# Patient Record
Sex: FEMALE | Race: WHITE | Employment: FULL TIME | URBAN - METROPOLITAN AREA
[De-identification: names, ages, dates, MRNs, and addresses within clinical notes are randomized per-mention and may not be internally consistent; named-entity substitution may affect disease eponyms.]

---

## 2017-03-23 PROBLEM — D17.21 LIPOMA OF RIGHT FOREARM: Status: ACTIVE | Noted: 2017-03-23

## 2018-12-28 ENCOUNTER — APPOINTMENT (OUTPATIENT)
Dept: CT IMAGING | Age: 48
End: 2018-12-28
Payer: COMMERCIAL

## 2018-12-28 ENCOUNTER — HOSPITAL ENCOUNTER (EMERGENCY)
Age: 48
Discharge: HOME | End: 2018-12-28
Payer: COMMERCIAL

## 2018-12-28 VITALS
OXYGEN SATURATION: 96 % | BODY MASS INDEX: 34.96 KG/M2 | TEMPERATURE: 98.2 F | DIASTOLIC BLOOD PRESSURE: 84 MMHG | HEIGHT: 62 IN | WEIGHT: 190 LBS | SYSTOLIC BLOOD PRESSURE: 127 MMHG | RESPIRATION RATE: 16 BRPM | HEART RATE: 98 BPM

## 2018-12-28 DIAGNOSIS — R10.9 RIGHT FLANK PAIN: Primary | ICD-10-CM

## 2018-12-28 DIAGNOSIS — R35.0 URINARY FREQUENCY: ICD-10-CM

## 2018-12-28 LAB
ALBUMIN SERPL-MCNC: 3.8 GM/DL (ref 3.4–5)
ALP BLD-CCNC: 61 IU/L (ref 40–129)
ALT SERPL-CCNC: 11 U/L (ref 10–40)
ANION GAP SERPL CALCULATED.3IONS-SCNC: 13 MMOL/L (ref 4–16)
AST SERPL-CCNC: 13 IU/L (ref 15–37)
BACTERIA: ABNORMAL /HPF
BASOPHILS ABSOLUTE: 0 K/CU MM
BASOPHILS RELATIVE PERCENT: 0.3 % (ref 0–1)
BILIRUB SERPL-MCNC: 0.8 MG/DL (ref 0–1)
BILIRUBIN URINE: NEGATIVE MG/DL
BLOOD, URINE: ABNORMAL
BUN BLDV-MCNC: 10 MG/DL (ref 6–23)
CALCIUM SERPL-MCNC: 9 MG/DL (ref 8.3–10.6)
CHLORIDE BLD-SCNC: 103 MMOL/L (ref 99–110)
CLARITY: CLEAR
CO2: 22 MMOL/L (ref 21–32)
COLOR: COLORLESS
CREAT SERPL-MCNC: 0.7 MG/DL (ref 0.6–1.1)
DIFFERENTIAL TYPE: ABNORMAL
EOSINOPHILS ABSOLUTE: 0.1 K/CU MM
EOSINOPHILS RELATIVE PERCENT: 1.2 % (ref 0–3)
GFR AFRICAN AMERICAN: >60 ML/MIN/1.73M2
GFR NON-AFRICAN AMERICAN: >60 ML/MIN/1.73M2
GLUCOSE BLD-MCNC: 119 MG/DL (ref 70–99)
GLUCOSE, URINE: NEGATIVE MG/DL
HCT VFR BLD CALC: 44.9 % (ref 37–47)
HEMOGLOBIN: 14.2 GM/DL (ref 12.5–16)
IMMATURE NEUTROPHIL %: 0.3 % (ref 0–0.43)
KETONES, URINE: NEGATIVE MG/DL
LEUKOCYTE ESTERASE, URINE: NEGATIVE
LIPASE: 50 IU/L (ref 13–60)
LYMPHOCYTES ABSOLUTE: 1.6 K/CU MM
LYMPHOCYTES RELATIVE PERCENT: 24.5 % (ref 24–44)
MCH RBC QN AUTO: 29.5 PG (ref 27–31)
MCHC RBC AUTO-ENTMCNC: 31.6 % (ref 32–36)
MCV RBC AUTO: 93.2 FL (ref 78–100)
MONOCYTES ABSOLUTE: 0.5 K/CU MM
MONOCYTES RELATIVE PERCENT: 7.6 % (ref 0–4)
NITRITE URINE, QUANTITATIVE: NEGATIVE
NUCLEATED RBC %: 0 %
PDW BLD-RTO: 12.8 % (ref 11.7–14.9)
PH, URINE: 6 (ref 5–8)
PLATELET # BLD: 191 K/CU MM (ref 140–440)
PMV BLD AUTO: 9.6 FL (ref 7.5–11.1)
POTASSIUM SERPL-SCNC: 4.2 MMOL/L (ref 3.5–5.1)
PROTEIN UA: NEGATIVE MG/DL
RBC # BLD: 4.82 M/CU MM (ref 4.2–5.4)
RBC URINE: ABNORMAL /HPF (ref 0–6)
SEGMENTED NEUTROPHILS ABSOLUTE COUNT: 4.3 K/CU MM
SEGMENTED NEUTROPHILS RELATIVE PERCENT: 66.1 % (ref 36–66)
SODIUM BLD-SCNC: 138 MMOL/L (ref 135–145)
SPECIFIC GRAVITY UA: 1 (ref 1–1.03)
SQUAMOUS EPITHELIAL: 1 /HPF
TOTAL IMMATURE NEUTOROPHIL: 0.02 K/CU MM
TOTAL NUCLEATED RBC: 0 K/CU MM
TOTAL PROTEIN: 7 GM/DL (ref 6.4–8.2)
TRICHOMONAS: ABNORMAL /HPF
UROBILINOGEN, URINE: NORMAL MG/DL (ref 0.2–1)
WBC # BLD: 6.6 K/CU MM (ref 4–10.5)
WBC UA: <1 /HPF (ref 0–5)

## 2018-12-28 PROCEDURE — 96375 TX/PRO/DX INJ NEW DRUG ADDON: CPT

## 2018-12-28 PROCEDURE — 85025 COMPLETE CBC W/AUTO DIFF WBC: CPT

## 2018-12-28 PROCEDURE — 96374 THER/PROPH/DIAG INJ IV PUSH: CPT

## 2018-12-28 PROCEDURE — 6360000002 HC RX W HCPCS: Performed by: PHYSICIAN ASSISTANT

## 2018-12-28 PROCEDURE — 2580000003 HC RX 258: Performed by: PHYSICIAN ASSISTANT

## 2018-12-28 PROCEDURE — 81001 URINALYSIS AUTO W/SCOPE: CPT

## 2018-12-28 PROCEDURE — 87086 URINE CULTURE/COLONY COUNT: CPT

## 2018-12-28 PROCEDURE — 99284 EMERGENCY DEPT VISIT MOD MDM: CPT

## 2018-12-28 PROCEDURE — 74176 CT ABD & PELVIS W/O CONTRAST: CPT

## 2018-12-28 PROCEDURE — 83690 ASSAY OF LIPASE: CPT

## 2018-12-28 PROCEDURE — 96361 HYDRATE IV INFUSION ADD-ON: CPT

## 2018-12-28 PROCEDURE — 36415 COLL VENOUS BLD VENIPUNCTURE: CPT

## 2018-12-28 PROCEDURE — 80053 COMPREHEN METABOLIC PANEL: CPT

## 2018-12-28 RX ORDER — MORPHINE SULFATE 4 MG/ML
4 INJECTION, SOLUTION INTRAMUSCULAR; INTRAVENOUS ONCE
Status: COMPLETED | OUTPATIENT
Start: 2018-12-28 | End: 2018-12-28

## 2018-12-28 RX ORDER — ONDANSETRON 2 MG/ML
4 INJECTION INTRAMUSCULAR; INTRAVENOUS ONCE
Status: COMPLETED | OUTPATIENT
Start: 2018-12-28 | End: 2018-12-28

## 2018-12-28 RX ORDER — 0.9 % SODIUM CHLORIDE 0.9 %
1000 INTRAVENOUS SOLUTION INTRAVENOUS ONCE
Status: COMPLETED | OUTPATIENT
Start: 2018-12-28 | End: 2018-12-28

## 2018-12-28 RX ORDER — PHENAZOPYRIDINE HYDROCHLORIDE 200 MG/1
200 TABLET, FILM COATED ORAL 3 TIMES DAILY PRN
Qty: 6 TABLET | Refills: 0 | Status: SHIPPED | OUTPATIENT
Start: 2018-12-28 | End: 2018-12-30

## 2018-12-28 RX ORDER — KETOROLAC TROMETHAMINE 30 MG/ML
30 INJECTION, SOLUTION INTRAMUSCULAR; INTRAVENOUS ONCE
Status: COMPLETED | OUTPATIENT
Start: 2018-12-28 | End: 2018-12-28

## 2018-12-28 RX ORDER — NAPROXEN 500 MG/1
500 TABLET ORAL 2 TIMES DAILY
Qty: 15 TABLET | Refills: 0 | Status: SHIPPED | OUTPATIENT
Start: 2018-12-28 | End: 2020-06-02

## 2018-12-28 RX ORDER — ONDANSETRON 4 MG/1
4 TABLET, ORALLY DISINTEGRATING ORAL EVERY 8 HOURS PRN
Qty: 15 TABLET | Refills: 0 | Status: SHIPPED | OUTPATIENT
Start: 2018-12-28 | End: 2020-06-02

## 2018-12-28 RX ADMIN — ONDANSETRON 4 MG: 2 INJECTION INTRAMUSCULAR; INTRAVENOUS at 14:51

## 2018-12-28 RX ADMIN — KETOROLAC TROMETHAMINE 30 MG: 30 INJECTION, SOLUTION INTRAMUSCULAR at 14:51

## 2018-12-28 RX ADMIN — SODIUM CHLORIDE 1000 ML: 9 INJECTION, SOLUTION INTRAVENOUS at 14:51

## 2018-12-28 RX ADMIN — MORPHINE SULFATE 4 MG: 4 INJECTION INTRAVENOUS at 14:51

## 2018-12-28 ASSESSMENT — PAIN SCALES - GENERAL
PAINLEVEL_OUTOF10: 5
PAINLEVEL_OUTOF10: 5

## 2018-12-28 ASSESSMENT — PAIN DESCRIPTION - PAIN TYPE: TYPE: ACUTE PAIN

## 2018-12-28 ASSESSMENT — PAIN DESCRIPTION - ORIENTATION: ORIENTATION: RIGHT

## 2018-12-28 ASSESSMENT — PAIN DESCRIPTION - LOCATION: LOCATION: FLANK

## 2018-12-30 LAB
CULTURE: NORMAL
Lab: NORMAL
REPORT STATUS: NORMAL
SPECIMEN: NORMAL
TOTAL COLONY COUNT: NORMAL

## 2020-06-02 ENCOUNTER — APPOINTMENT (OUTPATIENT)
Dept: GENERAL RADIOLOGY | Age: 50
End: 2020-06-02
Payer: COMMERCIAL

## 2020-06-02 ENCOUNTER — HOSPITAL ENCOUNTER (EMERGENCY)
Age: 50
Discharge: HOME | End: 2020-06-02
Attending: EMERGENCY MEDICINE
Payer: COMMERCIAL

## 2020-06-02 ENCOUNTER — APPOINTMENT (OUTPATIENT)
Dept: CT IMAGING | Age: 50
End: 2020-06-02
Payer: COMMERCIAL

## 2020-06-02 VITALS
RESPIRATION RATE: 16 BRPM | DIASTOLIC BLOOD PRESSURE: 72 MMHG | SYSTOLIC BLOOD PRESSURE: 153 MMHG | OXYGEN SATURATION: 97 % | TEMPERATURE: 97.6 F | WEIGHT: 240 LBS | HEART RATE: 97 BPM | BODY MASS INDEX: 44.16 KG/M2 | HEIGHT: 62 IN

## 2020-06-02 PROCEDURE — 70450 CT HEAD/BRAIN W/O DYE: CPT

## 2020-06-02 PROCEDURE — 71100 X-RAY EXAM RIBS UNI 2 VIEWS: CPT

## 2020-06-02 PROCEDURE — 6370000000 HC RX 637 (ALT 250 FOR IP): Performed by: EMERGENCY MEDICINE

## 2020-06-02 PROCEDURE — 73120 X-RAY EXAM OF HAND: CPT

## 2020-06-02 PROCEDURE — 99284 EMERGENCY DEPT VISIT MOD MDM: CPT

## 2020-06-02 PROCEDURE — 73100 X-RAY EXAM OF WRIST: CPT

## 2020-06-02 RX ORDER — HYDROCODONE BITARTRATE AND ACETAMINOPHEN 5; 325 MG/1; MG/1
1 TABLET ORAL ONCE
Status: COMPLETED | OUTPATIENT
Start: 2020-06-02 | End: 2020-06-02

## 2020-06-02 RX ORDER — LIDOCAINE 4 G/G
1 PATCH TOPICAL DAILY
Qty: 6 PATCH | Refills: 0 | Status: SHIPPED | OUTPATIENT
Start: 2020-06-02 | End: 2020-06-08

## 2020-06-02 RX ORDER — NAPROXEN 500 MG/1
500 TABLET ORAL 2 TIMES DAILY PRN
Qty: 14 TABLET | Refills: 0 | Status: SHIPPED | OUTPATIENT
Start: 2020-06-02 | End: 2021-01-27

## 2020-06-02 RX ADMIN — HYDROCODONE BITARTRATE AND ACETAMINOPHEN 1 TABLET: 5; 325 TABLET ORAL at 15:22

## 2020-06-02 ASSESSMENT — PAIN DESCRIPTION - ORIENTATION: ORIENTATION: RIGHT

## 2020-06-02 ASSESSMENT — PAIN DESCRIPTION - LOCATION: LOCATION: HAND

## 2020-06-02 ASSESSMENT — PAIN DESCRIPTION - PAIN TYPE: TYPE: ACUTE PAIN

## 2020-06-02 ASSESSMENT — PAIN SCALES - GENERAL
PAINLEVEL_OUTOF10: 7
PAINLEVEL_OUTOF10: 4
PAINLEVEL_OUTOF10: 7

## 2020-06-02 NOTE — ED PROVIDER NOTES
past medical history.     CURRENT MEDICATIONS  [unfilled]    ALLERGIES  Allergies   Allergen Reactions    Levofloxacin Shortness Of Breath       SURGICAL HISTORY  Past Surgical History:   Procedure Laterality Date    APPENDECTOMY      CHOLECYSTECTOMY      HYSTERECTOMY      LITHOTRIPSY         FAMILY HISTORY  Family History   Problem Relation Age of Onset    Asthma Mother     Heart Disease Mother        SOCIAL HISTORY  Social History     Socioeconomic History    Marital status:      Spouse name: None    Number of children: None    Years of education: None    Highest education level: None   Occupational History    None   Social Needs    Financial resource strain: None    Food insecurity     Worry: None     Inability: None    Transportation needs     Medical: None     Non-medical: None   Tobacco Use    Smoking status: Never Smoker    Smokeless tobacco: Never Used   Substance and Sexual Activity    Alcohol use: No    Drug use: No    Sexual activity: Yes   Lifestyle    Physical activity     Days per week: None     Minutes per session: None    Stress: None   Relationships    Social connections     Talks on phone: None     Gets together: None     Attends Tenriism service: None     Active member of club or organization: None     Attends meetings of clubs or organizations: None     Relationship status: None    Intimate partner violence     Fear of current or ex partner: None     Emotionally abused: None     Physically abused: None     Forced sexual activity: None   Other Topics Concern    None   Social History Narrative    None         **Past medical, family and social histories, and nursing notes reviewed and verified by me**      PHYSICAL EXAM  VITAL SIGNS:   ED Triage Vitals [06/02/20 1506]   Enc Vitals Group      BP (!) 153/72      Pulse 97      Resp 16      Temp 97.6 °F (36.4 °C)      Temp Source Oral      SpO2 97 %      Weight 240 lb (108.9 kg)      Height 5' 2\" (1.575 m)      Head hand pain    3. Right wrist pain    4. Fall from ladder, initial encounter    5. Chest wall pain        Disposition referral (if applicable): Shirley Hope DO  [facility] [address] [place] [address] Road  [address] [place] [address],[address] [id number] [phone]    Call   For hand specialist follow-up    Marcus Carver [address]  [phone]          Disposition medications (if applicable):  New Prescriptions    LIDOCAINE 4 % EXTERNAL PATCH    Place 1 patch onto the skin daily for 6 days    NAPROXEN (NAPROSYN) 500 MG TABLET    Take 1 tablet by mouth 2 times daily as needed for Pain       ED Provider Disposition Time  DISPOSITION            Electronically signed by: Teofilo Kaplan M.D., 6/2/2020 4:53 PM      This dictation was created with voice recognition software. While attempts have been made to review the dictation as it is transcribed, on occasion the spoken word can be misinterpreted by the technology leading to omissions or inappropriate words, phrases or sentences.      Charito Gilbert MD  06/02/20 0696

## 2020-11-17 ENCOUNTER — OFFICE VISIT (OUTPATIENT)
Dept: BARIATRICS/WEIGHT MGMT | Age: 50
End: 2020-11-17
Payer: COMMERCIAL

## 2020-11-17 VITALS
BODY MASS INDEX: 47.7 KG/M2 | HEIGHT: 62 IN | WEIGHT: 259.2 LBS | HEART RATE: 89 BPM | TEMPERATURE: 97.3 F | RESPIRATION RATE: 16 BRPM | OXYGEN SATURATION: 99 % | DIASTOLIC BLOOD PRESSURE: 90 MMHG | SYSTOLIC BLOOD PRESSURE: 142 MMHG

## 2020-11-17 PROCEDURE — 99204 OFFICE O/P NEW MOD 45 MIN: CPT | Performed by: SURGERY

## 2020-11-17 PROCEDURE — G8417 CALC BMI ABV UP PARAM F/U: HCPCS | Performed by: SURGERY

## 2020-11-17 PROCEDURE — G8484 FLU IMMUNIZE NO ADMIN: HCPCS | Performed by: SURGERY

## 2020-11-17 PROCEDURE — G8427 DOCREV CUR MEDS BY ELIG CLIN: HCPCS | Performed by: SURGERY

## 2020-11-17 PROCEDURE — 1036F TOBACCO NON-USER: CPT | Performed by: SURGERY

## 2020-11-17 NOTE — PROGRESS NOTES
following cardiovascular risk factor(s): none    The patient has the following obesity-related disorder(s):  Cholelithiasis (s/p open jake), depression and impaired quality of life. Past Medical History:  No past medical history on file. Obese  Hx of nephrolithiasis     Past Surgical History:  Past Surgical History:   Procedure Laterality Date    APPENDECTOMY      CHOLECYSTECTOMY - open      HYSTERECTOMY      LITHOTRIPSY         Family History:  Family History   Problem Relation Age of Onset    Asthma Mother     Heart Disease Mother        Social History:  Social History     Socioeconomic History    Marital status:      Spouse name: Not on file    Number of children: Not on file    Years of education: Not on file    Highest education level: Not on file   Occupational History    Not on file   Social Needs    Financial resource strain: Not on file    Food insecurity     Worry: Not on file     Inability: Not on file    Transportation needs     Medical: Not on file     Non-medical: Not on file   Tobacco Use    Smoking status: Never Smoker    Smokeless tobacco: Never Used   Substance and Sexual Activity    Alcohol use: No    Drug use: No    Sexual activity: Yes   Lifestyle    Physical activity     Days per week: Not on file     Minutes per session: Not on file    Stress: Not on file   Relationships    Social connections     Talks on phone: Not on file     Gets together: Not on file     Attends Mormonism service: Not on file     Active member of club or organization: Not on file     Attends meetings of clubs or organizations: Not on file     Relationship status: Not on file    Intimate partner violence     Fear of current or ex partner: Not on file     Emotionally abused: Not on file     Physically abused: Not on file     Forced sexual activity: Not on file   Other Topics Concern    Not on file   Social History Narrative    Not on file       Allergies:   Allergies   Allergen Reactions  Levofloxacin Shortness Of Breath       Medications:  Current Outpatient Medications   Medication Sig Dispense Refill    naproxen (NAPROSYN) 500 MG tablet Take 1 tablet by mouth 2 times daily as needed for Pain (Patient not taking: Reported on 11/17/2020) 14 tablet 0     No current facility-administered medications for this visit. Review of Systems:  Review of Systems   Constitutional: Positive for fatigue. All other systems reviewed and are negative. Physical Exam:  Physical Exam  Vitals signs reviewed. Constitutional:       General: She is not in acute distress. Appearance: She is obese. She is not ill-appearing, toxic-appearing or diaphoretic. HENT:      Head: Normocephalic and atraumatic. Right Ear: External ear normal.      Left Ear: External ear normal.      Nose: Nose normal.   Eyes:      General:         Right eye: No discharge. Left eye: No discharge. Extraocular Movements: Extraocular movements intact. Neck:      Musculoskeletal: Normal range of motion. Cardiovascular:      Rate and Rhythm: Normal rate. Pulmonary:      Effort: No respiratory distress. Abdominal:      Palpations: Abdomen is soft. Tenderness: There is no abdominal tenderness. Hernia: No hernia is present. Comments: Previous open jake incision well-healed     Musculoskeletal:         General: No swelling. Skin:     General: Skin is warm. Neurological:      General: No focal deficit present. Psychiatric:         Mood and Affect: Mood normal.          Assessment and Plan:  Cherie Ledbetter is a 53 y.o. presenting to clinic for initial surgical evaluation for bariatric surgery. Patient Active Problem List   Diagnosis    Lipoma of right forearm       Plan   1. Because of the known health risks associated with excess body weight, the patient is a good candidate for bariatric surgery.  Reviewed with the patient both sleeve gastrectomy and RYGB, including an overview of each--with pros and cons--while showing them a picture diagram of the new anatomical surgical changes. The patient is interested in sleeve gastrectomy. Discussed with the patient the basics of the surgical procedure including risks, benefits, alternatives, and expected hospital course. Expectations were discussed with the patient and the patient agreed to proceed. The patient previously attended an informational seminar which discussed surgical and non-surgical options as well as procedure specific risks, benefits, and alternatives. 2. Will order initial bariatric surgery labs (routine blood work, chemistries, and nutritional labs). 3. The patient will be scheduled to be seen by our weight management DEIRDRE and dietician after having the above labs drawn and resulted. 4. The patient will be referred for an evaluation by physical therapy for assistance with an exercise plan. 5. We will continue the work-up process toward surgery. 6. The patient will be given a bariatric handbook by the dietician at the initial nutrition class. The patient will be instructed to review the handbook and to ask questions about any part which is not clear at any time throughout the workup process. It was stressed to the patient the need to thoroughly review the handbook and the patient agreed. 7. This patient is a female of reproductive age and was advised she should not become pregnant during the bariatric workup process and for at least 12-18 months after surgery. The pt has had a previous hysterectomy. 8. Discussed with patient that losing weight prior to surgery is statistically significant indicator of post-operative success. Goal set at this visit for patient for weight loss prior to surgery is 15-20 lbs. Thank you for this referral / consult. Please call with any questions or concerns you have. Patient was seen with total face-to-face time of 45 minutes.  More than 50% of this visit was counseling on

## 2020-12-02 ENCOUNTER — HOSPITAL ENCOUNTER (OUTPATIENT)
Age: 50
Discharge: HOME | End: 2020-12-02
Payer: COMMERCIAL

## 2020-12-02 LAB
ALBUMIN SERPL-MCNC: 4.1 GM/DL (ref 3.4–5)
ALP BLD-CCNC: 72 IU/L (ref 40–129)
ALT SERPL-CCNC: 15 U/L (ref 10–40)
ANION GAP SERPL CALCULATED.3IONS-SCNC: 11 MMOL/L (ref 4–16)
AST SERPL-CCNC: 15 IU/L (ref 15–37)
BASOPHILS ABSOLUTE: 0 K/CU MM
BASOPHILS RELATIVE PERCENT: 0.4 % (ref 0–1)
BILIRUB SERPL-MCNC: 0.9 MG/DL (ref 0–1)
BUN BLDV-MCNC: 10 MG/DL (ref 6–23)
CALCIUM SERPL-MCNC: 9.4 MG/DL (ref 8.3–10.6)
CHLORIDE BLD-SCNC: 104 MMOL/L (ref 99–110)
CHOLESTEROL: 213 MG/DL
CO2: 26 MMOL/L (ref 21–32)
CREAT SERPL-MCNC: 0.9 MG/DL (ref 0.6–1.1)
DIFFERENTIAL TYPE: ABNORMAL
EOSINOPHILS ABSOLUTE: 0.1 K/CU MM
EOSINOPHILS RELATIVE PERCENT: 2 % (ref 0–3)
ESTIMATED AVERAGE GLUCOSE: 108 MG/DL
FOLATE: 7.9 NG/ML (ref 3.1–17.5)
GFR AFRICAN AMERICAN: >60 ML/MIN/1.73M2
GFR NON-AFRICAN AMERICAN: >60 ML/MIN/1.73M2
GLUCOSE BLD-MCNC: 92 MG/DL (ref 70–99)
HBA1C MFR BLD: 5.4 % (ref 4.2–6.3)
HCT VFR BLD CALC: 44.8 % (ref 37–47)
HDLC SERPL-MCNC: 83 MG/DL
HEMOGLOBIN: 14.4 GM/DL (ref 12.5–16)
IMMATURE NEUTROPHIL %: 0.2 % (ref 0–0.43)
IRON: 71 UG/DL (ref 37–145)
LDL CHOLESTEROL DIRECT: 128 MG/DL
LYMPHOCYTES ABSOLUTE: 1.2 K/CU MM
LYMPHOCYTES RELATIVE PERCENT: 26.7 % (ref 24–44)
MCH RBC QN AUTO: 29.8 PG (ref 27–31)
MCHC RBC AUTO-ENTMCNC: 32.1 % (ref 32–36)
MCV RBC AUTO: 92.6 FL (ref 78–100)
MONOCYTES ABSOLUTE: 0.3 K/CU MM
MONOCYTES RELATIVE PERCENT: 7.4 % (ref 0–4)
NUCLEATED RBC %: 0 %
PCT TRANSFERRIN: 24 % (ref 10–44)
PDW BLD-RTO: 13.1 % (ref 11.7–14.9)
PLATELET # BLD: 209 K/CU MM (ref 140–440)
PMV BLD AUTO: 10.3 FL (ref 7.5–11.1)
POTASSIUM SERPL-SCNC: 4.7 MMOL/L (ref 3.5–5.1)
RBC # BLD: 4.84 M/CU MM (ref 4.2–5.4)
SEGMENTED NEUTROPHILS ABSOLUTE COUNT: 2.8 K/CU MM
SEGMENTED NEUTROPHILS RELATIVE PERCENT: 63.3 % (ref 36–66)
SODIUM BLD-SCNC: 141 MMOL/L (ref 135–145)
TOTAL IMMATURE NEUTOROPHIL: 0.01 K/CU MM
TOTAL IRON BINDING CAPACITY: 290 UG/DL (ref 250–450)
TOTAL NUCLEATED RBC: 0 K/CU MM
TOTAL PROTEIN: 6.9 GM/DL (ref 6.4–8.2)
TRIGL SERPL-MCNC: 76 MG/DL
UNSATURATED IRON BINDING CAPACITY: 219 UG/DL (ref 110–370)
VITAMIN B-12: 289.1 PG/ML (ref 211–911)
VITAMIN D 25-HYDROXY: 37.41 NG/ML
WBC # BLD: 4.5 K/CU MM (ref 4–10.5)

## 2020-12-02 PROCEDURE — 36415 COLL VENOUS BLD VENIPUNCTURE: CPT

## 2020-12-02 PROCEDURE — 83036 HEMOGLOBIN GLYCOSYLATED A1C: CPT

## 2020-12-02 PROCEDURE — 80053 COMPREHEN METABOLIC PANEL: CPT

## 2020-12-02 PROCEDURE — 83550 IRON BINDING TEST: CPT

## 2020-12-02 PROCEDURE — 82607 VITAMIN B-12: CPT

## 2020-12-02 PROCEDURE — 82746 ASSAY OF FOLIC ACID SERUM: CPT

## 2020-12-02 PROCEDURE — 83721 ASSAY OF BLOOD LIPOPROTEIN: CPT

## 2020-12-02 PROCEDURE — 84630 ASSAY OF ZINC: CPT

## 2020-12-02 PROCEDURE — 80061 LIPID PANEL: CPT

## 2020-12-02 PROCEDURE — 82306 VITAMIN D 25 HYDROXY: CPT

## 2020-12-02 PROCEDURE — 85025 COMPLETE CBC W/AUTO DIFF WBC: CPT

## 2020-12-02 PROCEDURE — 83540 ASSAY OF IRON: CPT

## 2020-12-03 ENCOUNTER — TELEPHONE (OUTPATIENT)
Dept: BARIATRICS/WEIGHT MGMT | Age: 50
End: 2020-12-03

## 2020-12-03 NOTE — TELEPHONE ENCOUNTER
Outpatient Nutrition Counseling    REASON FOR VISIT: Initial Nutrition Education    Chief Complaint:  No chief complaint on file. SUBJECTIVE:  Pt was called to instruct on initial nutrition education. All handouts were emailed to pt including pt handbook and voiced receipt. Pt was instructed on mindful eating, label reading, calorie counting, healthy food choice and goal setting. Pt verbalized understanding of all material provided. The patient is a 53 y.o. female being seen for morbid obesity, considering weight loss surgery; Kathya's,  ,  , Current There is no height or weight on file to calculate BMI. The patient's PCP is Lenny Ruiz MD     Comorbid Conditions:  Significant diseases affecting this patient are No past medical history on file. .    Review of Systems - @Polar@  Otherwise per HPI. Allergies:   Allergies   Allergen Reactions    Levofloxacin Shortness Of Breath       Past Surgical History:  Past Surgical History:   Procedure Laterality Date    APPENDECTOMY      CHOLECYSTECTOMY      HYSTERECTOMY      LITHOTRIPSY         Family History:  Family History   Problem Relation Age of Onset    Asthma Mother     Heart Disease Mother        Social History:  Social History     Socioeconomic History    Marital status:      Spouse name: Not on file    Number of children: Not on file    Years of education: Not on file    Highest education level: Not on file   Occupational History    Not on file   Social Needs    Financial resource strain: Not on file    Food insecurity     Worry: Not on file     Inability: Not on file    Transportation needs     Medical: Not on file     Non-medical: Not on file   Tobacco Use    Smoking status: Never Smoker    Smokeless tobacco: Never Used   Substance and Sexual Activity    Alcohol use: No    Drug use: No    Sexual activity: Yes   Lifestyle    Physical activity     Days per week: Not on file     Minutes per session: Not on file    Stress: Not on file   Relationships    Social connections     Talks on phone: Not on file     Gets together: Not on file     Attends Gnosticist service: Not on file     Active member of club or organization: Not on file     Attends meetings of clubs or organizations: Not on file     Relationship status: Not on file    Intimate partner violence     Fear of current or ex partner: Not on file     Emotionally abused: Not on file     Physically abused: Not on file     Forced sexual activity: Not on file   Other Topics Concern    Not on file   Social History Narrative    Not on file         OBJECTIVE:  Physical Exam   @VS@       NUTRITION DIAGNOSIS: Overweight / Obesity   Problem: Increased adiposity compared to reference standard or established norms   Etiology: Excess intake compared to output over time   S/S: Ht: 62\" Wt: 259.2 lbs BMI: 47.41      NUTRITION INTERVENTIONS:    Individualized treatment goals to address nutritiondiagnosis:   Instructed on 1200 kcal diet for weight loss   Provided pt handbook, food lists, and goal card   Encouraged Physical activity as approved by physician    MONITORING/ EVALUATION/ PLAN:   Pt verbalized understanding of allmaterials covered   Pt asked pertinent questions throughout the session - expect compliance with nutrition guidelines presented   Provided pt with contact information should questions arise prior to next visit   Will f/u with pt in 2-3 months for further education   Griselda Tran MS, RDN, LD  12/3/2020

## 2020-12-04 LAB — ZINC: 86.8 UG/DL (ref 60–120)

## 2020-12-14 ENCOUNTER — HOSPITAL ENCOUNTER (OUTPATIENT)
Dept: PHYSICAL THERAPY | Age: 50
Setting detail: THERAPIES SERIES
Discharge: HOME | End: 2020-12-14
Payer: COMMERCIAL

## 2020-12-14 PROCEDURE — 97161 PT EVAL LOW COMPLEX 20 MIN: CPT

## 2020-12-14 PROCEDURE — 97110 THERAPEUTIC EXERCISES: CPT

## 2020-12-14 NOTE — PROGRESS NOTES
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 30mins.   Additional Comments: 6 min walk 1604ft    Objective          AROM RLE (degrees)  RLE AROM: WFL  AROM LLE (degrees)  LLE AROM : WFL  AROM RUE (degrees)  RUE AROM : WFL  AROM LUE (degrees)  LUE AROM : WFL    Strength RLE  Strength RLE: WFL  Strength LLE  Strength LLE: WFL  Strength RUE  Strength RUE: WFL  Strength LUE  Strength LUE: WFL     Additional Measures  Special Tests: 30sec sit/stand 17 reps                   Karla Fuller, PT

## 2020-12-14 NOTE — PLAN OF CARE
Outpatient Physical Therapy                  [x] Phone: [phone]   Fax: [phone]    Pediatric Therapy                                    [] Phone: [phone]   Fax: [phone]  Pediatric [place]                                      [] Phone: [phone]   Fax: [phone]      To:  Dr. Titi Phillips    From: Karla Fuller, PT     Patient: Troy Monreal       : 1970          Date: 2020    Physical Therapy Certification/Re-Certification Form  Dear Dr. Titi Phillips  The following patient has been evaluated for physical therapy services and for therapy to continue, Please review the attached evaluation and/or summary of the patient's plan of care, and verify that you agree therapy should continue by signing the attached document and sending it back to our office. Patient is a  52 yo female who presents for bariatric preop consult. Patient's goal is to lose weight and have surgery ;patient reports that obesity limits activities including walking, playing with grandkids, lifting, beding; PT to address patient's goals, impairments and activity limitations with skilled interventions checked in plan of care;patient's level of function prior to onset of obesity was independent and unencumbered; did not observe any barriers to learning during PT eval;no DME is currently being used;      Current functional level (based on 6 mins walk)   :  1604ft       Sit/stand 30sec= 17 reps    This patient was instructed/educated on the benefits of cardiovascular and resistive exercises. Including burning Calories, controlling blood pressure, lowering resting heart rate, improved blood glucose control/tolerance, improved sense of well being, improved sleep. Specific to cardiovascular activity: effective Calorie burning with sustained elevated heart rate. Education provided on effective heart rate zone 50-80% of heart rate maximum, and sustaining exercise in that range.   Also the patient was instructed to use respiratory effort as a general guide for exercise intensity. Discussed with the patient, resistive/strength training activities for improved strength, control, muscle hypertrophy and increased resting tone leading to increased resting metabolic rate and energy use. Goals and guidelines for exercise discussed with the patient with goals of 1 hr of exercise 5x/wk. Incorporate strength/resistance exercises 3x/wk. Start with 3-4 days a week for 20mins and progress 5 minutes a week. Plan of Care/Treatment to date:  [x] Therapeutic Exercise    [] Aquatics:  [] Therapeutic Activity    [] Ultrasound  [] Elec Stimulation  [] Gait Training     [] Cervical Traction [] Lumbar Traction  [] Neuromuscular Re-education [] Cold/hotpack [] Iontophoresis   [x] Instruction in HEP       [] Manual Therapy     [] vasopneumatic            [] Self care home management        []Dry needling trigger point point/pain management          Frequency/Duration: group session x 1  # Days per week: [] 1 day # Weeks: [] 1 week [] 5 weeks     [] 2 days   [] 2 weeks [] 6 weeks     [] 3 days   [] 3 weeks [] 7 weeks     [] 4 days   [] 4 weeks [] 8 weeks     Electronically signed by:  Leonides Kenyon PT, 12/14/2020, 12:38 PM              If you have any questions or concerns, please don't hesitate to call.   Thank you for your referral.      Physician Signature:_________________Date:____________Time: ________  By signing above, therapists plan is approved by physician

## 2020-12-16 ENCOUNTER — OFFICE VISIT (OUTPATIENT)
Dept: BARIATRICS/WEIGHT MGMT | Age: 50
End: 2020-12-16
Payer: COMMERCIAL

## 2020-12-16 ENCOUNTER — HOSPITAL ENCOUNTER (OUTPATIENT)
Dept: PHYSICAL THERAPY | Age: 50
Setting detail: THERAPIES SERIES
Discharge: HOME | End: 2020-12-16
Payer: COMMERCIAL

## 2020-12-16 VITALS
OXYGEN SATURATION: 97 % | WEIGHT: 260.1 LBS | BODY MASS INDEX: 47.57 KG/M2 | SYSTOLIC BLOOD PRESSURE: 128 MMHG | TEMPERATURE: 97.5 F | DIASTOLIC BLOOD PRESSURE: 78 MMHG | HEART RATE: 95 BPM

## 2020-12-16 PROCEDURE — 97150 GROUP THERAPEUTIC PROCEDURES: CPT

## 2020-12-16 PROCEDURE — 99214 OFFICE O/P EST MOD 30 MIN: CPT | Performed by: NURSE PRACTITIONER

## 2020-12-16 PROCEDURE — G8427 DOCREV CUR MEDS BY ELIG CLIN: HCPCS | Performed by: NURSE PRACTITIONER

## 2020-12-16 ASSESSMENT — ENCOUNTER SYMPTOMS
WHEEZING: 0
ABDOMINAL DISTENTION: 0
DIARRHEA: 0
CHEST TIGHTNESS: 0
BACK PAIN: 1
RHINORRHEA: 0
ABDOMINAL PAIN: 0
NAUSEA: 0
SHORTNESS OF BREATH: 0
TROUBLE SWALLOWING: 0
EYE PAIN: 0

## 2020-12-16 NOTE — DISCHARGE SUMMARY
Outpatient Physical Therapy                                                                    [place]           [x] Phone: [phone]   Fax: [phone]  [place]           [] Phone: [phone]   Fax: [phone]          Physical Therapy Daily Treatment Note  Date:  2020    Patient Name:  Constantino Brody    :  1970  MRN: [id number]  Restrictions/Precautions:    Pain level: 0/10     Constantino Brody was seen today for the physical activity education presentation. Patient was screened for Covid symptoms and their temperature was taken. Patient does not have symptoms warranting delaying services. Will proceed with today's session. The purpose of the group exercises presentation was to present information to the individual for fitness and wellness. Part one of the presentation we spoke about the reasons why exercises and wellness is beneficial. We presented data and examples of different symptoms in the body improve as well improvement with sleep, anxiety, depression weight loss and improve in lung function. Part 2- Setting goals was described. SMART goals and formation of short term and long term goals to help be successful. Patient was referred back to their packet for example worksheet. Part 3- Examples of how to get started. Planning your exercises routine to be successful. Remember to be start slow and gradually increase in the intensity at home. Drinking plenty of fluid and always perform a warm up or cool down. Part 4- Exercises Basics - demonstrated the importance of a 5-10 min warm up with light stretches. Cool down to allow the body to return to base line measure. They demonstrated how to take their heart rate and why it is important to know the target range zone for fitness. Explained the Do's and Don'ts and the PRICE principal if injured. Explained they are to seek medical advice if severe pain, numbness, swelling or radicular symptoms occur. Patient did not have any adverse reactions after the group therapy and reported they felt fine no issues. Patient left with a exercises information packet and explained to call if there are any additional questions. Patient will be discharged from therapy services after today.      Billed 1 group charge     Time: 2719-1855    Gwendolyn Giordano PT, DPT, [facility]      12/16/2020 8:36 AM

## 2020-12-16 NOTE — PROGRESS NOTES
BARIATRIC SURGERYOFFICE NOTE    SUBJECTIVE:    Patient presenting today referred from Dana Meade MD, for No chief complaint on file. .    Vitals:    12/16/20 1048   BP: 128/78   Pulse: 95   Temp: 97.5 °F (36.4 °C)   SpO2: 97%        BMI: Body mass index is 47.57 kg/m². Obesity Classification: III Morbid Obesity. Weight History: Wt Readings from Last 3 Encounters:   12/16/20 260 lb 1.6 oz (118 kg)   11/17/20 259 lb 3.2 oz (117.6 kg)   06/02/20 240 lb (108.9 kg)       HPI: Mat Kebede is a 53 y.o. female presenting in second bariatric visit, follow up diet and exercise - pre-operative weight loss, in consideration for bariatric surgery. Diet recall:Eggs and bread with turkey sausage. Lunch light, with chicken fajitas. 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 Met with RD. PT completed. Labs reviewed WNL. States has some IBS. Water intake is good. Sprite, down to 2 per day. Total weight loss/gain +0.9 Lbs over 2 month. Thoroughly reviewed thepatient's medical history, family history, social history and review of systems with the patient today in the office. Please see medical record for pertinent positives. History reviewed. No pertinent past medical history. Patient Active Problem List   Diagnosis    Lipoma of right forearm     Past Surgical History:   Procedure Laterality Date    APPENDECTOMY      CHOLECYSTECTOMY      HYSTERECTOMY      LITHOTRIPSY          Current Outpatient Medications   Medication Sig Dispense Refill    naproxen (NAPROSYN) 500 MG tablet Take 1 tablet by mouth 2 times daily as needed for Pain (Patient not taking: Reported on 11/17/2020) 14 tablet 0     No current facility-administered medications for this visit. Allergies   Allergen Reactions    Levofloxacin Shortness Of Breath       Review of Systems   Constitutional: Negative. Negative for appetite change, fatigue and fever. HENT: Negative for congestion, dental problem, hearing loss, rhinorrhea and trouble swallowing. Eyes: Negative for pain. Respiratory: Negative for chest tightness, shortness of breath and wheezing. Cardiovascular: Negative for chest pain, palpitations and leg swelling. Gastrointestinal: Negative for abdominal distention, abdominal pain, diarrhea and nausea. Endocrine: Negative for cold intolerance and polydipsia. Genitourinary: Negative for difficulty urinating and frequency. Musculoskeletal: Positive for arthralgias and back pain. Negative for gait problem. Skin: Negative for rash. Allergic/Immunologic: Negative for environmental allergies. Neurological: Negative for dizziness, seizures and syncope. Hematological: Does not bruise/bleed easily. Psychiatric/Behavioral: Negative for behavioral problems and suicidal ideas. OBJECTIVE:    /78   Pulse 95   Temp 97.5 °F (36.4 °C)   Wt 260 lb 1.6 oz (118 kg)   SpO2 97%   BMI 47.57 kg/m²       Physical Exam  Vitals signs and nursing note reviewed. Constitutional:       Appearance: She is well-developed. Comments: Obese   HENT:      Head: Normocephalic and atraumatic. Right Ear: Hearing and ear canal normal.      Left Ear: Hearing and ear canal normal.      Nose: Nose normal.      Mouth/Throat:      Pharynx: Uvula midline. Eyes:      Conjunctiva/sclera: Conjunctivae normal.      Pupils: Pupils are equal, round, and reactive to light. Neck:      Musculoskeletal: Normal range of motion. Cardiovascular:      Rate and Rhythm: Normal rate and regular rhythm. Heart sounds: Normal heart sounds. Pulmonary:      Effort: Pulmonary effort is normal.      Breath sounds: Normal breath sounds. No decreased breath sounds, wheezing, rhonchi or rales. Abdominal:      General: Bowel sounds are normal.      Palpations: Abdomen is soft. Tenderness: There is no abdominal tenderness. Musculoskeletal: Normal range of motion. General: No tenderness. Comments: In all 4 extremities. Skin:     General: Skin is warm and dry. Findings: No rash. Neurological:      Mental Status: She is alert and oriented to person, place, and time. GCS: GCS eye subscore is 4. GCS verbal subscore is 5. GCS motor subscore is 6. Motor: No abnormal muscle tone. Psychiatric:         Behavior: Behavior normal.         Judgment: Judgment normal.         ASSESSMENT & PLAN:    1. Morbid obesity with BMI of 45.0-49.9, adult ([facility] [address]  - first visit with NP, risk stratification.   - Discussed below. - Must cut out pop.   - Ambulatory referral to Pulmonology  - Ambulatory referral to Cardiology  - RTC 1 month with NP, in person. 2. Pre-op evaluation  - Ambulatory referral to Pulmonology  - Ambulatory referral to Cardiology       Patientwas encouraged to journal all food intake. Keep calorie level at approximately 6784-8019. Protein intake is to be a minimum of 40-50 grams per day. Water drinking was encouraged with a goal of 64oz-128oz daily. Beverages sharlene calorie free except for milk and avoid soda. Continue to increase level of physical activity. I spent 25 minutes with the patient face to face today and over 50% of the office visit today was spent in face to face counseling regarding diet and exercise, in preparation for her planned Robotic Sleeve Gastrectomy. Discussed in length complying with the dietary recommendations, complying with the preoperative workup including dietary counseling completed, exercise physiologist counseling completed, andpre-operative optimization of pulmonologist referral placed and cardiologist referral placed. The patient expressed understanding and willingness to comply nicely; all questions and concerns addressed. No orders of the defined types were placed in this encounter.     Orders Placed This Encounter   Procedures  Ambulatory referral to Pulmonology     Referral Priority:   Routine     Referral Type:   Consult for Advice and Opinion     Referral Reason:   Specialty Services Required     Referred to Provider:   Karley Corea MD     Number of Visits Requested:   1    Ambulatory referral to Cardiology     Referral Priority:   Routine     Referral Type:   Consult for Advice and Opinion     Referred to Provider:   Cassandra Rosas MD     Number of Visits Requested:   1       Follow Up:  Return in about 1 month (around 1/16/2021) for Weight Check.     Conner Cummings, CNP

## 2021-01-27 ENCOUNTER — OFFICE VISIT (OUTPATIENT)
Dept: BARIATRICS/WEIGHT MGMT | Age: 51
End: 2021-01-27
Payer: COMMERCIAL

## 2021-01-27 VITALS
SYSTOLIC BLOOD PRESSURE: 128 MMHG | HEART RATE: 62 BPM | BODY MASS INDEX: 46.39 KG/M2 | HEIGHT: 62 IN | RESPIRATION RATE: 18 BRPM | WEIGHT: 252.1 LBS | DIASTOLIC BLOOD PRESSURE: 74 MMHG | TEMPERATURE: 97.1 F

## 2021-01-27 DIAGNOSIS — Z01.818 PRE-OP EVALUATION: ICD-10-CM

## 2021-01-27 DIAGNOSIS — E66.01 MORBID OBESITY WITH BMI OF 45.0-49.9, ADULT (HCC): Primary | ICD-10-CM

## 2021-01-27 PROCEDURE — G8417 CALC BMI ABV UP PARAM F/U: HCPCS | Performed by: NURSE PRACTITIONER

## 2021-01-27 PROCEDURE — 1036F TOBACCO NON-USER: CPT | Performed by: NURSE PRACTITIONER

## 2021-01-27 PROCEDURE — G8484 FLU IMMUNIZE NO ADMIN: HCPCS | Performed by: NURSE PRACTITIONER

## 2021-01-27 PROCEDURE — G8427 DOCREV CUR MEDS BY ELIG CLIN: HCPCS | Performed by: NURSE PRACTITIONER

## 2021-01-27 PROCEDURE — 99213 OFFICE O/P EST LOW 20 MIN: CPT | Performed by: NURSE PRACTITIONER

## 2021-01-27 ASSESSMENT — ENCOUNTER SYMPTOMS
ABDOMINAL DISTENTION: 0
DIARRHEA: 0
WHEEZING: 0
EYE PAIN: 0
TROUBLE SWALLOWING: 0
SHORTNESS OF BREATH: 0
ABDOMINAL PAIN: 0
BACK PAIN: 1
CHEST TIGHTNESS: 0
RHINORRHEA: 0
NAUSEA: 0

## 2021-01-27 NOTE — PROGRESS NOTES
BARIATRIC SURGERYOFFICE NOTE    SUBJECTIVE:    Patient presenting today referred from Angelique Guaman MD, for   Chief Complaint   Patient presents with    Weight Management     3rd WM visit, diet, exercise, and pre-surgical weight loss. .    Vitals:    01/27/21 1019   BP: 128/74   Pulse: 62   Resp: 18   Temp: 97.1 °F (36.2 °C)        BMI: Body mass index is 46.11 kg/m². Obesity Classification: III Morbid Obesity. Weight History: Wt Readings from Last 3 Encounters:   01/27/21 252 lb 1.6 oz (114.4 kg)   12/16/20 260 lb 1.6 oz (118 kg)   11/17/20 259 lb 3.2 oz (117.6 kg)       HPI: Neena Baumgarten is a 53 y.o. female presenting in third bariatric visit, follow up diet and exercise - pre-operative weight loss, in consideration for bariatric surgery. Patient dines out to a sit down restaurant 1 times per month. Patient eats fast food meals 0 times per month. Drinks mostly water    24 hour recall/food frequency chart:  Breakfast: banana and oatmeal  Snack: none  Lunch: protein shake  Snack: none  Dinner: egg wrap with chicken/peppers/onions  Snack: none    Total daily calories: <1200      Exercise: wii fit 30 min per day  Did very well this month. Protein at each meal.   Cardiac and pulmonary in progress. Off pop completely. Total weight loss/gain -7.1 Lbs over 3 month. Thoroughly reviewed thepatient's medical history, family history, social history and review of systems with the patient today in the office. Please see medical record for pertinent positives. History reviewed. No pertinent past medical history.    Patient Active Problem List   Diagnosis    Lipoma of right forearm     Past Surgical History:   Procedure Laterality Date    APPENDECTOMY      CHOLECYSTECTOMY      HYSTERECTOMY      LITHOTRIPSY          Current Outpatient Medications   Medication Sig Dispense Refill    Multiple Vitamins-Minerals (MULTIVITAMIN WOMEN PO) Take by mouth No current facility-administered medications for this visit. Allergies   Allergen Reactions    Levofloxacin Shortness Of Breath       Review of Systems   Constitutional: Negative. Negative for appetite change, fatigue and fever. HENT: Negative for congestion, dental problem, hearing loss, rhinorrhea and trouble swallowing. Eyes: Negative for pain. Respiratory: Negative for chest tightness, shortness of breath and wheezing. Cardiovascular: Negative for chest pain, palpitations and leg swelling. Gastrointestinal: Negative for abdominal distention, abdominal pain, diarrhea and nausea. Endocrine: Negative for cold intolerance and polydipsia. Genitourinary: Negative for difficulty urinating and frequency. Musculoskeletal: Positive for arthralgias and back pain. Negative for gait problem. Skin: Negative for rash. Allergic/Immunologic: Negative for environmental allergies. Neurological: Negative for dizziness, seizures and syncope. Hematological: Does not bruise/bleed easily. Psychiatric/Behavioral: Negative for behavioral problems and suicidal ideas. OBJECTIVE:    /74   Pulse 62   Temp 97.1 °F (36.2 °C) (Oral)   Resp 18   Ht 5' 2\" (1.575 m)   Wt 252 lb 1.6 oz (114.4 kg)   BMI 46.11 kg/m²       Physical Exam  Vitals signs and nursing note reviewed. Constitutional:       Appearance: She is well-developed. Comments: Obese   HENT:      Head: Normocephalic and atraumatic. Right Ear: Hearing and ear canal normal.      Left Ear: Hearing and ear canal normal.      Nose: Nose normal.      Mouth/Throat:      Pharynx: Uvula midline. Eyes:      Conjunctiva/sclera: Conjunctivae normal.      Pupils: Pupils are equal, round, and reactive to light. Neck:      Musculoskeletal: Normal range of motion. Cardiovascular:      Rate and Rhythm: Normal rate and regular rhythm. Heart sounds: Normal heart sounds.    Pulmonary:      Effort: Pulmonary effort is normal. Breath sounds: Normal breath sounds. No decreased breath sounds, wheezing, rhonchi or rales. Abdominal:      General: Bowel sounds are normal.      Palpations: Abdomen is soft. Tenderness: There is no abdominal tenderness. Musculoskeletal: Normal range of motion. General: No tenderness. Comments: In all 4 extremities. Skin:     General: Skin is warm and dry. Findings: No rash. Neurological:      Mental Status: She is alert and oriented to person, place, and time. GCS: GCS eye subscore is 4. GCS verbal subscore is 5. GCS motor subscore is 6. Motor: No abnormal muscle tone. Psychiatric:         Behavior: Behavior normal.         Judgment: Judgment normal.         ASSESSMENT & PLAN:    1. Morbid obesity with BMI of 45.0-49.9, adult ([facility] [address]  - patient is down over -7 lbs from last month. - Doing very well. - diet recall within 1200 calories. - Discussed starting b12 daily.   - Psych placed. - Amb External Referral To Psychology  - RTC  1 month with Surgeon, EGD/Consent. 2. Pre-op evaluation  - Continue working on diet and weight loss  - Amb External Referral To Psychology       Discussed in length complying with the dietary recommendations, complying with the preoperative workup including dietary counseling completed, exercise physiologist counseling completed, andpre-operative optimization of pulmonologist in process and cardiologist in process. The patient expressed understanding and willingness to comply nicely; all questions and concerns addressed. No orders of the defined types were placed in this encounter.     Orders Placed This Encounter   Procedures    Amb External Referral To Psychology     Referral Priority:   Routine     Referral Reason:   Specialty Services Required     Referred to Provider:   Rossana Ayala     Requested Specialty:   Psychology     Number of Visits Requested:   1       Follow Up: Return in about 1 month (around 2/27/2021) for Weight Check.     Gloria Tan, CNP

## 2021-02-02 ENCOUNTER — INITIAL CONSULT (OUTPATIENT)
Dept: CARDIOLOGY CLINIC | Age: 51
End: 2021-02-02
Payer: COMMERCIAL

## 2021-02-02 VITALS
SYSTOLIC BLOOD PRESSURE: 108 MMHG | HEIGHT: 61 IN | WEIGHT: 254.4 LBS | DIASTOLIC BLOOD PRESSURE: 76 MMHG | HEART RATE: 97 BPM | BODY MASS INDEX: 48.03 KG/M2

## 2021-02-02 DIAGNOSIS — R06.09 DYSPNEA ON EXERTION: ICD-10-CM

## 2021-02-02 DIAGNOSIS — E66.01 CLASS 3 SEVERE OBESITY DUE TO EXCESS CALORIES WITH BODY MASS INDEX (BMI) OF 45.0 TO 49.9 IN ADULT, UNSPECIFIED WHETHER SERIOUS COMORBIDITY PRESENT (HCC): ICD-10-CM

## 2021-02-02 DIAGNOSIS — Z01.818 PRE-OP EVALUATION: Primary | ICD-10-CM

## 2021-02-02 DIAGNOSIS — Z82.49 FAMILY HISTORY OF EARLY CAD: ICD-10-CM

## 2021-02-02 PROCEDURE — G8417 CALC BMI ABV UP PARAM F/U: HCPCS | Performed by: INTERNAL MEDICINE

## 2021-02-02 PROCEDURE — G8428 CUR MEDS NOT DOCUMENT: HCPCS | Performed by: INTERNAL MEDICINE

## 2021-02-02 PROCEDURE — 99203 OFFICE O/P NEW LOW 30 MIN: CPT | Performed by: INTERNAL MEDICINE

## 2021-02-02 PROCEDURE — G8484 FLU IMMUNIZE NO ADMIN: HCPCS | Performed by: INTERNAL MEDICINE

## 2021-02-02 PROCEDURE — 1036F TOBACCO NON-USER: CPT | Performed by: INTERNAL MEDICINE

## 2021-02-02 PROCEDURE — 93000 ELECTROCARDIOGRAM COMPLETE: CPT | Performed by: INTERNAL MEDICINE

## 2021-02-02 NOTE — PROGRESS NOTES
Hailey Farmer MD                                  CARDIOLOGY  NOTE        Referring Physician: Milly Renee*    Thank you for consultation. Chief Complaint:    Chief Complaint   Patient presents with    New Patient    Cardiac Clearance        HPI:     Lucy Christensen is a 53y.o. year old female with past medical history significant for morbid obesity BMI of 48.07, presents for preop assessment. Patient is anticipating gastric weight loss surgery. As part of work-up patient presents for preop cardiac risk assessment    Patient denies any chest pain  + shortness of breath with exertion. No PND/Orthopnea  No palpitations      Pt mother  at the age of 46 from MI     No prior hx of CAD, CHF, Arrhythmias     EKG  normal sinus rhythm without ST-T changes        Current Outpatient Medications   Medication Sig Dispense Refill    Multiple Vitamins-Minerals (MULTIVITAMIN WOMEN PO) Take by mouth       No current facility-administered medications for this visit. Allergies:     Levofloxacin    Patient History:    History reviewed. No pertinent past medical history. Past Surgical History:   Procedure Laterality Date    APPENDECTOMY      CHOLECYSTECTOMY      HYSTERECTOMY      LITHOTRIPSY       Family History   Problem Relation Age of Onset    Asthma Mother     Heart Disease Mother      Social History     Tobacco Use    Smoking status: Never Smoker    Smokeless tobacco: Never Used   Substance Use Topics    Alcohol use: No        Review of Systems:     · Constitutional:  No Fever or Weight Loss   · Eyes: No Decreased Vision  · ENT: No Headaches, Hearing Loss or Vertigo  · Cardiovascular: No Chest Pain,  Mild Shortness of breath, No Palpitations. No Edema   · Respiratory: No cough or wheezing .  No Respiratory distress   · Gastrointestinal: No abdominal pain, appetite loss, blood in stools, constipation, diarrhea or heartburn  · Genitourinary: No dysuria, trouble voiding, or hematuria Component Value Date    CHOL 213 (H) 12/02/2020    TRIG 76 12/02/2020    HDL 83 12/02/2020    LDLDIRECT 128 (H) 12/02/2020     Lab Results   Component Value Date    WBC 4.5 12/02/2020    HGB 14.4 12/02/2020    HCT 44.8 12/02/2020    MCV 92.6 12/02/2020     12/02/2020     TSH: No results found for: TSH  Lab Results   Component Value Date    AST 15 12/02/2020    ALT 15 12/02/2020    BILITOT 0.9 12/02/2020    ALKPHOS 72 12/02/2020         All labs, medications and tests reviewed by myself including data and history from outside source , patient and available family . 1. Pre-op evaluation    2. Dyspnea on exertion    3. Class 3 severe obesity due to excess calories with body mass index (BMI) of 45.0 to 49.9 in adult, unspecified whether serious comorbidity present ([address]    4. Family history of early CAD         Impression and Plan:      1. Morbid Obesity: BMI 48.07 Low salt diet and exercise as tolerated advised  2. Dyspnea on exertion  3. Premature CAD in family   4. Pre op Risk Assessment     Obtain Exercise stress test for risk stratification  Echocardiogram to rule out structural heart disease       No follow-ups on file. Counseled extensively and medication compliance urged. We discussed that for the  prevention of ASCVD our  goal is aggressive risk modification. Patient is encouraged to exercise even a brisk walk for 30 minutes  at least 3 to 4 times a week   Various goals were discussed and questions answered. Continue current medications. Appropriate prescriptions are addressed and refills ordered. Questions answered and patient verbalizes understanding. Call for any problems, questions, or concerns.

## 2021-02-02 NOTE — LETTER
[facility]    Dr. Sukhdev Mendiola  1970  [id number]    Have you had any Chest Pain that is not new? -   If Yes DO EKG - How does it feel -    How long does the pain last -      How long have you been having the pain -    Did you take a    And did it relieve the pain -      DO EKG IF: Patient has a Heart Rate above 100 or below 40     CAD (Coronary Artery Disease) patient should have one on file every 6 months        Have you had any Shortness of Breath - No  If Yes - When     Have you had any dizziness - No  If Yes DO ORTHOSTATIC BP - when do you feel dizzy    How long does it last .        Sitting wait 5 minutes do supine (laying down) wait 5 minutes then do standing - log each in \"vitals\" area in Epic   Be sure to ask what symptoms they are having if they get dizzy while completing ortho stats such as room spinning, nausea, etc.    Have you had any palpitations that are not new? - No  If Yes DO EKG - Do you feel your heart   How long does it last -     Is the patient on any of the following medications -   If Yes DO EKG - Needs done every 6 months    Do you have any edema - swelling in     If Yes - CHECK TO SEE IF THE EDEMA IS PITTING  How long have they been having edema -   If Yes - Have they worn compression stockings     Vein \"LEG PROBLEM Questionnaire\"  1. Do you have prominent leg veins? No   2. Do you have any skin discoloration? No  3. Do you have any healed or active sores? No  4. Do you have swelling of the legs? No  5. Do you have a family history of varicose veins? No  6. Does your profession involve pro-longed        standing or heavy lifting? No  7. Have you been fighting overweight problems? Yes  8. Do you have restless legs? No  9. Do you have any night time cramps? No  10.  Do you have any of the following in your legs:             Do you have a surgery or procedure scheduled in the near future - Yes  If Yes- DO EKG  If Yes - Who is the surgery

## 2021-02-02 NOTE — PATIENT INSTRUCTIONS
Please be informed that if you contact our office outside of normal business hours the physician on call cannot help with any scheduling or rescheduling issues, procedure instruction questions or any type of medication issue. We advise you for any urgent/emergency that you go to the nearest emergency room! PLEASE CALL OUR OFFICE DURING NORMAL BUSINESS HOURS    Monday - Friday   8 am to 5 pm    [address]: [phone]    [place]:  [phone]      Please hold on to these instructions the  will call you within 1-9 business days when we receive authorization from your insurance. Echocardiogram    WHAT TO EXPECT:   ? This test will take approximately 45 minutes. ? It is an ultrasound of the heart. ? It can look at the valves and chambers inside the heart   ? There is no special instructions for this test.     If you are unable to keep this appointment, please notify us 24 hours prior to test at [phone]. Please hold on to these instructions the  will call you within 1-9 business days when we receive authorization from your insurance. Treadmill Stress test    WHAT TO EXPECT:     The exercise stress test is a test used to provide information about how the heart responds to exertion. It involves walking on a treadmill at increasing levels of difficulty, while electrocardiogram, heart rate, and blood pressure are monitored. ? This test will take approximately 1 hours: Please arrive at the office 5-10 min before the scheduled testing time. ? Once you are taken back to the stress lab you will be asked to read and sign a consent before proceeding with the test. At this time feel free to ask any question that you may have as the procedure is explained to you. ? You will be attached to the EKG monitoring equipment, your blood pressure will be taken, and you will begin walking on the treadmill. The treadmill starts off slowly and every 3 minutes the treadmill speeds up and the elevation increases. The average person usually walks for a period of 6-8min. PREPARATION FOR TEST:    ? Eat a light meal such as juice and toast at least 2 hours prior to the procedure. ? AVOID CAFFEINE 24 HOURS PRIOR TO THE TEST: Including coffee, Tea, Lora and other soft drinks even those labeled  caffeine free or decaffeinated. ? Please wear loose comfortable clothing and comfortable walking shoes. Please wear a short sleeved shirt. ? Please shower or bath and do not apply powder or lotion to the skin prior to testing, as the electrodes will adhere better giving us a clearer visual EKG recording.    ? DO NOT TAKE BETA-BLOCKERS 24 HOURS PRIOR TO TESTING SUCH AS:

## 2021-02-18 ENCOUNTER — HOSPITAL ENCOUNTER (OUTPATIENT)
Age: 51
Discharge: HOME | End: 2021-02-18
Payer: COMMERCIAL

## 2021-02-18 ENCOUNTER — OFFICE VISIT (OUTPATIENT)
Dept: BARIATRICS/WEIGHT MGMT | Age: 51
End: 2021-02-18
Payer: COMMERCIAL

## 2021-02-18 ENCOUNTER — PROCEDURE VISIT (OUTPATIENT)
Dept: CARDIOLOGY CLINIC | Age: 51
End: 2021-02-18
Payer: COMMERCIAL

## 2021-02-18 ENCOUNTER — HOSPITAL ENCOUNTER (OUTPATIENT)
Dept: GENERAL RADIOLOGY | Age: 51
Discharge: HOME | End: 2021-02-18
Payer: COMMERCIAL

## 2021-02-18 VITALS
DIASTOLIC BLOOD PRESSURE: 70 MMHG | HEART RATE: 95 BPM | RESPIRATION RATE: 16 BRPM | WEIGHT: 250.8 LBS | BODY MASS INDEX: 46.15 KG/M2 | OXYGEN SATURATION: 98 % | SYSTOLIC BLOOD PRESSURE: 120 MMHG | TEMPERATURE: 97.8 F | HEIGHT: 62 IN

## 2021-02-18 DIAGNOSIS — R06.02 SOB (SHORTNESS OF BREATH) ON EXERTION: ICD-10-CM

## 2021-02-18 DIAGNOSIS — R06.09 DYSPNEA ON EXERTION: ICD-10-CM

## 2021-02-18 DIAGNOSIS — E66.01 CLASS 3 SEVERE OBESITY DUE TO EXCESS CALORIES WITH BODY MASS INDEX (BMI) OF 45.0 TO 49.9 IN ADULT, UNSPECIFIED WHETHER SERIOUS COMORBIDITY PRESENT (HCC): ICD-10-CM

## 2021-02-18 DIAGNOSIS — Z01.818 PRE-OP EVALUATION: ICD-10-CM

## 2021-02-18 DIAGNOSIS — Z82.49 FAMILY HISTORY OF EARLY CAD: ICD-10-CM

## 2021-02-18 DIAGNOSIS — E66.01 MORBID OBESITY WITH BMI OF 45.0-49.9, ADULT (HCC): Primary | ICD-10-CM

## 2021-02-18 PROCEDURE — 99213 OFFICE O/P EST LOW 20 MIN: CPT | Performed by: SURGERY

## 2021-02-18 PROCEDURE — G8427 DOCREV CUR MEDS BY ELIG CLIN: HCPCS | Performed by: SURGERY

## 2021-02-18 PROCEDURE — G8484 FLU IMMUNIZE NO ADMIN: HCPCS | Performed by: SURGERY

## 2021-02-18 PROCEDURE — 71046 X-RAY EXAM CHEST 2 VIEWS: CPT

## 2021-02-18 PROCEDURE — 93015 CV STRESS TEST SUPVJ I&R: CPT | Performed by: INTERNAL MEDICINE

## 2021-02-18 PROCEDURE — G8417 CALC BMI ABV UP PARAM F/U: HCPCS | Performed by: SURGERY

## 2021-02-18 PROCEDURE — 1036F TOBACCO NON-USER: CPT | Performed by: SURGERY

## 2021-02-18 ASSESSMENT — ENCOUNTER SYMPTOMS
RESPIRATORY NEGATIVE: 1
GASTROINTESTINAL NEGATIVE: 1
EYES NEGATIVE: 1
BACK PAIN: 1
ALLERGIC/IMMUNOLOGIC NEGATIVE: 1

## 2021-02-18 NOTE — PROGRESS NOTES
Cardiovascular: Negative. Gastrointestinal: Negative. Endocrine: Negative. Genitourinary: Negative. Musculoskeletal: Positive for back pain. Skin: Negative. Allergic/Immunologic: Negative. Neurological: Negative. Hematological: Negative. Psychiatric/Behavioral: Negative. OBJECTIVE:    /70   Pulse 95   Temp 97.8 °F (36.6 °C) (Infrared)   Resp 16   Ht 5' 2\" (1.575 m)   Wt 250 lb 12.8 oz (113.8 kg)   SpO2 98%   BMI 45.87 kg/m²      Physical Exam  Vitals signs reviewed. Constitutional:       General: She is not in acute distress. Appearance: She is obese. She is not ill-appearing, toxic-appearing or diaphoretic. HENT:      Head: Normocephalic and atraumatic. Right Ear: External ear normal.      Left Ear: External ear normal.      Nose: Nose normal.   Eyes:      General:         Right eye: No discharge. Left eye: No discharge. Extraocular Movements: Extraocular movements intact. Neck:      Musculoskeletal: Normal range of motion. Cardiovascular:      Rate and Rhythm: Normal rate. Pulmonary:      Effort: No respiratory distress. Abdominal:      Palpations: Abdomen is soft. Musculoskeletal:         General: No swelling. Skin:     General: Skin is warm. Neurological:      General: No focal deficit present. Mental Status: She is alert. ASSESSMENT & PLAN:    1. Morbid obesity with BMI of 45.0-49.9, adult ([facility])  -Daily calories ~ 1200  -Exercise: min 150 min / week. Goal 300 min / week with 2-3 strength sessions. -F/u Pulm and Psych clearances.  -Needs EGD. Consent obtained. Reviewed in detail with the patient and/or family the expected pre-operative, operative, and post-operative courses including risks, benefits, and alternatives to the procedure. The patient's questions were answered in detail and agreed to proceed with the procedure. -Will need pre procedure COVID test. The patient was counseled at length about the risks of laura Covid-19 during their perioperative period and any recovery window from their procedure. The patient was made aware that laura Covid-19  may worsen their prognosis for recovering from their procedure  and lend to a higher morbidity and/or mortality risk. All material risks, benefits, and reasonable alternatives including postponing the procedure were discussed. The patient does wish to proceed with the procedure at this time.  -Cholesterol level 213 and . Should improve with continued weight loss and dietary choices. Will recheck after surgery. As of current visit, regarding obesity-related co-morbid conditions:  DELIA [] compliant [] no longer using [] resolved per sleep study; hypertension [] medications; hyperlipidemia [] medications; GERD [] medications; DM [] insulin [] non-insulin [] no meds       Patient was encouraged to journal all food intake. Keep calorie level at approximately 1200, per discussion / plan with registered dietician. Protein intake is to be a minimum of 40-50 grams per day. Water drinking was encouraged with a goal of 64oz-128oz daily. Beverages are to be calorie free except for milk. Avoid soda. Continue to increase level of physical activity. I spent 25 minutes with the patient face to face today and over 50% of the office visit today was spent in face to face counseling regarding diet and exercise, in preparation for her planned Robotic Sleeve Gastrectomy. Discussed in length complying with the dietary recommendations, complying with the preoperative workup including dietary counseling , exercise physiologist counseling , and pre-operative optimization of pulmonologist  and cardiologist .    The patient expressed understanding and willingness to comply nicely; all questions and concerns addressed.

## 2021-02-25 ENCOUNTER — PROCEDURE VISIT (OUTPATIENT)
Dept: CARDIOLOGY CLINIC | Age: 51
End: 2021-02-25
Payer: COMMERCIAL

## 2021-02-25 DIAGNOSIS — R06.09 DYSPNEA ON EXERTION: Primary | ICD-10-CM

## 2021-02-25 LAB
LV EF: 58 %
LVEF MODALITY: NORMAL

## 2021-02-25 PROCEDURE — 93306 TTE W/DOPPLER COMPLETE: CPT | Performed by: INTERNAL MEDICINE

## 2021-03-02 ENCOUNTER — TELEPHONE (OUTPATIENT)
Dept: BARIATRICS/WEIGHT MGMT | Age: 51
End: 2021-03-02

## 2021-03-02 ENCOUNTER — TELEPHONE (OUTPATIENT)
Dept: CARDIOLOGY CLINIC | Age: 51
End: 2021-03-02

## 2021-03-02 NOTE — TELEPHONE ENCOUNTER
SPOKE TO Hyun Bell (BRANDON W/BX) SCHEDULED @ [address] [place] [address].  NOTIFIED OF DATES, TIMES AND LOCATION    PHONE ASSESSMENT  SURGERY - 04/02/21 @ 8:00, 6:00 arrival  P/O - 04/06/21 at 9:45    NPO AFTER MIDNIGHT  Covid-19 Culture on 03/26/21 @ 9:15  HOLD BLOOD THINNERS - Not on thinners   SENT

## 2021-03-02 NOTE — TELEPHONE ENCOUNTER
Left results on VM. Summary   Left ventricular systolic function is normal with an ejection fraction of   55-60%. Mild concentric left ventricular hypertrophy. Doppler evaluation reveals mild aortic, mitral, and tricuspid   regurgitation. No evidence of pericardial effusion.

## 2021-03-03 ENCOUNTER — OFFICE VISIT (OUTPATIENT)
Dept: CARDIOLOGY CLINIC | Age: 51
End: 2021-03-03
Payer: COMMERCIAL

## 2021-03-03 VITALS
WEIGHT: 253.8 LBS | HEART RATE: 80 BPM | SYSTOLIC BLOOD PRESSURE: 124 MMHG | DIASTOLIC BLOOD PRESSURE: 80 MMHG | BODY MASS INDEX: 46.7 KG/M2 | HEIGHT: 62 IN

## 2021-03-03 DIAGNOSIS — Z01.818 PRE-OPERATIVE CLEARANCE: Primary | ICD-10-CM

## 2021-03-03 PROCEDURE — G8484 FLU IMMUNIZE NO ADMIN: HCPCS | Performed by: INTERNAL MEDICINE

## 2021-03-03 PROCEDURE — 99213 OFFICE O/P EST LOW 20 MIN: CPT | Performed by: INTERNAL MEDICINE

## 2021-03-03 PROCEDURE — G8417 CALC BMI ABV UP PARAM F/U: HCPCS | Performed by: INTERNAL MEDICINE

## 2021-03-03 PROCEDURE — G8427 DOCREV CUR MEDS BY ELIG CLIN: HCPCS | Performed by: INTERNAL MEDICINE

## 2021-03-03 PROCEDURE — 1036F TOBACCO NON-USER: CPT | Performed by: INTERNAL MEDICINE

## 2021-03-03 NOTE — PROGRESS NOTES
Emiliano Alston MD                                  CARDIOLOGY  NOTE          Chief Complaint:    Chief Complaint   Patient presents with    Follow-up     Patient here to follow up from stress and echo testing. Patient denies any chest pain, shortness of breath, dizziness, palpitations, and edema. She is wanting to be cleared for gastric sleeve surgery. Pt presents for follow up   No chest pain  No dyspnea  No palpitations     Exercise Stress Test 2021    Overall Impression:  Normal exercise stress test  Good Functional Capacity        Echocardiogram 2021    Left ventricular systolic function is normal with an ejection fraction of 55-60%. Mild concentric left ventricular hypertrophy. Doppler evaluation reveals mild aortic, mitral, and tricuspid regurgitation. No evidence of pericardial effusion. Prior HPI:     Igor Gonzalez is a 53y.o. year old female with past medical history significant for morbid obesity BMI of 48.07, presents for preop assessment. Patient is anticipating gastric weight loss surgery. As part of work-up patient presents for preop cardiac risk assessment    Patient denies any chest pain  + shortness of breath with exertion. No PND/Orthopnea  No palpitations      Pt mother  at the age of 46 from MI     No prior hx of CAD, CHF, Arrhythmias     EKG  normal sinus rhythm without ST-T changes        Current Outpatient Medications   Medication Sig Dispense Refill    Multiple Vitamins-Minerals (MULTIVITAMIN WOMEN PO) Take by mouth       No current facility-administered medications for this visit. Allergies:     Levofloxacin    Patient History:    Past Medical History:   Diagnosis Date    H/O echocardiogram 2021    Mild LVH, AR, TR & MR.      Past Surgical History:   Procedure Laterality Date    APPENDECTOMY      CHOLECYSTECTOMY      HYSTERECTOMY      LITHOTRIPSY       Family History   Problem Relation Age of Onset    Asthma Mother  Heart Disease Mother      Social History     Tobacco Use    Smoking status: Never Smoker    Smokeless tobacco: Never Used   Substance Use Topics    Alcohol use: No        Review of Systems:     · Constitutional:  No Fever or Weight Loss   · Eyes: No Decreased Vision  · ENT: No Headaches, Hearing Loss or Vertigo  · Cardiovascular: No Chest Pain,  Mild Shortness of breath, No Palpitations. No Edema   · Respiratory: No cough or wheezing . No Respiratory distress   · Gastrointestinal: No abdominal pain, appetite loss, blood in stools, constipation, diarrhea or heartburn  · Genitourinary: No dysuria, trouble voiding, or hematuria  · Musculoskeletal:  denies any new  joint aches , or pain   · Integumentary: No rash or pruritis  · Neurological: No TIA or stroke symptoms  · Psychiatric: No anxiety or depression  · Endocrine: No malaise, fatigue or temperature intolerance  · Hematologic/Lymphatic: No bleeding problems, blood clots or swollen lymph nodes  · Allergic/Immunologic: No nasal congestion or hives        Objective:      Physical Exam:    /80   Pulse 80   Ht 5' 2\" (1.575 m)   Wt 253 lb 12.8 oz (115.1 kg)   BMI 46.42 kg/m²   Wt Readings from Last 3 Encounters:   03/03/21 253 lb 12.8 oz (115.1 kg)   02/24/21 250 lb (113.4 kg)   02/18/21 250 lb 12.8 oz (113.8 kg)     Body mass index is 46.42 kg/m². Vitals:    03/03/21 1059   BP: 124/80   Pulse: 80        General Appearance and Constitutional: Conversant, Well developed, Well nourished, No acute distress, Non-toxic appearance. HEENT:  Normocephalic, Atraumatic, Bilateral external ears normal, Oropharynx moist, No oral exudates,   Nose normal.   Neck- Normal range of motion, No tenderness, Supple  Eyes:  EOMI, Conjunctiva normal, No discharge. Respiratory:  Normal breath sounds, No respiratory distress, No wheezing, No Rales, No Ronchi. No chest tenderness. Cardiovascular: S1-S2, no added heart sounds, No Mumurs appreciated. No gallops, rubs. No Pedal Edema   GI:  Bowel sounds normal, Soft, No tenderness,  :  No costovertebral angle tenderness   Musculoskeletal:  No gross deformities. Back- No tenderness  Integument:  Well hydrated, no rash   Lymphatic:  No lymphadenopathy noted   Neurologic:  Alert & oriented x 3, Normal motor function, normal sensory function, no focal deficits noted   Psychiatric:  Speech and behavior appropriate       Medical decision making and Data review:    DATA:    No results found for: TROPONINT  BNP:  No results found for: PROBNP  PT/INR:  No results found for: PTINR  Lab Results   Component Value Date    LABA1C 5.4 12/02/2020     Lab Results   Component Value Date    CHOL 213 (H) 12/02/2020    TRIG 76 12/02/2020    HDL 83 12/02/2020    LDLDIRECT 128 (H) 12/02/2020     Lab Results   Component Value Date    WBC 4.5 12/02/2020    HGB 14.4 12/02/2020    HCT 44.8 12/02/2020    MCV 92.6 12/02/2020     12/02/2020     TSH: No results found for: TSH  Lab Results   Component Value Date    AST 15 12/02/2020    ALT 15 12/02/2020    BILITOT 0.9 12/02/2020    ALKPHOS 72 12/02/2020         All labs, medications and tests reviewed by myself including data and history from outside source , patient and available family . 1. Pre-operative clearance         Impression and Plan:      1. Morbid Obesity: BMI 48.07 Low salt diet and exercise as tolerated advised  2. Dyspnea on exertion  3. Premature CAD in family   4. Pre op Risk Assessment     Exercise stress test for risk stratification: Negative for Ischemia, good functional capacity  Echocardiogram to rule out structural heart disease : Within normal Limits    Pt is low risk for non cardiac surgery   Please call us with any further questions       Return if symptoms worsen or fail to improve.

## 2021-03-18 ENCOUNTER — OFFICE VISIT (OUTPATIENT)
Dept: BARIATRICS/WEIGHT MGMT | Age: 51
End: 2021-03-18
Payer: COMMERCIAL

## 2021-03-18 VITALS
HEART RATE: 90 BPM | WEIGHT: 247.1 LBS | TEMPERATURE: 97.7 F | BODY MASS INDEX: 45.47 KG/M2 | OXYGEN SATURATION: 97 % | HEIGHT: 62 IN | SYSTOLIC BLOOD PRESSURE: 112 MMHG | DIASTOLIC BLOOD PRESSURE: 76 MMHG

## 2021-03-18 DIAGNOSIS — E66.01 MORBID OBESITY WITH BMI OF 45.0-49.9, ADULT (HCC): Primary | ICD-10-CM

## 2021-03-18 PROCEDURE — 1036F TOBACCO NON-USER: CPT | Performed by: SURGERY

## 2021-03-18 PROCEDURE — G8417 CALC BMI ABV UP PARAM F/U: HCPCS | Performed by: SURGERY

## 2021-03-18 PROCEDURE — G8427 DOCREV CUR MEDS BY ELIG CLIN: HCPCS | Performed by: SURGERY

## 2021-03-18 PROCEDURE — 99213 OFFICE O/P EST LOW 20 MIN: CPT | Performed by: SURGERY

## 2021-03-18 PROCEDURE — G8484 FLU IMMUNIZE NO ADMIN: HCPCS | Performed by: SURGERY

## 2021-03-18 NOTE — PROGRESS NOTES
BARIATRIC SURGERY OFFICE PROGRESS NOTE    SUBJECTIVE:    Patient presenting today referred from No primary care provider on file. , for   Chief Complaint   Patient presents with    Follow-up     F/U 5th wm visit   . Vitals:    03/18/21 1056   BP: 112/76   Pulse: 90   Temp: 97.7 °F (36.5 °C)   SpO2: 97%        BMI: Body mass index is 45.2 kg/m². Weight History: Wt Readings from Last 3 Encounters:   03/18/21 247 lb 1.6 oz (112.1 kg)   03/03/21 253 lb 12.8 oz (115.1 kg)   02/24/21 250 lb (113.4 kg)        If within 30 days of bariatric surgery date, have you been to the ED: Sánchez Guzmán is a 53 y.o. female presenting in fifth bariatric PRE-OP visit    Total weight loss/gain: -3.7 lbs since last visit, n/a lbs since surgery, -12.1 lbs since starting program     Walking on treadmill or outside daily. [id number] sarai / day. No changes in health. No new issues. Was cleared by Cards and Pulm. She was also seen by Psych. Thoroughly reviewed the patient's medical history, family history, social history and review of systems with the patient today in the office. Please see medical record for pertinent positives. Past Medical History:   Diagnosis Date    H/O echocardiogram 02/25/2021    Mild LVH, AR, TR & MR. Patient Active Problem List   Diagnosis    Lipoma of right forearm       Past Surgical History:   Procedure Laterality Date    APPENDECTOMY      CHOLECYSTECTOMY      HYSTERECTOMY      LITHOTRIPSY         Current Outpatient Medications   Medication Sig Dispense Refill    Multiple Vitamins-Minerals (MULTIVITAMIN WOMEN PO) Take by mouth       No current facility-administered medications for this visit. Allergies   Allergen Reactions    Levofloxacin Shortness Of Breath         Review of Systems   All other systems reviewed and are negative.       OBJECTIVE:    /76   Pulse 90   Temp 97.7 °F (36.5 °C)   Ht 5' 2\" (1.575 m)   Wt 247 lb 1.6 oz (112.1 kg)   SpO2 97%   BMI 45.20 kg/m²      Physical Exam  Vitals signs reviewed. Constitutional:       General: She is not in acute distress. Appearance: She is obese. She is not ill-appearing, toxic-appearing or diaphoretic. HENT:      Head: Normocephalic and atraumatic. Right Ear: External ear normal.      Left Ear: External ear normal.      Nose: Nose normal.   Eyes:      General:         Right eye: No discharge. Left eye: No discharge. Extraocular Movements: Extraocular movements intact. Neck:      Musculoskeletal: Normal range of motion. Cardiovascular:      Rate and Rhythm: Normal rate. Pulmonary:      Effort: No respiratory distress. Abdominal:      Palpations: Abdomen is soft. Musculoskeletal:         General: No swelling. Skin:     General: Skin is warm. Neurological:      General: No focal deficit present. Mental Status: She is alert. Psychiatric:         Mood and Affect: Mood normal.         ASSESSMENT & PLAN:    1. Morbid obesity with BMI of 45.0-49.9, adult ([facility]  -reviewed Cards, Pulm clearances.  -Awaiting psych clearance letter.  -Needs EGD. Consent obtained. Reviewed in detail with the patient and/or family the expected pre-operative, operative, and post-operative courses including risks, benefits, and alternatives to the procedure. The patient's questions were answered in detail and agreed to proceed with the procedure.   -Will need pre op COVID test. The patient was counseled at length about the risks of laura Covid-19 during their perioperative period and any recovery window from their procedure. The patient was made aware that laura Covid-19  may worsen their prognosis for recovering from their procedure  and lend to a higher morbidity and/or mortality risk. All material risks, benefits, and reasonable alternatives including postponing the procedure were discussed. The patient does wish to proceed with the procedure at this time. -Continue daily exercise.  -Calories 6735-2621.  -Overall, doing well in program.                As of current visit, regarding obesity-related co-morbid conditions:  DELIA [] compliant [] no longer using [] resolved per sleep study; hypertension [] medications; hyperlipidemia [] medications; GERD [] medications; DM [] insulin [] non-insulin [] no meds       Patient was encouraged to journal all food intake. Keep calorie level at approximately 3375-6328, per discussion / plan with registered dietician. Protein intake is to be a minimum of 40-50 grams per day. Water drinking was encouraged with a goal of 64oz-128oz daily. Beverages are to be calorie free except for milk. Avoid soda. Continue to increase level of physical activity. I spent 25 minutes with the patient face to face today and over 50% of the office visit today was spent in face to face counseling regarding diet and exercise, in preparation for her planned Robotic Sleeve Gastrectomy. Discussed in length complying with the dietary recommendations, complying with the preoperative workup including dietary counseling , exercise physiologist counseling , and pre-operative optimization of pulmonologist  and cardiologist     The patient expressed understanding and willingness to comply nicely; all questions and concerns addressed. No orders of the defined types were placed in this encounter. No orders of the defined types were placed in this encounter. Follow Up:  No follow-ups on file.     [address] [place] [address]

## 2021-03-25 DIAGNOSIS — Z01.818 PRE-OP TESTING: Primary | ICD-10-CM

## 2021-03-26 ENCOUNTER — HOSPITAL ENCOUNTER (OUTPATIENT)
Age: 51
Setting detail: SPECIMEN
Discharge: HOME | End: 2021-03-26
Payer: COMMERCIAL

## 2021-03-26 ENCOUNTER — NURSE ONLY (OUTPATIENT)
Dept: SURGERY | Age: 51
End: 2021-03-26
Payer: COMMERCIAL

## 2021-03-26 VITALS
OXYGEN SATURATION: 98 % | TEMPERATURE: 97.1 F | SYSTOLIC BLOOD PRESSURE: 110 MMHG | HEART RATE: 76 BPM | DIASTOLIC BLOOD PRESSURE: 70 MMHG

## 2021-03-26 DIAGNOSIS — Z01.818 PRE-OP TESTING: Primary | ICD-10-CM

## 2021-03-26 PROCEDURE — 99211 OFF/OP EST MAY X REQ PHY/QHP: CPT | Performed by: SURGERY

## 2021-03-26 PROCEDURE — U0003 INFECTIOUS AGENT DETECTION BY NUCLEIC ACID (DNA OR RNA); SEVERE ACUTE RESPIRATORY SYNDROME CORONAVIRUS 2 (SARS-COV-2) (CORONAVIRUS DISEASE [COVID-19]), AMPLIFIED PROBE TECHNIQUE, MAKING USE OF HIGH THROUGHPUT TECHNOLOGIES AS DESCRIBED BY CMS-2020-01-R: HCPCS

## 2021-03-26 PROCEDURE — U0005 INFEC AGEN DETEC AMPLI PROBE: HCPCS

## 2021-03-26 NOTE — PROGRESS NOTES
Patient collected pre-op COVID-19 screening instruction and collection supplies given to patient accordingly. Patient denies fever/cough/sob or recent travels. Patient voiced understanding of collection/quarantine instructions. COVID screening lab ordered, collection completed without difficulty, identifiers placed on specimen. AVS given at discharge. Results will be given via mychart or telephone call [facility] Dept will manage any positive test results, the procedure will be rescheduled at a later date).

## 2021-03-26 NOTE — PATIENT INSTRUCTIONS
Pre-Procedure COVID-19 Self Testing  Quarantine Instructions  Day of Surgery Instructions         What to do before my surgery:    All patients scheduled for elective surgery must test for COVID19 72-96 hours prior to the surgery date.  Pre-Procedure COVID-19 Self-Test will be scheduled for you by your provider.  You can receive your Pre-Procedure COVID-19 Self-Test at:  [facility]. [address] [facility], [facility], [facility], [address],[address]   If you do not have the COVID-19 test we will cancel or reschedule your procedure   Once you test you must quarantine at home until after your procedure with only your immediate family members or whoever lives with you.  If you must work during your quarantine period, we ask that you continue to practice social distancing, wear a mask that covers your mouth and nose and perform all hand hygiene as recommended by the CDC.  If you must go to the grocery, etc. and cannot get someone to do this for you please wear a mask that covers your mouth and nose and perform all hand hygiene as recommended by the CDC.  Your surgeon's office will notify you with any concerns about your test result. What can I expect on the day of surgery?  Arrive at the time the office or hospital staff tell you on the day of your procedure.  Wear a mask when entering the hospital.     A member of the hospital staff will take your temperature and ask you a few questions as you enter the building.  In abundance of caution for the safety of all our patients and staff, please follow all hospital visitor guidelines in place at the time of your procedure. The staff caring for you will stay in close communication with your loved one and keep them updated on progress.  Please provide a phone number for us to use when communicating with your family or ride home.    When you are ready to discharge, we will notify your family/person with you to bring the car to the front entrance. We will take you to them after you receive all of your discharge instructions.

## 2021-03-28 LAB
SARS-COV-2: DETECTED
SOURCE: ABNORMAL

## 2021-03-29 ENCOUNTER — TELEPHONE (OUTPATIENT)
Dept: BARIATRICS/WEIGHT MGMT | Age: 51
End: 2021-03-29

## 2021-03-29 NOTE — TELEPHONE ENCOUNTER
Called patient said Mitchell came back posittive they have canceled EGD for 4/2. Advised we will get rescheduled, but have to wait 30 days. Patient understood.

## 2021-04-06 ENCOUNTER — OFFICE VISIT (OUTPATIENT)
Dept: BARIATRICS/WEIGHT MGMT | Age: 51
End: 2021-04-06
Payer: COMMERCIAL

## 2021-04-06 VITALS
BODY MASS INDEX: 45.47 KG/M2 | HEIGHT: 62 IN | WEIGHT: 247.1 LBS | OXYGEN SATURATION: 97 % | DIASTOLIC BLOOD PRESSURE: 70 MMHG | SYSTOLIC BLOOD PRESSURE: 110 MMHG | HEART RATE: 67 BPM

## 2021-04-06 DIAGNOSIS — E66.01 MORBID OBESITY DUE TO EXCESS CALORIES (HCC): Primary | ICD-10-CM

## 2021-04-06 PROCEDURE — G8427 DOCREV CUR MEDS BY ELIG CLIN: HCPCS | Performed by: SURGERY

## 2021-04-06 PROCEDURE — 99213 OFFICE O/P EST LOW 20 MIN: CPT | Performed by: SURGERY

## 2021-04-06 PROCEDURE — 1036F TOBACCO NON-USER: CPT | Performed by: SURGERY

## 2021-04-06 PROCEDURE — G8417 CALC BMI ABV UP PARAM F/U: HCPCS | Performed by: SURGERY

## 2021-04-06 ASSESSMENT — ENCOUNTER SYMPTOMS
GASTROINTESTINAL NEGATIVE: 1
EYES NEGATIVE: 1
ALLERGIC/IMMUNOLOGIC NEGATIVE: 1

## 2021-04-06 NOTE — PROGRESS NOTES
Musculoskeletal: Negative. Skin: Negative. Allergic/Immunologic: Negative. Neurological: Negative. Hematological: Negative. Psychiatric/Behavioral: Negative. All other systems reviewed and are negative. OBJECTIVE:    /70 (Site: Left Upper Arm, Position: Sitting, Cuff Size: Large Adult)   Pulse 67   Ht 5' 2\" (1.575 m)   Wt 247 lb 1.6 oz (112.1 kg)   SpO2 97%   BMI 45.20 kg/m²      Physical Exam  Vitals signs reviewed. Constitutional:       General: She is not in acute distress. Appearance: She is obese. She is not ill-appearing, toxic-appearing or diaphoretic. HENT:      Head: Normocephalic and atraumatic. Right Ear: External ear normal.      Left Ear: External ear normal.      Nose: Nose normal.   Eyes:      General:         Right eye: No discharge. Left eye: No discharge. Extraocular Movements: Extraocular movements intact. Neck:      Musculoskeletal: Normal range of motion. Cardiovascular:      Rate and Rhythm: Normal rate. Pulses: Normal pulses. Pulmonary:      Effort: No respiratory distress. Abdominal:      Palpations: Abdomen is soft. Tenderness: There is no abdominal tenderness. Musculoskeletal:         General: No swelling. Skin:     General: Skin is warm. Neurological:      General: No focal deficit present. Mental Status: She is alert. Psychiatric:         Mood and Affect: Mood normal.         ASSESSMENT & PLAN:    1. Morbid obesity due to excess calories ([address]  -Reviewed psych clearance. Done.   -Needs EGD. Last was canceled 2/2 positive COVID. Consent obtained. Reviewed in detail with the patient and/or family the expected pre-operative, operative, and post-operative courses including risks, benefits, and alternatives to the procedure. The patient's questions were answered in detail and agreed to proceed with the procedure.   -Will need repeat COVID prior to procedure.  -Continue tracking calories and exercising. -F/u in one month. -Call with any questions, concerns, or issues whatsoever. As of current visit, regarding obesity-related co-morbid conditions:  DELIA [] compliant [] no longer using [] resolved per sleep study; hypertension [] medications; hyperlipidemia [] medications; GERD [] medications; DM [] insulin [] non-insulin [] no meds       Patient was encouraged to journal all food intake. Keep calorie level at approximately 1200, per discussion / plan with registered dietician. Protein intake is to be a minimum of 40-50 grams per day. Water drinking was encouraged with a goal of 64oz-128oz daily. Beverages are to be calorie free except for milk. Avoid soda and other carbonated beverages. Continue to increase level of physical activity. I spent 25 minutes with the patient face to face today and over 50% of the office visit today was spent in face to face counseling regarding diet and exercise, in preparation for her planned robotic sleeve gastrectomy. Discussed in length complying with the dietary recommendations, complying with the preoperative workup including dietary counseling , exercise physiologist counseling , and pre-operative optimization of pulmonologist  and cardiologist .    The patient expressed understanding and willingness to comply nicely; all questions and concerns addressed. No orders of the defined types were placed in this encounter. No orders of the defined types were placed in this encounter. Follow Up:  No follow-ups on file.     [address] [place] [address]

## 2021-04-20 ENCOUNTER — TELEPHONE (OUTPATIENT)
Dept: BARIATRICS/WEIGHT MGMT | Age: 51
End: 2021-04-20

## 2021-04-20 NOTE — TELEPHONE ENCOUNTER
/ LEFT MESSAGE FOR Melanie Donnelly REGARDING SURGERY (EGD WITH BX) SCHEDULED @ [facility].  NOTIFIED OF DATES, TIMES AND LOCATION    PHONE ASSESSMENT   COVID - 5/21/21 @ 288  SURGERY - 5/28/21 @800  P/O -     NPO AFTER MIDNIGHT  HOLD BLOOD THINNERS - 5 DAYS PRIOR IF TAKING ANY

## 2021-05-05 ENCOUNTER — TELEPHONE (OUTPATIENT)
Dept: BARIATRICS/WEIGHT MGMT | Age: 51
End: 2021-05-05

## 2021-05-20 ENCOUNTER — OFFICE VISIT (OUTPATIENT)
Dept: BARIATRICS/WEIGHT MGMT | Age: 51
End: 2021-05-20
Payer: COMMERCIAL

## 2021-05-20 VITALS
TEMPERATURE: 98.2 F | OXYGEN SATURATION: 98 % | HEIGHT: 62 IN | BODY MASS INDEX: 45.47 KG/M2 | DIASTOLIC BLOOD PRESSURE: 72 MMHG | SYSTOLIC BLOOD PRESSURE: 110 MMHG | WEIGHT: 247.1 LBS | HEART RATE: 82 BPM

## 2021-05-20 DIAGNOSIS — Z01.818 PRE-OP TESTING: Primary | ICD-10-CM

## 2021-05-20 DIAGNOSIS — E66.01 MORBID OBESITY WITH BMI OF 45.0-49.9, ADULT (HCC): ICD-10-CM

## 2021-05-20 DIAGNOSIS — Z01.818 PREOPERATIVE CLEARANCE: Primary | ICD-10-CM

## 2021-05-20 PROCEDURE — G8417 CALC BMI ABV UP PARAM F/U: HCPCS | Performed by: SURGERY

## 2021-05-20 PROCEDURE — G8427 DOCREV CUR MEDS BY ELIG CLIN: HCPCS | Performed by: SURGERY

## 2021-05-20 PROCEDURE — 1036F TOBACCO NON-USER: CPT | Performed by: SURGERY

## 2021-05-20 PROCEDURE — 3017F COLORECTAL CA SCREEN DOC REV: CPT | Performed by: SURGERY

## 2021-05-20 PROCEDURE — 99213 OFFICE O/P EST LOW 20 MIN: CPT | Performed by: SURGERY

## 2021-05-20 RX ORDER — MENTHOL 5.8 MG/1
LOZENGE ORAL
COMMUNITY
Start: 2021-05-03 | End: 2021-05-26

## 2021-05-20 ASSESSMENT — ENCOUNTER SYMPTOMS: BACK PAIN: 1

## 2021-05-20 NOTE — PROGRESS NOTES
BARIATRIC SURGERY OFFICE PROGRESS NOTE    SUBJECTIVE:    Patient presenting today referred from No primary care provider on file. , for   Chief Complaint   Patient presents with    Follow-up     7th  visit, EGD 5/28/21   . Vitals:    05/20/21 1359   BP: 110/72   Pulse: 82   Temp: 98.2 °F (36.8 °C)   SpO2: 98%        BMI: Body mass index is 45.2 kg/m². Weight History: Wt Readings from Last 3 Encounters:   05/20/21 247 lb 1.6 oz (112.1 kg)   04/06/21 247 lb 1.6 oz (112.1 kg)   03/18/21 247 lb 1.6 oz (112.1 kg)        If within 30 days of bariatric surgery date, have you been to the ED: Lisa Smith is a 49 y.o. female presenting in seventh bariatric PRE-OP visit. Total weight loss/gain: +0.5 lbs since last visit, n/a lbs since surgery, -11.6 lbs since starting program    Thoroughly reviewed the patient's medical history, family history, social history and review of systems with the patient today in the office. Please see medical record for pertinent positives. Changes in health since last visit: none, denies. Pt tracking calories: yes, 1200 / day. Pt exercising: yes. Was just in [place] so did take a break. Past Medical History:   Diagnosis Date    H/O echocardiogram 02/25/2021    Mild LVH, AR, TR & MR. Patient Active Problem List   Diagnosis    Lipoma of right forearm       Past Surgical History:   Procedure Laterality Date    APPENDECTOMY      CHOLECYSTECTOMY      HYSTERECTOMY      LITHOTRIPSY         Current Outpatient Medications   Medication Sig Dispense Refill    Multiple Vitamins-Minerals (MULTIVITAMIN WOMEN PO) Take by mouth      V-R STOOL SOFTENER 100 MG capsule TAKE 1 SOFTGEL BY MOUTH ONCE DAILY AT BEDTIME AS NEEDED (Patient not taking: Reported on 5/20/2021)       No current facility-administered medications for this visit.         Allergies   Allergen Reactions    Levofloxacin Shortness Of Breath         Review of Systems   Musculoskeletal: Positive for arthralgias and back pain. All other systems reviewed and are negative. OBJECTIVE:    /72   Pulse 82   Temp 98.2 °F (36.8 °C)   Ht 5' 2\" (1.575 m)   Wt 247 lb 1.6 oz (112.1 kg)   SpO2 98%   BMI 45.20 kg/m²      Physical Exam  Vitals reviewed. Constitutional:       General: She is not in acute distress. Appearance: Normal appearance. She is obese. She is not ill-appearing, toxic-appearing or diaphoretic. HENT:      Head: Normocephalic and atraumatic. Right Ear: External ear normal.      Left Ear: External ear normal.      Nose: Nose normal.   Eyes:      General:         Right eye: No discharge. Left eye: No discharge. Extraocular Movements: Extraocular movements intact. Cardiovascular:      Rate and Rhythm: Normal rate. Pulmonary:      Effort: Pulmonary effort is normal.   Abdominal:      General: There is no distension. Tenderness: There is no abdominal tenderness. Musculoskeletal:         General: No swelling. Cervical back: Normal range of motion. Skin:     General: Skin is warm. Capillary Refill: Capillary refill takes less than 2 seconds. Neurological:      General: No focal deficit present. Mental Status: She is alert. Psychiatric:         Mood and Affect: Mood normal.         ASSESSMENT & PLAN:    1. Preoperative clearance  - Nicotine Metabolites, Urine; Future  - DRUG SCREEN MULTI URINE; Future    2. Morbid obesity with BMI of 45.0-49.9, adult ([facility]  -doing well. Down 11.6 lbs since starting program.  -EGD next week. Consent previously obtained. COVID testing tomorrow.  -All pre op consults done. -F/u in one month.                As of current visit, regarding obesity-related co-morbid conditions:  DELIA [] compliant [] no longer using [] resolved per sleep study; hypertension [] medications; hyperlipidemia [] medications; GERD [] medications; DM [] insulin [] non-insulin [] no meds       Patient was encouraged to journal all food intake. Keep calorie level at approximately 1200, per discussion / plan with registered dietician. Protein intake is to be a minimum of 40-50 grams per day. Water drinking was encouraged with a goal of 64oz-128oz daily. Beverages are to be calorie free except for milk. Avoid soda and other carbonated beverages. Continue to increase level of physical activity. I spent 25 minutes with the patient face to face today and over 50% of the office visit today was spent in face to face counseling regarding diet and exercise, in preparation for her planned robotic sleeve gastrectomy. Discussed in length complying with the dietary recommendations, complying with the preoperative workup including dietary counseling , exercise physiologist counseling , and pre-operative optimization of pulmonologist  and cardiologist .    The patient expressed understanding and willingness to comply nicely; all questions and concerns addressed. No orders of the defined types were placed in this encounter. Orders Placed This Encounter   Procedures    Nicotine Metabolites, Urine     Standing Status:   Future     Standing Expiration Date:   5/20/2022    DRUG SCREEN MULTI URINE     Standing Status:   Future     Standing Expiration Date:   5/20/2022       Follow Up:  No follow-ups on file.     Eliana Chan MD

## 2021-05-21 ENCOUNTER — HOSPITAL ENCOUNTER (OUTPATIENT)
Age: 51
Setting detail: SPECIMEN
Discharge: HOME | End: 2021-05-21
Payer: COMMERCIAL

## 2021-05-21 ENCOUNTER — NURSE ONLY (OUTPATIENT)
Dept: SURGERY | Age: 51
End: 2021-05-21
Payer: COMMERCIAL

## 2021-05-21 VITALS
SYSTOLIC BLOOD PRESSURE: 130 MMHG | BODY MASS INDEX: 45.18 KG/M2 | DIASTOLIC BLOOD PRESSURE: 80 MMHG | WEIGHT: 247 LBS | HEART RATE: 86 BPM | OXYGEN SATURATION: 100 %

## 2021-05-21 DIAGNOSIS — Z01.818 PRE-OP TESTING: Primary | ICD-10-CM

## 2021-05-21 LAB
AMPHETAMINES: NEGATIVE
BARBITURATE SCREEN URINE: NEGATIVE
BENZODIAZEPINE SCREEN, URINE: NEGATIVE
CANNABINOID SCREEN URINE: NEGATIVE
COCAINE METABOLITE: NEGATIVE
OPIATES, URINE: NEGATIVE
OXYCODONE: NEGATIVE
PHENCYCLIDINE, URINE: NEGATIVE

## 2021-05-21 PROCEDURE — G0480 DRUG TEST DEF 1-7 CLASSES: HCPCS

## 2021-05-21 PROCEDURE — 80307 DRUG TEST PRSMV CHEM ANLYZR: CPT

## 2021-05-21 PROCEDURE — U0003 INFECTIOUS AGENT DETECTION BY NUCLEIC ACID (DNA OR RNA); SEVERE ACUTE RESPIRATORY SYNDROME CORONAVIRUS 2 (SARS-COV-2) (CORONAVIRUS DISEASE [COVID-19]), AMPLIFIED PROBE TECHNIQUE, MAKING USE OF HIGH THROUGHPUT TECHNOLOGIES AS DESCRIBED BY CMS-2020-01-R: HCPCS

## 2021-05-21 PROCEDURE — 99211 OFF/OP EST MAY X REQ PHY/QHP: CPT | Performed by: SURGERY

## 2021-05-21 PROCEDURE — U0005 INFEC AGEN DETEC AMPLI PROBE: HCPCS

## 2021-05-21 NOTE — PROGRESS NOTES

## 2021-05-22 LAB
SARS-COV-2: NOT DETECTED
SOURCE: NORMAL

## 2021-05-26 LAB
3-OH-COTININE URINE: <50 NG/ML
ANABASINE URINE: <3 NG/ML
COTININE, URINE: <5 NG/ML
NICOTINE AND METABOLITES: <2 NG/ML
NORNICOTINE URINE: <2 NG/ML

## 2021-05-27 ENCOUNTER — ANESTHESIA EVENT (OUTPATIENT)
Dept: ENDOSCOPY | Age: 51
End: 2021-05-27
Payer: COMMERCIAL

## 2021-05-27 NOTE — ANESTHESIA PRE PROCEDURE
Department of Anesthesiology  Preprocedure Note       Name:  Cecilia Goodpasture   Age:  49 y.o.  :  1970                                          MRN:  [id number]         Date:  2021      Surgeon: Madai Arellano):  Gregory Herrera MD    Procedure: Procedure(s):  EGD BIOPSY    Medications prior to admission:   Prior to Admission medications    Medication Sig Start Date End Date Taking? Authorizing Provider   Multiple Vitamins-Minerals (MULTIVITAMIN WOMEN PO) Take by mouth    Historical Provider, MD       Current medications:    No current facility-administered medications for this encounter. Current Outpatient Medications   Medication Sig Dispense Refill    Multiple Vitamins-Minerals (MULTIVITAMIN WOMEN PO) Take by mouth         Allergies: Allergies   Allergen Reactions    Levofloxacin Shortness Of Breath       Problem List:    Patient Active Problem List   Diagnosis Code    Lipoma of right forearm D17.21       Past Medical History:        Diagnosis Date    COVID-19     \"had + covid test 2021- only symptom was headache\"- negative covid test 2021    H/O echocardiogram 2021    Mild LVH, AR, TR & MR.    History of kidney stones     Wears dentures     full upper plate    Wears glasses     to read       Past Surgical History:        Procedure Laterality Date    [facility]  ?    [facility]    CYSTOSCOPY  ?    stone manipulation    DILATION AND CURETTAGE OF UTERUS      HYSTERECTOMY      LITHOTRIPSY      ( not sure if had this done-per pt)       Social History:    Social History     Tobacco Use    Smoking status: Never Smoker    Smokeless tobacco: Never Used   Substance Use Topics    Alcohol use: No     Comment: average \"1-2 times per year                                Counseling given: Not Answered      Vital Signs (Current):   Vitals:    21 1358   Weight: 247 lb (112 kg)   Height: 5' 2\" (1.575 m) BP Readings from Last 3 Encounters:   05/21/21 130/80   05/20/21 110/72   04/06/21 110/70       NPO Status:                                                                                 BMI:   Wt Readings from Last 3 Encounters:   05/21/21 247 lb (112 kg)   05/20/21 247 lb 1.6 oz (112.1 kg)   04/06/21 247 lb 1.6 oz (112.1 kg)     Body mass index is 45.18 kg/m². CBC:   Lab Results   Component Value Date    WBC 4.5 12/02/2020    RBC 4.84 12/02/2020    HGB 14.4 12/02/2020    HCT 44.8 12/02/2020    MCV 92.6 12/02/2020    RDW 13.1 12/02/2020     12/02/2020       CMP:   Lab Results   Component Value Date     12/02/2020    K 4.7 12/02/2020     12/02/2020    CO2 26 12/02/2020    BUN 10 12/02/2020    CREATININE 0.9 12/02/2020    GFRAA >60 12/02/2020    LABGLOM >60 12/02/2020    GLUCOSE 92 12/02/2020    PROT 6.9 12/02/2020    PROT 7.2 12/25/2010    CALCIUM 9.4 12/02/2020    BILITOT 0.9 12/02/2020    ALKPHOS 72 12/02/2020    AST 15 12/02/2020    ALT 15 12/02/2020       POC Tests: No results for input(s): POCGLU, POCNA, POCK, POCCL, POCBUN, POCHEMO, POCHCT in the last 72 hours.     Coags:   Lab Results   Component Value Date    PROTIME 10.8 12/25/2010    INR 0.99 12/25/2010    APTT 24.7 12/25/2010       HCG (If Applicable):   Lab Results   Component Value Date    PREGTESTUR NEGATIVE 10/06/2014        ABGs: No results found for: PHART, PO2ART, VAQ9GAN, KZK6SMS, BEART, G2WYBDSM     Type & Screen (If Applicable):  No results found for: LABABO, LABRH    Drug/Infectious Status (If Applicable):  No results found for: HIV, HEPCAB    COVID-19 Screening (If Applicable):   Lab Results   Component Value Date    COVID19 NOT DETECTED 05/21/2021           Anesthesia Evaluation  Patient summary reviewed  Airway:         Dental:          Pulmonary:Negative Pulmonary ROS                              Cardiovascular:Negative CV ROS             Beta Blocker:  Not on Beta Blocker      ROS comment: Echo 2/2021:  Summary   Left ventricular systolic function is normal with an ejection fraction of   55-60%. Mild concentric left ventricular hypertrophy. Doppler evaluation reveals mild aortic, mitral, and tricuspid   regurgitation. No evidence of pericardial effusion. Stress test 2/2021:  Summary   Overall Impression:   Normal exercise stress test   Good Functional Capacity     Neuro/Psych:   Negative Neuro/Psych ROS              GI/Hepatic/Renal:   (+) morbid obesity          Endo/Other: Negative Endo/Other ROS                    Abdominal:           Vascular: negative vascular ROS. Anesthesia Plan      MAC     ASA 2       Induction: intravenous. UZMA Connolly - CRNA   5/27/2021       Pre Anesthesia Assessment complete.  Chart reviewed on 5/27/2021

## 2021-05-28 ENCOUNTER — ANESTHESIA (OUTPATIENT)
Dept: ENDOSCOPY | Age: 51
End: 2021-05-28
Payer: COMMERCIAL

## 2021-05-28 ENCOUNTER — HOSPITAL ENCOUNTER (OUTPATIENT)
Age: 51
Setting detail: OUTPATIENT SURGERY
Discharge: HOME | End: 2021-05-28
Attending: SURGERY | Admitting: SURGERY
Payer: COMMERCIAL

## 2021-05-28 VITALS
DIASTOLIC BLOOD PRESSURE: 76 MMHG | SYSTOLIC BLOOD PRESSURE: 136 MMHG | RESPIRATION RATE: 12 BRPM | TEMPERATURE: 98.6 F | OXYGEN SATURATION: 100 %

## 2021-05-28 VITALS
WEIGHT: 247 LBS | TEMPERATURE: 97 F | HEIGHT: 62 IN | HEART RATE: 71 BPM | SYSTOLIC BLOOD PRESSURE: 114 MMHG | DIASTOLIC BLOOD PRESSURE: 65 MMHG | OXYGEN SATURATION: 95 % | BODY MASS INDEX: 45.45 KG/M2 | RESPIRATION RATE: 16 BRPM

## 2021-05-28 PROCEDURE — 88342 IMHCHEM/IMCYTCHM 1ST ANTB: CPT

## 2021-05-28 PROCEDURE — 2580000003 HC RX 258: Performed by: ANESTHESIOLOGY

## 2021-05-28 PROCEDURE — 7100000010 HC PHASE II RECOVERY - FIRST 15 MIN: Performed by: SURGERY

## 2021-05-28 PROCEDURE — 88305 TISSUE EXAM BY PATHOLOGIST: CPT

## 2021-05-28 PROCEDURE — 6360000002 HC RX W HCPCS: Performed by: NURSE ANESTHETIST, CERTIFIED REGISTERED

## 2021-05-28 PROCEDURE — 2500000003 HC RX 250 WO HCPCS: Performed by: NURSE ANESTHETIST, CERTIFIED REGISTERED

## 2021-05-28 PROCEDURE — 3700000001 HC ADD 15 MINUTES (ANESTHESIA): Performed by: SURGERY

## 2021-05-28 PROCEDURE — 3700000000 HC ANESTHESIA ATTENDED CARE: Performed by: SURGERY

## 2021-05-28 PROCEDURE — 3609012400 HC EGD TRANSORAL BIOPSY SINGLE/MULTIPLE: Performed by: SURGERY

## 2021-05-28 PROCEDURE — 43239 EGD BIOPSY SINGLE/MULTIPLE: CPT | Performed by: SURGERY

## 2021-05-28 PROCEDURE — 2709999900 HC NON-CHARGEABLE SUPPLY: Performed by: SURGERY

## 2021-05-28 PROCEDURE — 7100000011 HC PHASE II RECOVERY - ADDTL 15 MIN: Performed by: SURGERY

## 2021-05-28 RX ORDER — FENTANYL CITRATE 50 UG/ML
INJECTION, SOLUTION INTRAMUSCULAR; INTRAVENOUS PRN
Status: DISCONTINUED | OUTPATIENT
Start: 2021-05-28 | End: 2021-05-28 | Stop reason: SDUPTHER

## 2021-05-28 RX ORDER — LIDOCAINE HYDROCHLORIDE 20 MG/ML
INJECTION, SOLUTION INFILTRATION; PERINEURAL PRN
Status: DISCONTINUED | OUTPATIENT
Start: 2021-05-28 | End: 2021-05-28 | Stop reason: SDUPTHER

## 2021-05-28 RX ORDER — SODIUM CHLORIDE, SODIUM LACTATE, POTASSIUM CHLORIDE, CALCIUM CHLORIDE 600; 310; 30; 20 MG/100ML; MG/100ML; MG/100ML; MG/100ML
INJECTION, SOLUTION INTRAVENOUS CONTINUOUS
Status: DISCONTINUED | OUTPATIENT
Start: 2021-05-28 | End: 2021-05-28 | Stop reason: HOSPADM

## 2021-05-28 RX ORDER — PROPOFOL 10 MG/ML
INJECTION, EMULSION INTRAVENOUS PRN
Status: DISCONTINUED | OUTPATIENT
Start: 2021-05-28 | End: 2021-05-28 | Stop reason: SDUPTHER

## 2021-05-28 RX ADMIN — FENTANYL CITRATE 25 MCG: 50 INJECTION, SOLUTION INTRAMUSCULAR; INTRAVENOUS at 08:21

## 2021-05-28 RX ADMIN — PROPOFOL 30 MG: 10 INJECTION, EMULSION INTRAVENOUS at 08:17

## 2021-05-28 RX ADMIN — FENTANYL CITRATE 50 MCG: 50 INJECTION, SOLUTION INTRAMUSCULAR; INTRAVENOUS at 08:19

## 2021-05-28 RX ADMIN — SODIUM CHLORIDE, POTASSIUM CHLORIDE, SODIUM LACTATE AND CALCIUM CHLORIDE: 600; 310; 30; 20 INJECTION, SOLUTION INTRAVENOUS at 07:03

## 2021-05-28 RX ADMIN — FENTANYL CITRATE 25 MCG: 50 INJECTION, SOLUTION INTRAMUSCULAR; INTRAVENOUS at 08:22

## 2021-05-28 RX ADMIN — PROPOFOL 150 MG: 10 INJECTION, EMULSION INTRAVENOUS at 08:19

## 2021-05-28 RX ADMIN — PROPOFOL 80 MG: 10 INJECTION, EMULSION INTRAVENOUS at 08:16

## 2021-05-28 RX ADMIN — LIDOCAINE HYDROCHLORIDE 100 MG: 20 INJECTION, SOLUTION INFILTRATION; PERINEURAL at 08:16

## 2021-05-28 ASSESSMENT — PAIN - FUNCTIONAL ASSESSMENT: PAIN_FUNCTIONAL_ASSESSMENT: 0-10

## 2021-05-28 ASSESSMENT — PAIN SCALES - GENERAL
PAINLEVEL_OUTOF10: 0
PAINLEVEL_OUTOF10: 0

## 2021-05-28 NOTE — PROGRESS NOTES
1990 patient returned to unit. Patient awake and oriented. Denies pain / discomfort. Vitals WNL, water provided per patient request. Patient tolerating well. IV site C/D/I. Belongings returned to patient. No needs expressed at this time. Call light in reach.

## 2021-05-28 NOTE — ANESTHESIA PRE PROCEDURE
Weight: 247 lb (112 kg) 247 lb (112 kg)   Height: 5' 2\" (1.575 m) 5' 2\" (1.575 m)                                              BP Readings from Last 3 Encounters:   05/28/21 139/75   05/21/21 130/80   05/20/21 110/72       NPO Status:                                                                                 BMI:   Wt Readings from Last 3 Encounters:   05/28/21 247 lb (112 kg)   05/21/21 247 lb (112 kg)   05/20/21 247 lb 1.6 oz (112.1 kg)     Body mass index is 45.18 kg/m². CBC:   Lab Results   Component Value Date    WBC 4.5 12/02/2020    RBC 4.84 12/02/2020    HGB 14.4 12/02/2020    HCT 44.8 12/02/2020    MCV 92.6 12/02/2020    RDW 13.1 12/02/2020     12/02/2020       CMP:   Lab Results   Component Value Date     12/02/2020    K 4.7 12/02/2020     12/02/2020    CO2 26 12/02/2020    BUN 10 12/02/2020    CREATININE 0.9 12/02/2020    GFRAA >60 12/02/2020    LABGLOM >60 12/02/2020    GLUCOSE 92 12/02/2020    PROT 6.9 12/02/2020    PROT 7.2 12/25/2010    CALCIUM 9.4 12/02/2020    BILITOT 0.9 12/02/2020    ALKPHOS 72 12/02/2020    AST 15 12/02/2020    ALT 15 12/02/2020       POC Tests: No results for input(s): POCGLU, POCNA, POCK, POCCL, POCBUN, POCHEMO, POCHCT in the last 72 hours.     Coags:   Lab Results   Component Value Date    PROTIME 10.8 12/25/2010    INR 0.99 12/25/2010    APTT 24.7 12/25/2010       HCG (If Applicable):   Lab Results   Component Value Date    PREGTESTUR NEGATIVE 10/06/2014        ABGs: No results found for: PHART, PO2ART, UNP3XMN, DAE3MER, BEART, V1YBYUFU     Type & Screen (If Applicable):  No results found for: LABABO, LABRH    Drug/Infectious Status (If Applicable):  No results found for: HIV, HEPCAB    COVID-19 Screening (If Applicable):   Lab Results   Component Value Date    COVID19 NOT DETECTED 05/21/2021           Anesthesia Evaluation  Patient summary reviewed  Airway:         Dental:          Pulmonary:Negative Pulmonary ROS Cardiovascular:Negative CV ROS             Beta Blocker:  Not on Beta Blocker      ROS comment: Echo 2/2021:  Summary   Left ventricular systolic function is normal with an ejection fraction of   55-60%. Mild concentric left ventricular hypertrophy. Doppler evaluation reveals mild aortic, mitral, and tricuspid   regurgitation. No evidence of pericardial effusion. Stress test 2/2021:  Summary   Overall Impression:   Normal exercise stress test   Good Functional Capacity     Neuro/Psych:   Negative Neuro/Psych ROS              GI/Hepatic/Renal:   (+) morbid obesity          Endo/Other: Negative Endo/Other ROS                    Abdominal:           Vascular: negative vascular ROS. Anesthesia Plan      MAC     ASA 2       Induction: intravenous. UZMA Bell - CRNA   5/28/2021       Pre Anesthesia Assessment complete.  Chart reviewed on 5/28/2021

## 2021-05-28 NOTE — H&P
History and Physical Update    Original H&P done in office on 5/20/21 (less than 30 days ago). Pt reports the following changes in health since being seen last:    None    Vitals:    05/28/21 0650   BP: 139/75   Pulse: 75   Resp: 16   Temp: 97.6 °F (36.4 °C)   SpO2: 99%     Physical Exam  A&Ox3, NAD at rest.  AT. NC. Breathing unlabored. RRR  S, NT, ND, no PS  ALFARO  Warm, dry      48 y/o F here for pre op EGD for planned bariatric surgery    -Consent obtained in office.  -Reviewed expected pre-operative, operative, and post-operative courses. -Answered questions to patient's satisfaction.   -Reviewed risks, benefits, alternative to procedure.   -Proceed as scheduled. -The patient was counseled at length about the risks of laura Covid-19 during their perioperative period and any recovery window from their procedure. The patient was made aware that laura Covid-19  may worsen their prognosis for recovering from their procedure  and lend to a higher morbidity and/or mortality risk. All material risks, benefits, and reasonable alternatives including postponing the procedure were discussed. The patient does wish to proceed with the procedure at this time.         Meng Tilley MD

## 2021-05-28 NOTE — OP NOTE
PROCEDURE NOTE    DATE OF PROCEDURE: 5/28/2021     SURGEON: Shani Lara MD , M.D, FACS    ASSISTANT: None    PREOPERATIVE DIAGNOSIS:  1. Morbid obesity (planned bariatric surgery)    POSTOPERATIVE DIAGNOSIS:  1. Gastritis       2. Small hiatal hernia    OPERATION: Esophagogastroduodenoscopy with biopsies    ANESTHESIA: Local monitored anesthesia    ESTIMATED BLOOD LOSS: None    COMPLICATIONS: None apparent    SPECIMENS: were obtained    HISTORY: The patient is a 49y.o. year old female with history of the above preoperative diagnosis. I recommended esophagogastroduodenoscopy with possible biopsy and I explained the risk, benefits, expected outcome, and alternatives to the procedure. Risks included but are not limited to bleeding, infection, respiratory distress, hypotension, and perforation of the esophagus, stomach, or duodenum. Patient understands and is in agreement, wishing to proceed. PROCEDURE: The patient was brought into the endoscopy suite and the appropriate monitors were connected to the patient. A timeout was held with all members of the procedure team present and in agreement. The patient was positioned in the left lateral decubitus position with a bite block in place. The endoscope was inserted into the patient's mouth and advanced from the oropharynx into the hypopharynx without difficulty and under direct vision. The scope was then advanced through the patient's esophagus under direct vision into the stomach, pylorus, and duodenum. A retroflexed view of the gastroesophageal junction was performed. Biopsies were taken. A summary of the findings are as follows:      Duodenum:     Descending: normal    Bulb: normal    Stomach:    Antrum: abnormal: gastritis, biopsied    Body: normal    Fundus: abnormal: small hiatal hernia    Esophagus: normal, GEJ ~ 40 cm from incisors    Larynx: normal    The stomach was desufflated and the endoscope was removed.  The patient tolerated the

## 2021-05-28 NOTE — PROGRESS NOTES
Patient dressed, denies discomfort. Discharge instructions reviewed with patients spouse. Patient discharging in a private vehicle.

## 2021-05-28 NOTE — ANESTHESIA PRE PROCEDURE
°C (97.6 °F)   TempSrc:  Temporal   SpO2:  99%   Weight: 247 lb (112 kg) 247 lb (112 kg)   Height: 5' 2\" (1.575 m) 5' 2\" (1.575 m)                                              BP Readings from Last 3 Encounters:   05/28/21 139/75   05/21/21 130/80   05/20/21 110/72       NPO Status: Time of last liquid consumption: 2100                        Time of last solid consumption: 2100                        Date of last liquid consumption: 05/27/21                        Date of last solid food consumption: 05/27/21    BMI:   Wt Readings from Last 3 Encounters:   05/28/21 247 lb (112 kg)   05/21/21 247 lb (112 kg)   05/20/21 247 lb 1.6 oz (112.1 kg)     Body mass index is 45.18 kg/m². CBC:   Lab Results   Component Value Date    WBC 4.5 12/02/2020    RBC 4.84 12/02/2020    HGB 14.4 12/02/2020    HCT 44.8 12/02/2020    MCV 92.6 12/02/2020    RDW 13.1 12/02/2020     12/02/2020       CMP:   Lab Results   Component Value Date     12/02/2020    K 4.7 12/02/2020     12/02/2020    CO2 26 12/02/2020    BUN 10 12/02/2020    CREATININE 0.9 12/02/2020    GFRAA >60 12/02/2020    LABGLOM >60 12/02/2020    GLUCOSE 92 12/02/2020    PROT 6.9 12/02/2020    PROT 7.2 12/25/2010    CALCIUM 9.4 12/02/2020    BILITOT 0.9 12/02/2020    ALKPHOS 72 12/02/2020    AST 15 12/02/2020    ALT 15 12/02/2020       POC Tests: No results for input(s): POCGLU, POCNA, POCK, POCCL, POCBUN, POCHEMO, POCHCT in the last 72 hours.     Coags:   Lab Results   Component Value Date    PROTIME 10.8 12/25/2010    INR 0.99 12/25/2010    APTT 24.7 12/25/2010       HCG (If Applicable):   Lab Results   Component Value Date    PREGTESTUR NEGATIVE 10/06/2014        ABGs: No results found for: PHART, PO2ART, HHM1KGI, GLF6UFJ, BEART, Q9ZKMVBZ     Type & Screen (If Applicable):  No results found for: LABABO, LABRH    Drug/Infectious Status (If Applicable):  No results found for: HIV, HEPCAB    COVID-19 Screening (If Applicable):   Lab Results   Component Value Date    COVID19 NOT DETECTED 05/21/2021           Anesthesia Evaluation  Patient summary reviewed no history of anesthetic complications:   Airway: Mallampati: II        Dental: normal exam         Pulmonary:Negative Pulmonary ROS and normal exam                               Cardiovascular:Negative CV ROS  Exercise tolerance: good (>4 METS),            Beta Blocker:  Not on Beta Blocker      ROS comment: Echo 2/2021:  Summary   Left ventricular systolic function is normal with an ejection fraction of   55-60%. Mild concentric left ventricular hypertrophy. Doppler evaluation reveals mild aortic, mitral, and tricuspid   regurgitation. No evidence of pericardial effusion. Stress test 2/2021:  Summary   Overall Impression:   Normal exercise stress test   Good Functional Capacity     Neuro/Psych:   Negative Neuro/Psych ROS              GI/Hepatic/Renal:   (+) morbid obesity          Endo/Other: Negative Endo/Other ROS                    Abdominal:   (+) obese,         Vascular: negative vascular ROS. Anesthesia Plan      MAC     ASA 2       Induction: intravenous. Anesthetic plan and risks discussed with patient. Pre Anesthesia Evaluation complete. Anesthesia plan, risks, benefits, alternatives, and personnel discussed with patient and/or legal guardian. Patient and/or legal guardian verbalized an understanding and agreed to proceed. Anesthesia plan discussed with care team members and agreed upon.   Eddie Martinez, UZMA - CRNA  5/28/2021

## 2021-06-04 ENCOUNTER — TELEPHONE (OUTPATIENT)
Dept: BARIATRICS/WEIGHT MGMT | Age: 51
End: 2021-06-04

## 2021-06-04 NOTE — TELEPHONE ENCOUNTER
Per call to [facility] spoke w/ Amandeep Montero states Caitlyn Goyal denied due to no benefits for bariatric surgery on patient's plan. Advised I was told on 10/28/2020 CUS#[id number] that bariatrics were a covered benefit on patients plan. She was able to locate the call and verified I was misinformed. She has opened up an investigation to find out what they can do because of the misinformation given to me. Email sent to supervisor chrissie allow 30 business days for investigation. [id number]    Called patient to give her information left message.  Please transfer patient to me

## 2021-06-04 NOTE — TELEPHONE ENCOUNTER
Spoke with patient explained all issues and if denial is still an issue will send to lulú to see if they will approved with primary denial

## 2021-07-08 ENCOUNTER — TELEPHONE (OUTPATIENT)
Dept: BARIATRICS/WEIGHT MGMT | Age: 51
End: 2021-07-08

## 2021-07-08 DIAGNOSIS — Z01.818 PRE-OP TESTING: Primary | ICD-10-CM

## 2021-07-13 ENCOUNTER — OFFICE VISIT (OUTPATIENT)
Dept: BARIATRICS/WEIGHT MGMT | Age: 51
End: 2021-07-13
Payer: COMMERCIAL

## 2021-07-13 VITALS
BODY MASS INDEX: 46.21 KG/M2 | OXYGEN SATURATION: 97 % | DIASTOLIC BLOOD PRESSURE: 84 MMHG | HEIGHT: 62 IN | SYSTOLIC BLOOD PRESSURE: 122 MMHG | HEART RATE: 86 BPM | WEIGHT: 251.1 LBS

## 2021-07-13 DIAGNOSIS — E66.01 MORBID OBESITY WITH BMI OF 45.0-49.9, ADULT (HCC): Primary | ICD-10-CM

## 2021-07-13 PROCEDURE — G8417 CALC BMI ABV UP PARAM F/U: HCPCS | Performed by: SURGERY

## 2021-07-13 PROCEDURE — G8427 DOCREV CUR MEDS BY ELIG CLIN: HCPCS | Performed by: SURGERY

## 2021-07-13 PROCEDURE — 99214 OFFICE O/P EST MOD 30 MIN: CPT | Performed by: SURGERY

## 2021-07-13 PROCEDURE — 1036F TOBACCO NON-USER: CPT | Performed by: SURGERY

## 2021-07-13 PROCEDURE — 3017F COLORECTAL CA SCREEN DOC REV: CPT | Performed by: SURGERY

## 2021-07-13 RX ORDER — HEPARIN SODIUM 5000 [USP'U]/ML
5000 INJECTION, SOLUTION INTRAVENOUS; SUBCUTANEOUS ONCE
Status: DISCONTINUED | OUTPATIENT
Start: 2021-07-13 | End: 2021-08-06 | Stop reason: HOSPADM

## 2021-07-13 RX ORDER — ONDANSETRON 2 MG/ML
4 INJECTION INTRAMUSCULAR; INTRAVENOUS ONCE
Status: DISCONTINUED | OUTPATIENT
Start: 2021-07-13 | End: 2021-08-06 | Stop reason: HOSPADM

## 2021-07-13 RX ORDER — SODIUM CHLORIDE 0.9 % (FLUSH) 0.9 %
5-40 SYRINGE (ML) INJECTION EVERY 12 HOURS SCHEDULED
Status: DISCONTINUED | OUTPATIENT
Start: 2021-07-13 | End: 2021-08-06 | Stop reason: HOSPADM

## 2021-07-13 RX ORDER — SODIUM CHLORIDE 0.9 % (FLUSH) 0.9 %
10 SYRINGE (ML) INJECTION PRN
Status: DISCONTINUED | OUTPATIENT
Start: 2021-07-13 | End: 2021-08-06 | Stop reason: HOSPADM

## 2021-07-13 RX ORDER — SODIUM CHLORIDE 9 MG/ML
25 INJECTION, SOLUTION INTRAVENOUS PRN
Status: DISCONTINUED | OUTPATIENT
Start: 2021-07-13 | End: 2021-08-06 | Stop reason: HOSPADM

## 2021-07-13 RX ORDER — SODIUM CHLORIDE, SODIUM LACTATE, POTASSIUM CHLORIDE, CALCIUM CHLORIDE 600; 310; 30; 20 MG/100ML; MG/100ML; MG/100ML; MG/100ML
INJECTION, SOLUTION INTRAVENOUS CONTINUOUS
Status: DISCONTINUED | OUTPATIENT
Start: 2021-07-13 | End: 2021-08-06 | Stop reason: HOSPADM

## 2021-07-13 RX ORDER — PANTOPRAZOLE SODIUM 40 MG/1
40 TABLET, DELAYED RELEASE ORAL
Qty: 28 TABLET | Refills: 0 | Status: ON HOLD | OUTPATIENT
Start: 2021-07-13 | End: 2021-08-06 | Stop reason: HOSPADM

## 2021-07-13 RX ORDER — CLARITHROMYCIN 500 MG/1
500 TABLET, COATED ORAL 2 TIMES DAILY
Qty: 28 TABLET | Refills: 0 | Status: SHIPPED | OUTPATIENT
Start: 2021-07-13 | End: 2021-07-27

## 2021-07-13 RX ORDER — SCOLOPAMINE TRANSDERMAL SYSTEM 1 MG/1
1 PATCH, EXTENDED RELEASE TRANSDERMAL ONCE
Status: DISCONTINUED | OUTPATIENT
Start: 2021-07-13 | End: 2021-08-06 | Stop reason: HOSPADM

## 2021-07-13 RX ORDER — AMOXICILLIN 500 MG/1
1000 CAPSULE ORAL 2 TIMES DAILY
Qty: 56 CAPSULE | Refills: 0 | Status: SHIPPED | OUTPATIENT
Start: 2021-07-13 | End: 2021-07-27

## 2021-07-13 ASSESSMENT — ENCOUNTER SYMPTOMS
GASTROINTESTINAL NEGATIVE: 1
ALLERGIC/IMMUNOLOGIC NEGATIVE: 1
BACK PAIN: 1
RESPIRATORY NEGATIVE: 1
EYES NEGATIVE: 1

## 2021-07-13 NOTE — PROGRESS NOTES
BARIATRIC SURGERY OFFICE PROGRESS NOTE    SUBJECTIVE:    Patient presenting today referred from No primary care provider on file. , for   Chief Complaint   Patient presents with    Weight Management     8th  Visit, Consent Visit   . Vitals:    07/13/21 1358   BP: 122/84   Pulse: 86   SpO2: 97%        BMI: Body mass index is 45.93 kg/m². Weight History: Wt Readings from Last 3 Encounters:   07/13/21 251 lb 1.6 oz (113.9 kg)   05/28/21 247 lb (112 kg)   05/21/21 247 lb (112 kg)        If within 30 days of bariatric surgery date, have you been to the ED: Armen Weiss is a 49 y.o. female presenting in eighth bariatric PRE-OP  visit. Total weight loss/gain: +3.5 lbs since last visit, n/a lbs since surgery, -8.1 lbs since starting program    Thoroughly reviewed the patient's medical history, family history, social history and review of systems with the patient today in the office. Please see medical record for pertinent positives. Changes in health since last visit: Father passed away. Issues with family (mainly brother). Pt tracking calories: Yes. Pt exercising: Yes. Past Medical History:   Diagnosis Date    COVID-19     \"had + covid test 4/2021- only symptom was headache\"- negative covid test 5/21/2021    H/O echocardiogram 02/25/2021    Mild LVH, AR, TR & MR.    History of kidney stones     Wears dentures     full upper plate    Wears glasses     to read        Patient Active Problem List   Diagnosis    Lipoma of right forearm       Past Surgical History:   Procedure Laterality Date    [facility]Y  2015?    [facility]    CYSTOSCOPY  2007?    stone manipulation    DILATION AND CURETTAGE OF UTERUS  1986    HYSTERECTOMY  2007    LITHOTRIPSY      ( not sure if had this done-per pt)    UPPER GASTROINTESTINAL ENDOSCOPY N/A 5/28/2021    EGD BIOPSY performed by Bam Florez MD at [facility] ENDOSCOPY       Current Outpatient Medications   Medication Sig Dispense Refill    clarithromycin (BIAXIN) 500 MG tablet Take 1 tablet by mouth 2 times daily for 14 days 28 tablet 0    amoxicillin (AMOXIL) 500 MG capsule Take 2 capsules by mouth 2 times daily for 14 days 56 capsule 0    pantoprazole (PROTONIX) 40 MG tablet Take 1 tablet by mouth 2 times daily (before meals) for 14 days 28 tablet 0    Multiple Vitamins-Minerals (MULTIVITAMIN WOMEN PO) Take by mouth       Current Facility-Administered Medications   Medication Dose Route Frequency Provider Last Rate Last Admin    sodium chloride flush 0.9 % injection 5-40 mL  5-40 mL Intravenous 2 times per day Ilan Dumont II, MD        sodium chloride flush 0.9 % injection 10 mL  10 mL Intravenous PRN Ilan Dumont II, MD        0.9 % sodium chloride infusion  25 mL Intravenous PRN Ilan Dumont II, MD        lactated ringers infusion   Intravenous Continuous Ilan Dumont II, MD        enoxaparin (LOVENOX) injection 40 mg  40 mg Subcutaneous 2 times per day Ilan Dumont II, MD        ceFAZolin (ANCEF) 2,000 mg in dextrose 5 % 100 mL IVPB  2,000 mg Intravenous Q8H Ilan Dumont II, MD        heparin (porcine) injection 5,000 Units  5,000 Units Subcutaneous Once Ilan Dumont II, MD        [facility] injection 4 mg  4 mg Intravenous Once Ilan Dumont II, MD        scopolamine (TRANSDERM-SCOP) transdermal patch 1 patch  1 patch Transdermal Once Ilan Dumont II, MD            Allergies   Allergen Reactions    Levofloxacin Shortness Of Breath         Review of Systems   Constitutional: Negative. HENT: Negative. Eyes: Negative. Respiratory: Negative. Cardiovascular: Negative. Gastrointestinal: Negative. Endocrine: Negative. Genitourinary: Negative. Musculoskeletal: Positive for arthralgias and back pain. Skin: Negative. Allergic/Immunologic: Negative. Neurological: Negative. Hematological: Negative. Psychiatric/Behavioral: Negative.         OBJECTIVE:    BP 122/84 (Site: Right Upper Arm, Position: Sitting, Cuff Size: Large Adult)   Pulse 86   Ht 5' 2\" (1.575 m)   Wt 251 lb 1.6 oz (113.9 kg)   SpO2 97%   BMI 45.93 kg/m²      Physical Exam  Vitals reviewed. Constitutional:       General: She is not in acute distress. Appearance: She is obese. She is not ill-appearing, toxic-appearing or diaphoretic. HENT:      Head: Normocephalic and atraumatic. Right Ear: External ear normal.      Left Ear: External ear normal.      Nose: Nose normal.   Eyes:      General:         Right eye: No discharge. Left eye: No discharge. Extraocular Movements: Extraocular movements intact. Cardiovascular:      Rate and Rhythm: Normal rate. Pulmonary:      Effort: No respiratory distress. Abdominal:      Palpations: Abdomen is soft. Musculoskeletal:         General: No swelling. Cervical back: Normal range of motion. Skin:     General: Skin is warm. Neurological:      General: No focal deficit present. Mental Status: She is alert. Psychiatric:         Mood and Affect: Mood normal.         ASSESSMENT & PLAN:    1. Morbid obesity with BMI of 45.0-49.9, adult ([facility]  -Consent obtained. Reviewed in detail with the patient and/or family the expected pre-operative, operative, and post-operative courses including risks, benefits, and alternatives to the procedure. The patient's questions were answered in detail and agreed to proceed with the procedure.   -Pre op / PAT orders placed.  -Will need to meet with dietician for pre op diet. -H pylori positive from EGD. Scripts sent.   -The patient was counseled at length about the risks of laura Covid-19 during their perioperative period and any recovery window from their procedure. The patient was made aware that laura Covid-19  may worsen their prognosis for recovering from their procedure  and lend to a higher morbidity and/or mortality risk.   All material risks, benefits, and reasonable alternatives including postponing the procedure were discussed. The patient does wish to proceed with the procedure at this time. Call with any questions, concerns, or issues whatsoever. As of current visit, regarding obesity-related co-morbid conditions:  DELIA [] compliant [] no longer using [] resolved per sleep study; hypertension [] medications; hyperlipidemia [] medications; GERD [] medications; DM [] insulin [] non-insulin [] no meds       Patient was encouraged to journal all food intake. Keep calorie level at approximately 1200, per discussion / plan with registered dietician. Protein intake is to be a minimum of 40-50 grams per day. Water drinking was encouraged with a goal of 64oz-128oz daily. Beverages are to be calorie free except for milk. Avoid soda and other carbonated beverages. Continue to increase level of physical activity. I spent 30 minutes with the patient face to face today and over 50% of the office visit today was spent in face to face counseling regarding diet and exercise, in preparation for her planned robotic sleeve gastrectomy. Discussed in length complying with the dietary recommendations, complying with the preoperative workup including dietary counseling , exercise physiologist counseling , and pre-operative optimization of pulmonologist  and cardiologist .    The patient expressed understanding and willingness to comply nicely; all questions and concerns addressed.     Orders Placed This Encounter   Medications    clarithromycin (BIAXIN) 500 MG tablet     Sig: Take 1 tablet by mouth 2 times daily for 14 days     Dispense:  28 tablet     Refill:  0    amoxicillin (AMOXIL) 500 MG capsule     Sig: Take 2 capsules by mouth 2 times daily for 14 days     Dispense:  56 capsule     Refill:  0    pantoprazole (PROTONIX) 40 MG tablet     Sig: Take 1 tablet by mouth 2 times daily (before meals) for 14 days     Dispense:  28 tablet     Refill:  0    sodium chloride flush 0.9 % injection 5-40 mL    sodium chloride flush 0.9 % injection 10 mL    0.9 % sodium chloride infusion    lactated ringers infusion    enoxaparin (LOVENOX) injection 40 mg    ceFAZolin (ANCEF) 2,000 mg in dextrose 5 % 100 mL IVPB     Order Specific Question:   Antimicrobial Indications     Answer:   Surgical Prophylaxis    heparin (porcine) injection 5,000 Units    ondansetron (ZOFRAN) injection 4 mg    scopolamine (TRANSDERM-SCOP) transdermal patch 1 patch     Orders Placed This Encounter   Procedures    Hemoglobin A1C     Standing Status:   Standing     Number of Occurrences:   1    Pregnancy, Urine     Standing Status:   Standing     Number of Occurrences:   1    CBC Auto Differential     Standing Status:   Standing     Number of Occurrences:   1    Comprehensive Metabolic Panel     Standing Status:   Standing     Number of Occurrences:   1    Urine Drug Screen     Standing Status:   Standing     Number of Occurrences:   1    Diet NPO     Standing Status:   Standing     Number of Occurrences:   1    Vital signs per unit routine     Standing Status:   Standing     Number of Occurrences:   1    Up as tolerated     Standing Status:   Standing     Number of Occurrences:   77761    Full Code     Standing Status:   Standing     Number of Occurrences:   1    Initiate Oxygen Therapy Protocol     - If patient has any of the following conditions, initiate oxygen therapy: SpO2 less than 92%, Cyanosis, Chest Pain, Dyspnea, Home oxygen, or Altered level of consciousness    - If oxygen therapy initiated, enter the RT51 Nasal cannula oxygen order using Per Protocol order mode using the defaulted order parameters and titrate as specified in that order    - If oxygen therapy initiated, notify provider         Standing Status:   Standing     Number of Occurrences:   4       Follow Up:  No follow-ups on file.     Layla Fernández MD

## 2021-07-19 ENCOUNTER — OFFICE VISIT (OUTPATIENT)
Dept: BARIATRICS/WEIGHT MGMT | Age: 51
End: 2021-07-19

## 2021-07-19 VITALS — WEIGHT: 254 LBS | BODY MASS INDEX: 46.74 KG/M2 | HEIGHT: 62 IN

## 2021-07-19 DIAGNOSIS — E66.01 MORBID OBESITY WITH BMI OF 45.0-49.9, ADULT (HCC): Primary | ICD-10-CM

## 2021-07-19 PROCEDURE — 99999 PR OFFICE/OUTPT VISIT,PROCEDURE ONLY: CPT

## 2021-07-19 NOTE — PROGRESS NOTES
Outpatient Nutrition Counseling    REASON FOR VISIT: Pre-Op Diet    Chief Complaint:    Chief Complaint   Patient presents with    Weight Management       SUBJECTIVE:  Pt here to start 2 week liquid liver shrinking diet in preparation for sleeve gastrectomy. Pt instructed on pre-op diet and complete post-op diet progression including tips for N/V, fluid/activity logs, recipes and vitamins. Pt verbalized understanding to al info provided. The patient is a 49 y.o. female being seen for morbid obesity, considering weight loss surgery; Kathya's, Height: 5' 2\" (157.5 cm), Weight: 254 lb (115.2 kg), Current Body mass index is 46.46 kg/m². The patient's PCP is No primary care provider on file. Comorbid Conditions:  Significant diseases affecting this patient are   Past Medical History:   Diagnosis Date    COVID-19     \"had + covid test 4/2021- only symptom was headache\"- negative covid test 5/21/2021    H/O echocardiogram 02/25/2021    Mild LVH, AR, TR & MR.    History of kidney stones     Wears dentures     full upper plate    Wears glasses     to read   . Review of Systems - Review of Systems  Otherwise per HPI. Allergies: Allergies   Allergen Reactions    Levofloxacin Shortness Of Breath       Past Surgical History:  Past Surgical History:   Procedure Laterality Date    [address]Y  2015?    [address]    CYSTOSCOPY  2007?    stone manipulation    DILATION AND CURETTAGE OF UTERUS  1986    HYSTERECTOMY  2007    LITHOTRIPSY      ( not sure if had this done-per pt)    UPPER GASTROINTESTINAL ENDOSCOPY N/A 5/28/2021    EGD BIOPSY performed by Quinton Albrecht MD at [facility] ENDOSCOPY       Family History:  Family History   Problem Relation Age of Onset    Asthma Mother         copd    Heart Disease Mother         chf    Stroke Father        Social History:  Social History     Socioeconomic History    Marital status:      Spouse name: Not on file    Number of children: Not on file    Years of education: Not on file    Highest education level: Not on file   Occupational History    Not on file   Tobacco Use    Smoking status: Never Smoker    Smokeless tobacco: Never Used   Vaping Use    Vaping Use: Never used   Substance and Sexual Activity    Alcohol use: No     Comment: average \"1-2 times per year    Drug use: No    Sexual activity: Yes   Other Topics Concern    Not on file   Social History Narrative    Not on file     Social Determinants of Health     Financial Resource Strain:     Difficulty of Paying Living Expenses:    Food Insecurity:     Worried About Running Out of Food in the Last Year:     Ran Out of Food in the Last Year:    Transportation Needs:     Lack of Transportation (Medical):      Lack of Transportation (Non-Medical):    Physical Activity:     Days of Exercise per Week:     Minutes of Exercise per Session:    Stress:     Feeling of Stress :    Social Connections:     Frequency of Communication with Friends and Family:     Frequency of Social Gatherings with Friends and Family:     Attends Anabaptist Services:     Active Member of Clubs or Organizations:     Attends Club or Organization Meetings:     Marital Status:    Intimate Partner Violence:     Fear of Current or Ex-Partner:     Emotionally Abused:     Physically Abused:     Sexually Abused:          OBJECTIVE:  Physical Exam   Ht 5' 2\" (1.575 m)   Wt 254 lb (115.2 kg)   BMI 46.46 kg/m²        NUTRITION DIAGNOSIS: Overweight / Obesity   Problem: Increased adiposity compared to reference standard or established norms   Etiology: Excess intake compared to output over time   S/S: Ht: 62\" Wt: 254 lbs BMI: 46.46    NUTRITION INTERVENTIONS:    Individualized treatment goals to address nutritiondiagnosis:   Instructed on 600-800 kcal diet for weight loss post-op   Provided fluid/activity logs, recipe book and vitamins handout   Encouraged Physical activity as approved by physician    MONITORING/ EVALUATION/ PLAN:   Pt verbalized understanding of allmaterials covered   Pt asked pertinent questions throughout the session - expect compliance with nutrition guidelines presented   Provided pt with contact information should questions arise prior to next visit   Will f/u with pt post-op  MEKHI Villafuerte MS, RDN, LD  7/19/2021

## 2021-07-21 ENCOUNTER — HOSPITAL ENCOUNTER (OUTPATIENT)
Dept: PREADMISSION TESTING | Age: 51
Discharge: HOME | End: 2021-07-25
Payer: COMMERCIAL

## 2021-07-21 ENCOUNTER — HOSPITAL ENCOUNTER (OUTPATIENT)
Age: 51
Discharge: HOME | End: 2021-07-21
Payer: COMMERCIAL

## 2021-07-21 VITALS
WEIGHT: 253 LBS | BODY MASS INDEX: 47.77 KG/M2 | DIASTOLIC BLOOD PRESSURE: 65 MMHG | HEIGHT: 61 IN | SYSTOLIC BLOOD PRESSURE: 128 MMHG | RESPIRATION RATE: 16 BRPM | HEART RATE: 85 BPM | OXYGEN SATURATION: 97 % | TEMPERATURE: 97.2 F

## 2021-07-21 LAB
ALBUMIN SERPL-MCNC: 4.1 GM/DL (ref 3.4–5)
ALP BLD-CCNC: 70 IU/L (ref 40–128)
ALT SERPL-CCNC: 21 U/L (ref 10–40)
AMPHETAMINES: NEGATIVE
ANION GAP SERPL CALCULATED.3IONS-SCNC: 8 MMOL/L (ref 4–16)
AST SERPL-CCNC: 17 IU/L (ref 15–37)
BARBITURATE SCREEN URINE: NEGATIVE
BASOPHILS ABSOLUTE: 0 K/CU MM
BASOPHILS RELATIVE PERCENT: 0.8 % (ref 0–1)
BENZODIAZEPINE SCREEN, URINE: NEGATIVE
BILIRUB SERPL-MCNC: 1 MG/DL (ref 0–1)
BUN BLDV-MCNC: 15 MG/DL (ref 6–23)
CALCIUM SERPL-MCNC: 9.5 MG/DL (ref 8.3–10.6)
CANNABINOID SCREEN URINE: NEGATIVE
CHLORIDE BLD-SCNC: 104 MMOL/L (ref 99–110)
CO2: 29 MMOL/L (ref 21–32)
COCAINE METABOLITE: NEGATIVE
CREAT SERPL-MCNC: 0.9 MG/DL (ref 0.6–1.1)
DIFFERENTIAL TYPE: ABNORMAL
EOSINOPHILS ABSOLUTE: 0.1 K/CU MM
EOSINOPHILS RELATIVE PERCENT: 2.5 % (ref 0–3)
ESTIMATED AVERAGE GLUCOSE: 103 MG/DL
GFR AFRICAN AMERICAN: >60 ML/MIN/1.73M2
GFR NON-AFRICAN AMERICAN: >60 ML/MIN/1.73M2
GLUCOSE BLD-MCNC: 83 MG/DL (ref 70–99)
HBA1C MFR BLD: 5.2 % (ref 4.2–6.3)
HCT VFR BLD CALC: 44.5 % (ref 37–47)
HEMOGLOBIN: 13.9 GM/DL (ref 12.5–16)
IMMATURE NEUTROPHIL %: 0.3 % (ref 0–0.43)
LYMPHOCYTES ABSOLUTE: 1.2 K/CU MM
LYMPHOCYTES RELATIVE PERCENT: 33.5 % (ref 24–44)
MCH RBC QN AUTO: 28.9 PG (ref 27–31)
MCHC RBC AUTO-ENTMCNC: 31.2 % (ref 32–36)
MCV RBC AUTO: 92.5 FL (ref 78–100)
MONOCYTES ABSOLUTE: 0.3 K/CU MM
MONOCYTES RELATIVE PERCENT: 8.3 % (ref 0–4)
NUCLEATED RBC %: 0 %
OPIATES, URINE: NEGATIVE
OXYCODONE: NEGATIVE
PDW BLD-RTO: 12.8 % (ref 11.7–14.9)
PHENCYCLIDINE, URINE: NEGATIVE
PLATELET # BLD: 190 K/CU MM (ref 140–440)
PMV BLD AUTO: 10.3 FL (ref 7.5–11.1)
POTASSIUM SERPL-SCNC: 4.5 MMOL/L (ref 3.5–5.1)
RBC # BLD: 4.81 M/CU MM (ref 4.2–5.4)
SEGMENTED NEUTROPHILS ABSOLUTE COUNT: 2 K/CU MM
SEGMENTED NEUTROPHILS RELATIVE PERCENT: 54.6 % (ref 36–66)
SODIUM BLD-SCNC: 141 MMOL/L (ref 135–145)
TOTAL IMMATURE NEUTOROPHIL: 0.01 K/CU MM
TOTAL NUCLEATED RBC: 0 K/CU MM
TOTAL PROTEIN: 6.8 GM/DL (ref 6.4–8.2)
WBC # BLD: 3.6 K/CU MM (ref 4–10.5)

## 2021-07-21 PROCEDURE — 85025 COMPLETE CBC W/AUTO DIFF WBC: CPT

## 2021-07-21 PROCEDURE — 36415 COLL VENOUS BLD VENIPUNCTURE: CPT

## 2021-07-21 PROCEDURE — 80053 COMPREHEN METABOLIC PANEL: CPT

## 2021-07-21 PROCEDURE — 80307 DRUG TEST PRSMV CHEM ANLYZR: CPT

## 2021-07-21 PROCEDURE — 83036 HEMOGLOBIN GLYCOSYLATED A1C: CPT

## 2021-07-21 RX ORDER — HEPARIN SODIUM 5000 [USP'U]/ML
5000 INJECTION, SOLUTION INTRAVENOUS; SUBCUTANEOUS ONCE
Status: CANCELLED | OUTPATIENT
Start: 2021-08-04

## 2021-07-21 RX ORDER — ONDANSETRON 2 MG/ML
4 INJECTION INTRAMUSCULAR; INTRAVENOUS ONCE
Status: CANCELLED | OUTPATIENT
Start: 2021-08-04

## 2021-07-21 RX ORDER — SCOLOPAMINE TRANSDERMAL SYSTEM 1 MG/1
1 PATCH, EXTENDED RELEASE TRANSDERMAL ONCE
Status: CANCELLED | OUTPATIENT
Start: 2021-08-04

## 2021-07-21 RX ORDER — SODIUM CHLORIDE, SODIUM LACTATE, POTASSIUM CHLORIDE, CALCIUM CHLORIDE 600; 310; 30; 20 MG/100ML; MG/100ML; MG/100ML; MG/100ML
INJECTION, SOLUTION INTRAVENOUS CONTINUOUS
Status: CANCELLED | OUTPATIENT
Start: 2021-08-04

## 2021-07-29 NOTE — PROGRESS NOTES
Attempted PAT phone assessment. No Answer. left message encouraging pt. To call back to verify no changes to history since PAT visit. PAT number given.

## 2021-07-30 ENCOUNTER — HOSPITAL ENCOUNTER (OUTPATIENT)
Age: 51
Setting detail: SPECIMEN
Discharge: HOME | End: 2021-07-30
Payer: COMMERCIAL

## 2021-07-30 ENCOUNTER — NURSE ONLY (OUTPATIENT)
Dept: SURGERY | Age: 51
End: 2021-07-30
Payer: COMMERCIAL

## 2021-07-30 ENCOUNTER — OFFICE VISIT (OUTPATIENT)
Dept: BARIATRICS/WEIGHT MGMT | Age: 51
End: 2021-07-30
Payer: COMMERCIAL

## 2021-07-30 VITALS
HEART RATE: 80 BPM | DIASTOLIC BLOOD PRESSURE: 80 MMHG | HEIGHT: 62 IN | WEIGHT: 243.5 LBS | BODY MASS INDEX: 44.81 KG/M2 | SYSTOLIC BLOOD PRESSURE: 124 MMHG

## 2021-07-30 VITALS — HEIGHT: 62 IN | WEIGHT: 243.5 LBS | BODY MASS INDEX: 44.81 KG/M2

## 2021-07-30 DIAGNOSIS — Z01.818 PRE-OP TESTING: Primary | ICD-10-CM

## 2021-07-30 DIAGNOSIS — Z01.818 PRE-OP EVALUATION: Primary | ICD-10-CM

## 2021-07-30 PROCEDURE — G8417 CALC BMI ABV UP PARAM F/U: HCPCS | Performed by: NURSE PRACTITIONER

## 2021-07-30 PROCEDURE — 3017F COLORECTAL CA SCREEN DOC REV: CPT | Performed by: NURSE PRACTITIONER

## 2021-07-30 PROCEDURE — 1036F TOBACCO NON-USER: CPT | Performed by: NURSE PRACTITIONER

## 2021-07-30 PROCEDURE — U0003 INFECTIOUS AGENT DETECTION BY NUCLEIC ACID (DNA OR RNA); SEVERE ACUTE RESPIRATORY SYNDROME CORONAVIRUS 2 (SARS-COV-2) (CORONAVIRUS DISEASE [COVID-19]), AMPLIFIED PROBE TECHNIQUE, MAKING USE OF HIGH THROUGHPUT TECHNOLOGIES AS DESCRIBED BY CMS-2020-01-R: HCPCS

## 2021-07-30 PROCEDURE — U0005 INFEC AGEN DETEC AMPLI PROBE: HCPCS

## 2021-07-30 PROCEDURE — G8427 DOCREV CUR MEDS BY ELIG CLIN: HCPCS | Performed by: NURSE PRACTITIONER

## 2021-07-30 PROCEDURE — 99211 OFF/OP EST MAY X REQ PHY/QHP: CPT | Performed by: SURGERY

## 2021-07-30 PROCEDURE — 99213 OFFICE O/P EST LOW 20 MIN: CPT | Performed by: NURSE PRACTITIONER

## 2021-07-30 ASSESSMENT — ENCOUNTER SYMPTOMS
PHOTOPHOBIA: 0
WHEEZING: 0
NAUSEA: 0
SORE THROAT: 0
APNEA: 0
SHORTNESS OF BREATH: 0
COLOR CHANGE: 0
CHEST TIGHTNESS: 0
BACK PAIN: 0
DIARRHEA: 0

## 2021-07-30 NOTE — PROGRESS NOTES
Gina Siegel  1970  49 y.o. HPI Gina Siegel is here for pre op weigh in. lost 10.5 over 2 week slim fast diet and 15.7 overall. Current Body mass index is Body mass index is 44.54 kg/m². Honorio Hilt BMI started at Body mass index is 44.54 kg/m². All labs reviewed and questions answered. Aware to continue diet until surgery and educated on post op diet stages. Dr. Lyla Jewell updated on the above information. SUBJECT SHLOMO:    Chief Complaint   Patient presents with    Weight Management     Weigh in     Past Medical History:   Diagnosis Date    COVID-19     \"had + covid test 4/2021- only symptom was headache\"- negative covid test 5/21/2021    H/O echocardiogram 02/25/2021    Mild LVH, AR, TR & MR.    History of kidney stones     last stone: 2006    Wears dentures     full upper plate    Wears glasses     to read     Past Surgical History:   Procedure Laterality Date    [address]ENDECTOMY  2015?    [address]  2007?    stone manipulation    DILATION AND CURETTAGE OF UTERUS  1986    HYSTERECTOMY  2007    LITHOTRIPSY      ( not sure if had this done-per pt)    UPPER GASTROINTESTINAL ENDOSCOPY N/A 5/28/2021    EGD BIOPSY performed by Marisa [facility]esbitt MD at [facility] ENDOS[facility]     Current Outpatient Medications   Medication Sig Dispense Refill    Multiple Vitamins-Minerals (MULTIVITAMIN WOMEN PO) Take by mouth      pantoprazole (PROTONIX) 40 MG tablet Take 1 tablet by mouth 2 times daily (before meals) for 14 days 28 tablet 0     Current Facility-Administered Medications   Medication Dose Route Frequency Provider Last Rate Last Admin    sodium chloride flush 0.9 % injection 5-40 mL  5-40 mL Intravenous 2 times per day Roberto Lundberg II, MD        sodium chloride flush 0.9 % injection 10 mL  10 mL Intravenous PRN Roberto Lundberg II, MD        0.9 % sodium chloride infusion  25 mL Intravenous PRN Roberto Lundberg II, MD        lactated ringers infusion   Intravenous Continuous Cori Landin Hilary Bonner MD        enoxaparin (LOVENOX) injection 40 mg  40 mg Subcutaneous 2 times per day Odette Del Castillo II, MD        ceFAZolin (ANCEF) 2,000 mg in dextrose 5 % 100 mL IVPB  2,000 mg Intravenous Q8H Odette Del Castillo II, MD        heparin (porcine) injection 5,000 Units  5,000 Units Subcutaneous Once Odette Del Castillo II, MD        ondansetron [facility]) injection 4 mg  4 mg Intravenous Once Beryle Fluke, MD        scopolamine (TRANSDERM-SCOP) transdermal patch 1 patch  1 patch Transdermal Once Beryle Fluke, MD         Family History   Problem Relation Age of Onset    Asthma Mother         copd    Heart Disease Mother         chf    Stroke Father      Allergies   Allergen Reactions    Levofloxacin Shortness Of Breath        Review of Systems   Constitutional: Negative for fatigue and fever. HENT: Negative for congestion, dental problem and sore throat. Eyes: Negative for photophobia and visual disturbance. Respiratory: Negative for apnea, chest tightness, shortness of breath and wheezing. Cardiovascular: Negative for chest pain and leg swelling. Gastrointestinal: Negative for diarrhea and nausea. Endocrine: Negative for cold intolerance and heat intolerance. Genitourinary: Negative for difficulty urinating, dysuria, flank pain, frequency and hematuria. Musculoskeletal: Negative for arthralgias and back pain. Skin: Negative for color change, rash and wound. Allergic/Immunologic: Negative for environmental allergies, food allergies and immunocompromised state. Neurological: Negative for dizziness, weakness, light-headedness and numbness. Hematological: Negative for adenopathy. Does not bruise/bleed easily. Psychiatric/Behavioral: Negative for behavioral problems, confusion, sleep disturbance and suicidal ideas.        OBJECTIVE:     /80 (Site: Right Upper Arm, Position: Sitting, Cuff Size: Large Adult)   Pulse 80   Ht 5' 2\" (1.575 m)   Wt 243 lb 8 oz (110.5 kg) BMI 44.54 kg/m²   Wt Readings from Last 3 Encounters:   07/30/21 243 lb 8 oz (110.5 kg)   07/30/21 243 lb 8 oz (110.5 kg)   07/21/21 253 lb (114.8 kg)       Physical Exam  Vitals reviewed. Constitutional:       Appearance: She is obese. HENT:      Head: Normocephalic and atraumatic. Right Ear: External ear normal.      Left Ear: External ear normal.      Nose: Nose normal.      Mouth/Throat:      Mouth: Mucous membranes are moist.   Eyes:      Extraocular Movements: Extraocular movements intact. Pupils: Pupils are equal, round, and reactive to light. Cardiovascular:      Rate and Rhythm: Normal rate and regular rhythm. Pulses: Normal pulses. Heart sounds: Normal heart sounds. Pulmonary:      Effort: Pulmonary effort is normal.      Breath sounds: Normal breath sounds. Abdominal:      General: Bowel sounds are normal.   Musculoskeletal:         General: Normal range of motion. Cervical back: Normal range of motion and neck supple. Skin:     General: Skin is warm and dry. Neurological:      General: No focal deficit present. Mental Status: She is alert and oriented to person, place, and time. Mental status is at baseline. Psychiatric:         Mood and Affect: Mood normal.         Behavior: Behavior normal.       ASSESSMENT/ PLAN:    1. Pre-op evaluation  - Scheduled for planned sleeve gastrectomy. - Did very well on 2 week Pre op diet, lost -10.5 lbs and -15.7 total.   - BMI 44.54 now and started at 47.41.   - Labs reviewed and all WNL. - Discussed surgery and post op course along with diet stages following surgery.   - MVI prescription given. - Call with any questions or concerns.   - Dr. Angie Miller informed of above information. No orders of the defined types were placed in this encounter. No follow-ups on file.     UZMA Leos - CNP, CNP

## 2021-07-30 NOTE — PATIENT INSTRUCTIONS
Pre-Procedure COVID-19 Self Testing  Quarantine Instructions  Day of Surgery Instructions         What to do before my surgery:    All patients scheduled for elective surgery must test for COVID19 72-96 hours prior to the surgery date.  Pre-Procedure COVID-19 Self-Test will be scheduled for you by your provider.  You can receive your Pre-Procedure COVID-19 Self-Test at:  [facility]. [address], [address]   If you do not have the COVID-19 test we will cancel or reschedule your procedure   Once you test you must quarantine at home until after your procedure with only your immediate family members or whoever lives with you.  If you must work during your quarantine period, we ask that you continue to practice social distancing, wear a mask that covers your mouth and nose and perform all hand hygiene as recommended by the CDC.  If you must go to the grocery, etc. and cannot get someone to do this for you please wear a mask that covers your mouth and nose and perform all hand hygiene as recommended by the CDC.  Your surgeon's office will notify you with any concerns about your test result. What can I expect on the day of surgery?  Arrive at the time the office or hospital staff tell you on the day of your procedure.  Wear a mask when entering the hospital.     A member of the hospital staff will take your temperature and ask you a few questions as you enter the building.  In abundance of caution for the safety of all our patients and staff, please follow all hospital visitor guidelines in place at the time of your procedure. The staff caring for you will stay in close communication with your loved one and keep them updated on progress.  Please provide a phone number for us to use when communicating with your family or ride home.    When you are ready to discharge, we will notify your family/person with you to bring the car to the front entrance. We will take you to them after you receive all of your discharge instructions.

## 2021-07-31 LAB
SARS-COV-2: NOT DETECTED
SOURCE: NORMAL

## 2021-08-03 ENCOUNTER — ANESTHESIA EVENT (OUTPATIENT)
Dept: OPERATING ROOM | Age: 51
DRG: 621 | End: 2021-08-03
Payer: COMMERCIAL

## 2021-08-03 NOTE — ANESTHESIA PRE PROCEDURE
Department of Anesthesiology  Preprocedure Note       Name:  Erika Guzman   Age:  49 y.o.  :  1970                                          MRN:  [id number]         Date:  8/3/2021      Surgeon: Ismael Sands):  MD Pratik Mcdowell II, MD    Procedure: Procedure(s):  GASTRECTOMY SLEEVE LAPAROSCOPIC ROBOTIC  POSS HIATAL HERNIA REPAIR  EGD ESOPHAGOGASTRODUODENOSCOPY    Medications prior to admission:   Prior to Admission medications    Medication Sig Start Date End Date Taking?  Authorizing Provider   pantoprazole (PROTONIX) 40 MG tablet Take 1 tablet by mouth 2 times daily (before meals) for 14 days 21  Pratik Adams II, MD   Multiple Vitamins-Minerals (MULTIVITAMIN WOMEN PO) Take by mouth    Historical Provider, MD       Current medications:    Current Facility-Administered Medications   Medication Dose Route Frequency Provider Last Rate Last Admin    sodium chloride flush 0.9 % injection 5-40 mL  5-40 mL Intravenous 2 times per day Pratik Adams II, MD        sodium chloride flush 0.9 % injection 10 mL  10 mL Intravenous PRN Pratik Adams II, MD        0.9 % sodium chloride infusion  25 mL Intravenous PRN Pratik Adams II, MD        lactated ringers infusion   Intravenous Continuous Pratik Adams II, MD        enoxaparin (LOVENOX) injection 40 mg  40 mg Subcutaneous 2 times per day Pratik Adams II, MD        ceFAZolin (ANCEF) 2,000 mg in dextrose 5 % 100 mL IVPB  2,000 mg Intravenous Q8H Pratik Adams II, MD        heparin (porcine) injection 5,000 Units  5,000 Units Subcutaneous Once Pratik Adams II, MD        ondansetron [facility]) injection 4 mg  4 mg Intravenous Once Pratik Adams II, MD        scopolamine (TRANSDERM-SCOP) transdermal patch 1 patch  1 patch Transdermal Once Pratik Adams II, MD         Current Outpatient Medications   Medication Sig Dispense Refill    pantoprazole (PROTONIX) 40 MG tablet Take 1 tablet by mouth 2 times daily (before meals) for 14 days 28 tablet 0    Multiple Vitamins-Minerals (MULTIVITAMIN WOMEN PO) Take by mouth         Allergies: Allergies   Allergen Reactions    Levofloxacin Shortness Of Breath       Problem List:    Patient Active Problem List   Diagnosis Code    Lipoma of right forearm D17.21       Past Medical History:        Diagnosis Date    COVID-19     \"had + covid test 4/2021- only symptom was headache\"- negative covid test 5/21/2021    H/O echocardiogram 02/25/2021    Mild LVH, AR, TR & MR.    History of kidney stones     last stone: 2006    Wears dentures     full upper plate    Wears glasses     to read       Past Surgical History:        Procedure Laterality Date    APPENDECTOMY  2015? [place]    CYSTOSCOPY  2007?    stone manipulation   [address]    HYSTERECTOMY  2007    LITHOTRIPSY      ( not sure if had this done-per pt)    UPPER GASTROINTESTINAL ENDOSCOPY N/A 5/28/2021    EGD BIOPSY performed by Marisa Nesbitt MD at [facility] END[facility]COPY       Social History:    Social History     Tobacco Use    Smoking status: Never Smoker    Smokeless tobacco: Never Used   Substance Use Topics    Alcohol use: No     Comment: average \"1-2 times per year                                Counseling given: Not Answered      Vital Signs (Current): There were no vitals filed for this visit.                                            BP Readings from Last 3 Encounters:   07/30/21 124/80   07/21/21 128/65   07/13/21 122/84       NPO Status:                                                                                 BMI:   Wt Readings from Last 3 Encounters:   07/30/21 243 lb 8 oz (110.5 kg)   07/30/21 243 lb 8 oz (110.5 kg)   07/21/21 253 lb (114.8 kg)     There is no height or weight on file to calculate BMI.    CBC:   Lab Results   Component Value Date    WBC 3.6 07/21/2021    RBC 4.81 07/21/2021    HGB 13.9 07/21/2021    HCT 44.5 07/21/2021    MCV 92.5 07/21/2021 RDW 12.8 07/21/2021     07/21/2021       CMP:   Lab Results   Component Value Date     07/21/2021    K 4.5 07/21/2021     07/21/2021    CO2 29 07/21/2021    BUN 15 07/21/2021    CREATININE 0.9 07/21/2021    GFRAA >60 07/21/2021    LABGLOM >60 07/21/2021    GLUCOSE 83 07/21/2021    PROT 6.8 07/21/2021    PROT 7.2 12/25/2010    CALCIUM 9.5 07/21/2021    BILITOT 1.0 07/21/2021    ALKPHOS 70 07/21/2021    AST 17 07/21/2021    ALT 21 07/21/2021       POC Tests: No results for input(s): POCGLU, POCNA, POCK, POCCL, POCBUN, POCHEMO, POCHCT in the last 72 hours. Coags:   Lab Results   Component Value Date    PROTIME 10.8 12/25/2010    INR 0.99 12/25/2010    APTT 24.7 12/25/2010       HCG (If Applicable):   Lab Results   Component Value Date    PREGTESTUR NEGATIVE 10/06/2014        ABGs: No results found for: PHART, PO2ART, VTP5UVC, KXI3BNT, BEART, X8JIGTMB     Type & Screen (If Applicable):  No results found for: LABABO, LABRH    Drug/Infectious Status (If Applicable):  No results found for: HIV, HEPCAB    COVID-19 Screening (If Applicable):   Lab Results   Component Value Date    COVID19 NOT DETECTED 07/30/2021           Anesthesia Evaluation    Airway: Mallampati: II  TM distance: >3 FB   Neck ROM: full  Mouth opening: > = 3 FB Dental:    (+) upper dentures and lower dentures      Pulmonary:Negative Pulmonary ROS and normal exam                               Cardiovascular:Negative CV ROS  Exercise tolerance: good (>4 METS),           Rhythm: regular  Rate: normal  Echocardiogram reviewed                  Neuro/Psych:   Negative Neuro/Psych ROS              GI/Hepatic/Renal: Neg GI/Hepatic/Renal ROS            Endo/Other: Negative Endo/Other ROS                    Abdominal:             Vascular: negative vascular ROS. Other Findings:           Anesthesia Plan      general     ASA 3       Induction: intravenous.     MIPS: Postoperative opioids intended and Prophylactic antiemetics administered. Anesthetic plan and risks discussed with patient. Plan discussed with CRNA.                 UZMA Nichols - CRNA   8/3/2021

## 2021-08-04 ENCOUNTER — HOSPITAL ENCOUNTER (INPATIENT)
Age: 51
LOS: 2 days | Discharge: HOME | DRG: 621 | End: 2021-08-06
Attending: SURGERY | Admitting: SURGERY
Payer: COMMERCIAL

## 2021-08-04 ENCOUNTER — ANESTHESIA (OUTPATIENT)
Dept: OPERATING ROOM | Age: 51
DRG: 621 | End: 2021-08-04
Payer: COMMERCIAL

## 2021-08-04 VITALS
SYSTOLIC BLOOD PRESSURE: 117 MMHG | OXYGEN SATURATION: 94 % | RESPIRATION RATE: 1 BRPM | TEMPERATURE: 97.7 F | DIASTOLIC BLOOD PRESSURE: 100 MMHG

## 2021-08-04 DIAGNOSIS — E66.01 MORBID OBESITY (HCC): Primary | ICD-10-CM

## 2021-08-04 LAB — TROPONIN T: <0.01 NG/ML

## 2021-08-04 PROCEDURE — 0DJ08ZZ INSPECTION OF UPPER INTESTINAL TRACT, VIA NATURAL OR ARTIFICIAL OPENING ENDOSCOPIC: ICD-10-PCS | Performed by: SURGERY

## 2021-08-04 PROCEDURE — S2900 ROBOTIC SURGICAL SYSTEM: HCPCS | Performed by: SURGERY

## 2021-08-04 PROCEDURE — 7100000000 HC PACU RECOVERY - FIRST 15 MIN: Performed by: SURGERY

## 2021-08-04 PROCEDURE — 2720000010 HC SURG SUPPLY STERILE: Performed by: SURGERY

## 2021-08-04 PROCEDURE — 6370000000 HC RX 637 (ALT 250 FOR IP): Performed by: SURGERY

## 2021-08-04 PROCEDURE — 6360000002 HC RX W HCPCS: Performed by: NURSE ANESTHETIST, CERTIFIED REGISTERED

## 2021-08-04 PROCEDURE — 93005 ELECTROCARDIOGRAM TRACING: CPT | Performed by: STUDENT IN AN ORGANIZED HEALTH CARE EDUCATION/TRAINING PROGRAM

## 2021-08-04 PROCEDURE — 2709999900 HC NON-CHARGEABLE SUPPLY: Performed by: SURGERY

## 2021-08-04 PROCEDURE — 84484 ASSAY OF TROPONIN QUANT: CPT

## 2021-08-04 PROCEDURE — 6360000002 HC RX W HCPCS: Performed by: ANESTHESIOLOGY

## 2021-08-04 PROCEDURE — 36415 COLL VENOUS BLD VENIPUNCTURE: CPT

## 2021-08-04 PROCEDURE — 2580000003 HC RX 258: Performed by: SURGERY

## 2021-08-04 PROCEDURE — C1889 IMPLANT/INSERT DEVICE, NOC: HCPCS | Performed by: SURGERY

## 2021-08-04 PROCEDURE — 88307 TISSUE EXAM BY PATHOLOGIST: CPT

## 2021-08-04 PROCEDURE — 6360000002 HC RX W HCPCS: Performed by: SURGERY

## 2021-08-04 PROCEDURE — APPNB180 APP NON BILLABLE TIME > 60 MINS: Performed by: NURSE PRACTITIONER

## 2021-08-04 PROCEDURE — 2500000003 HC RX 250 WO HCPCS: Performed by: SURGERY

## 2021-08-04 PROCEDURE — 7100000001 HC PACU RECOVERY - ADDTL 15 MIN: Performed by: SURGERY

## 2021-08-04 PROCEDURE — 3600000009 HC SURGERY ROBOT BASE: Performed by: SURGERY

## 2021-08-04 PROCEDURE — 3600000019 HC SURGERY ROBOT ADDTL 15MIN: Performed by: SURGERY

## 2021-08-04 PROCEDURE — 0DB60Z3 EXCISION OF STOMACH, OPEN APPROACH, VERTICAL: ICD-10-PCS | Performed by: SURGERY

## 2021-08-04 PROCEDURE — 88342 IMHCHEM/IMCYTCHM 1ST ANTB: CPT

## 2021-08-04 PROCEDURE — 43775 LAP SLEEVE GASTRECTOMY: CPT | Performed by: NURSE PRACTITIONER

## 2021-08-04 PROCEDURE — 2500000003 HC RX 250 WO HCPCS: Performed by: NURSE ANESTHETIST, CERTIFIED REGISTERED

## 2021-08-04 PROCEDURE — 43775 LAP SLEEVE GASTRECTOMY: CPT | Performed by: SURGERY

## 2021-08-04 PROCEDURE — 3700000000 HC ANESTHESIA ATTENDED CARE: Performed by: SURGERY

## 2021-08-04 PROCEDURE — 3700000001 HC ADD 15 MINUTES (ANESTHESIA): Performed by: SURGERY

## 2021-08-04 PROCEDURE — 1200000000 HC SEMI PRIVATE

## 2021-08-04 RX ORDER — SODIUM CHLORIDE, SODIUM LACTATE, POTASSIUM CHLORIDE, CALCIUM CHLORIDE 600; 310; 30; 20 MG/100ML; MG/100ML; MG/100ML; MG/100ML
INJECTION, SOLUTION INTRAVENOUS CONTINUOUS
Status: DISCONTINUED | OUTPATIENT
Start: 2021-08-04 | End: 2021-08-04

## 2021-08-04 RX ORDER — MORPHINE SULFATE 2 MG/ML
2 INJECTION, SOLUTION INTRAMUSCULAR; INTRAVENOUS EVERY 5 MIN PRN
Status: DISCONTINUED | OUTPATIENT
Start: 2021-08-04 | End: 2021-08-04 | Stop reason: HOSPADM

## 2021-08-04 RX ORDER — LIDOCAINE HYDROCHLORIDE 20 MG/ML
INJECTION, SOLUTION EPIDURAL; INFILTRATION; INTRACAUDAL; PERINEURAL PRN
Status: DISCONTINUED | OUTPATIENT
Start: 2021-08-04 | End: 2021-08-04 | Stop reason: SDUPTHER

## 2021-08-04 RX ORDER — SODIUM CHLORIDE 0.9 % (FLUSH) 0.9 %
5-40 SYRINGE (ML) INJECTION EVERY 12 HOURS SCHEDULED
Status: DISCONTINUED | OUTPATIENT
Start: 2021-08-04 | End: 2021-08-06 | Stop reason: HOSPADM

## 2021-08-04 RX ORDER — ONDANSETRON 2 MG/ML
INJECTION INTRAMUSCULAR; INTRAVENOUS PRN
Status: DISCONTINUED | OUTPATIENT
Start: 2021-08-04 | End: 2021-08-04 | Stop reason: SDUPTHER

## 2021-08-04 RX ORDER — LABETALOL HYDROCHLORIDE 5 MG/ML
5 INJECTION, SOLUTION INTRAVENOUS EVERY 10 MIN PRN
Status: DISCONTINUED | OUTPATIENT
Start: 2021-08-04 | End: 2021-08-04 | Stop reason: HOSPADM

## 2021-08-04 RX ORDER — HYDROMORPHONE HCL 110MG/55ML
0.25 PATIENT CONTROLLED ANALGESIA SYRINGE INTRAVENOUS EVERY 5 MIN PRN
Status: DISCONTINUED | OUTPATIENT
Start: 2021-08-04 | End: 2021-08-04 | Stop reason: HOSPADM

## 2021-08-04 RX ORDER — ONDANSETRON 2 MG/ML
4 INJECTION INTRAMUSCULAR; INTRAVENOUS EVERY 6 HOURS PRN
Status: DISCONTINUED | OUTPATIENT
Start: 2021-08-04 | End: 2021-08-06 | Stop reason: HOSPADM

## 2021-08-04 RX ORDER — HYDROMORPHONE HCL 110MG/55ML
PATIENT CONTROLLED ANALGESIA SYRINGE INTRAVENOUS PRN
Status: DISCONTINUED | OUTPATIENT
Start: 2021-08-04 | End: 2021-08-04 | Stop reason: SDUPTHER

## 2021-08-04 RX ORDER — MIDAZOLAM HYDROCHLORIDE 1 MG/ML
INJECTION INTRAMUSCULAR; INTRAVENOUS PRN
Status: DISCONTINUED | OUTPATIENT
Start: 2021-08-04 | End: 2021-08-04 | Stop reason: SDUPTHER

## 2021-08-04 RX ORDER — HEPARIN SODIUM 5000 [USP'U]/ML
5000 INJECTION, SOLUTION INTRAVENOUS; SUBCUTANEOUS ONCE
Status: COMPLETED | OUTPATIENT
Start: 2021-08-04 | End: 2021-08-04

## 2021-08-04 RX ORDER — POTASSIUM CHLORIDE 7.45 MG/ML
10 INJECTION INTRAVENOUS PRN
Status: DISCONTINUED | OUTPATIENT
Start: 2021-08-04 | End: 2021-08-06 | Stop reason: HOSPADM

## 2021-08-04 RX ORDER — PROPOFOL 10 MG/ML
INJECTION, EMULSION INTRAVENOUS PRN
Status: DISCONTINUED | OUTPATIENT
Start: 2021-08-04 | End: 2021-08-04 | Stop reason: SDUPTHER

## 2021-08-04 RX ORDER — SODIUM CHLORIDE 0.9 % (FLUSH) 0.9 %
10 SYRINGE (ML) INJECTION PRN
Status: DISCONTINUED | OUTPATIENT
Start: 2021-08-04 | End: 2021-08-06 | Stop reason: HOSPADM

## 2021-08-04 RX ORDER — FENTANYL CITRATE 50 UG/ML
25 INJECTION, SOLUTION INTRAMUSCULAR; INTRAVENOUS EVERY 5 MIN PRN
Status: DISCONTINUED | OUTPATIENT
Start: 2021-08-04 | End: 2021-08-04 | Stop reason: HOSPADM

## 2021-08-04 RX ORDER — ONDANSETRON 2 MG/ML
4 INJECTION INTRAMUSCULAR; INTRAVENOUS ONCE
Status: COMPLETED | OUTPATIENT
Start: 2021-08-04 | End: 2021-08-04

## 2021-08-04 RX ORDER — PROMETHAZINE HYDROCHLORIDE 25 MG/ML
6.25 INJECTION, SOLUTION INTRAMUSCULAR; INTRAVENOUS
Status: COMPLETED | OUTPATIENT
Start: 2021-08-04 | End: 2021-08-04

## 2021-08-04 RX ORDER — SODIUM CHLORIDE 9 MG/ML
25 INJECTION, SOLUTION INTRAVENOUS PRN
Status: DISCONTINUED | OUTPATIENT
Start: 2021-08-04 | End: 2021-08-06 | Stop reason: HOSPADM

## 2021-08-04 RX ORDER — HYDROMORPHONE HCL 110MG/55ML
0.5 PATIENT CONTROLLED ANALGESIA SYRINGE INTRAVENOUS EVERY 5 MIN PRN
Status: DISCONTINUED | OUTPATIENT
Start: 2021-08-04 | End: 2021-08-04 | Stop reason: HOSPADM

## 2021-08-04 RX ORDER — HYDROCODONE BITARTRATE AND ACETAMINOPHEN 5; 325 MG/1; MG/1
1 TABLET ORAL EVERY 6 HOURS PRN
Status: DISCONTINUED | OUTPATIENT
Start: 2021-08-04 | End: 2021-08-06 | Stop reason: HOSPADM

## 2021-08-04 RX ORDER — ROCURONIUM BROMIDE 10 MG/ML
INJECTION, SOLUTION INTRAVENOUS PRN
Status: DISCONTINUED | OUTPATIENT
Start: 2021-08-04 | End: 2021-08-04 | Stop reason: SDUPTHER

## 2021-08-04 RX ORDER — MORPHINE SULFATE 4 MG/ML
4 INJECTION, SOLUTION INTRAMUSCULAR; INTRAVENOUS
Status: DISCONTINUED | OUTPATIENT
Start: 2021-08-04 | End: 2021-08-06 | Stop reason: HOSPADM

## 2021-08-04 RX ORDER — POTASSIUM CHLORIDE 20 MEQ/1
40 TABLET, EXTENDED RELEASE ORAL PRN
Status: DISCONTINUED | OUTPATIENT
Start: 2021-08-04 | End: 2021-08-06 | Stop reason: HOSPADM

## 2021-08-04 RX ORDER — SCOLOPAMINE TRANSDERMAL SYSTEM 1 MG/1
1 PATCH, EXTENDED RELEASE TRANSDERMAL ONCE
Status: DISCONTINUED | OUTPATIENT
Start: 2021-08-04 | End: 2021-08-04

## 2021-08-04 RX ORDER — BUPIVACAINE HYDROCHLORIDE 5 MG/ML
INJECTION, SOLUTION EPIDURAL; INTRACAUDAL
Status: COMPLETED | OUTPATIENT
Start: 2021-08-04 | End: 2021-08-04

## 2021-08-04 RX ORDER — CEFAZOLIN SODIUM 2 G/50ML
2000 SOLUTION INTRAVENOUS EVERY 8 HOURS
Status: COMPLETED | OUTPATIENT
Start: 2021-08-04 | End: 2021-08-05

## 2021-08-04 RX ORDER — MORPHINE SULFATE 2 MG/ML
2 INJECTION, SOLUTION INTRAMUSCULAR; INTRAVENOUS
Status: DISCONTINUED | OUTPATIENT
Start: 2021-08-04 | End: 2021-08-06 | Stop reason: HOSPADM

## 2021-08-04 RX ORDER — METOCLOPRAMIDE HYDROCHLORIDE 5 MG/ML
10 INJECTION INTRAMUSCULAR; INTRAVENOUS EVERY 6 HOURS PRN
Status: DISCONTINUED | OUTPATIENT
Start: 2021-08-04 | End: 2021-08-06 | Stop reason: HOSPADM

## 2021-08-04 RX ORDER — HYDRALAZINE HYDROCHLORIDE 20 MG/ML
5 INJECTION INTRAMUSCULAR; INTRAVENOUS EVERY 10 MIN PRN
Status: DISCONTINUED | OUTPATIENT
Start: 2021-08-04 | End: 2021-08-04 | Stop reason: HOSPADM

## 2021-08-04 RX ORDER — DEXAMETHASONE SODIUM PHOSPHATE 4 MG/ML
INJECTION, SOLUTION INTRA-ARTICULAR; INTRALESIONAL; INTRAMUSCULAR; INTRAVENOUS; SOFT TISSUE PRN
Status: DISCONTINUED | OUTPATIENT
Start: 2021-08-04 | End: 2021-08-04 | Stop reason: SDUPTHER

## 2021-08-04 RX ORDER — SODIUM CHLORIDE, SODIUM LACTATE, POTASSIUM CHLORIDE, CALCIUM CHLORIDE 600; 310; 30; 20 MG/100ML; MG/100ML; MG/100ML; MG/100ML
INJECTION, SOLUTION INTRAVENOUS CONTINUOUS
Status: DISCONTINUED | OUTPATIENT
Start: 2021-08-04 | End: 2021-08-06 | Stop reason: HOSPADM

## 2021-08-04 RX ORDER — KETAMINE HCL 50MG/ML(1)
SYRINGE (ML) INTRAVENOUS PRN
Status: DISCONTINUED | OUTPATIENT
Start: 2021-08-04 | End: 2021-08-04 | Stop reason: SDUPTHER

## 2021-08-04 RX ADMIN — SODIUM CHLORIDE, POTASSIUM CHLORIDE, SODIUM LACTATE AND CALCIUM CHLORIDE: 600; 310; 30; 20 INJECTION, SOLUTION INTRAVENOUS at 12:45

## 2021-08-04 RX ADMIN — ONDANSETRON 4 MG: 2 INJECTION INTRAMUSCULAR; INTRAVENOUS at 19:55

## 2021-08-04 RX ADMIN — HEPARIN SODIUM 5000 UNITS: 5000 INJECTION INTRAVENOUS; SUBCUTANEOUS at 08:54

## 2021-08-04 RX ADMIN — SODIUM CHLORIDE, POTASSIUM CHLORIDE, SODIUM LACTATE AND CALCIUM CHLORIDE: 600; 310; 30; 20 INJECTION, SOLUTION INTRAVENOUS at 16:03

## 2021-08-04 RX ADMIN — FENTANYL CITRATE 25 MCG: 50 INJECTION INTRAMUSCULAR; INTRAVENOUS at 15:58

## 2021-08-04 RX ADMIN — ONDANSETRON 4 MG: 2 INJECTION INTRAMUSCULAR; INTRAVENOUS at 08:54

## 2021-08-04 RX ADMIN — SUGAMMADEX 200 MG: 100 INJECTION, SOLUTION INTRAVENOUS at 12:17

## 2021-08-04 RX ADMIN — SODIUM CHLORIDE, POTASSIUM CHLORIDE, SODIUM LACTATE AND CALCIUM CHLORIDE: 600; 310; 30; 20 INJECTION, SOLUTION INTRAVENOUS at 11:20

## 2021-08-04 RX ADMIN — CEFAZOLIN SODIUM 3000 MG: 1 INJECTION, POWDER, FOR SOLUTION INTRAMUSCULAR; INTRAVENOUS at 10:18

## 2021-08-04 RX ADMIN — HYDROMORPHONE HYDROCHLORIDE 1 MG: 2 INJECTION INTRAMUSCULAR; INTRAVENOUS; SUBCUTANEOUS at 11:24

## 2021-08-04 RX ADMIN — DEXAMETHASONE SODIUM PHOSPHATE 4 MG: 4 INJECTION, SOLUTION INTRAMUSCULAR; INTRAVENOUS at 10:11

## 2021-08-04 RX ADMIN — PROPOFOL 200 MG: 10 INJECTION, EMULSION INTRAVENOUS at 10:11

## 2021-08-04 RX ADMIN — HYDROMORPHONE HYDROCHLORIDE 0.5 MG: 2 INJECTION INTRAMUSCULAR; INTRAVENOUS; SUBCUTANEOUS at 13:01

## 2021-08-04 RX ADMIN — Medication 50 MG: at 10:11

## 2021-08-04 RX ADMIN — ROCURONIUM BROMIDE 10 MG: 10 INJECTION INTRAVENOUS at 11:50

## 2021-08-04 RX ADMIN — CHLORHEXIDINE GLUCONATE: 4 LIQUID TOPICAL at 08:59

## 2021-08-04 RX ADMIN — MIDAZOLAM 2 MG: 1 INJECTION INTRAMUSCULAR; INTRAVENOUS at 10:11

## 2021-08-04 RX ADMIN — ONDANSETRON 4 MG: 2 INJECTION INTRAMUSCULAR; INTRAVENOUS at 12:05

## 2021-08-04 RX ADMIN — SODIUM CHLORIDE, POTASSIUM CHLORIDE, SODIUM LACTATE AND CALCIUM CHLORIDE: 600; 310; 30; 20 INJECTION, SOLUTION INTRAVENOUS at 08:54

## 2021-08-04 RX ADMIN — HYDROMORPHONE HYDROCHLORIDE 0.5 MG: 2 INJECTION INTRAMUSCULAR; INTRAVENOUS; SUBCUTANEOUS at 13:30

## 2021-08-04 RX ADMIN — CEFAZOLIN SODIUM 2000 MG: 2 SOLUTION INTRAVENOUS at 19:54

## 2021-08-04 RX ADMIN — PROMETHAZINE HYDROCHLORIDE 6.25 MG: 25 INJECTION INTRAMUSCULAR; INTRAVENOUS at 16:00

## 2021-08-04 RX ADMIN — ROCURONIUM BROMIDE 50 MG: 10 INJECTION INTRAVENOUS at 10:11

## 2021-08-04 RX ADMIN — ROCURONIUM BROMIDE 10 MG: 10 INJECTION INTRAVENOUS at 10:41

## 2021-08-04 RX ADMIN — ROCURONIUM BROMIDE 20 MG: 10 INJECTION INTRAVENOUS at 11:17

## 2021-08-04 RX ADMIN — MORPHINE SULFATE 4 MG: 4 INJECTION, SOLUTION INTRAMUSCULAR; INTRAVENOUS at 19:55

## 2021-08-04 RX ADMIN — HYDROMORPHONE HYDROCHLORIDE 1 MG: 2 INJECTION INTRAMUSCULAR; INTRAVENOUS; SUBCUTANEOUS at 12:10

## 2021-08-04 RX ADMIN — LIDOCAINE HYDROCHLORIDE 50 MG: 20 INJECTION, SOLUTION EPIDURAL; INFILTRATION; INTRACAUDAL; PERINEURAL at 10:11

## 2021-08-04 RX ADMIN — SODIUM CHLORIDE, POTASSIUM CHLORIDE, SODIUM LACTATE AND CALCIUM CHLORIDE: 600; 310; 30; 20 INJECTION, SOLUTION INTRAVENOUS at 23:50

## 2021-08-04 ASSESSMENT — PULMONARY FUNCTION TESTS
PIF_VALUE: 0
PIF_VALUE: 36
PIF_VALUE: 30
PIF_VALUE: 34
PIF_VALUE: 30
PIF_VALUE: 35
PIF_VALUE: 9
PIF_VALUE: 34
PIF_VALUE: 2
PIF_VALUE: 34
PIF_VALUE: 34
PIF_VALUE: 36
PIF_VALUE: 36
PIF_VALUE: 34
PIF_VALUE: 34
PIF_VALUE: 29
PIF_VALUE: 37
PIF_VALUE: 36
PIF_VALUE: 38
PIF_VALUE: 1
PIF_VALUE: 36
PIF_VALUE: 34
PIF_VALUE: 39
PIF_VALUE: 27
PIF_VALUE: 33
PIF_VALUE: 35
PIF_VALUE: 35
PIF_VALUE: 36
PIF_VALUE: 41
PIF_VALUE: 30
PIF_VALUE: 35
PIF_VALUE: 39
PIF_VALUE: 0
PIF_VALUE: 40
PIF_VALUE: 35
PIF_VALUE: 36
PIF_VALUE: 34
PIF_VALUE: 36
PIF_VALUE: 1
PIF_VALUE: 36
PIF_VALUE: 36
PIF_VALUE: 35
PIF_VALUE: 27
PIF_VALUE: 36
PIF_VALUE: 34
PIF_VALUE: 47
PIF_VALUE: 34
PIF_VALUE: 32
PIF_VALUE: 33
PIF_VALUE: 1
PIF_VALUE: 40
PIF_VALUE: 35
PIF_VALUE: 35
PIF_VALUE: 39
PIF_VALUE: 34
PIF_VALUE: 26
PIF_VALUE: 36
PIF_VALUE: 35
PIF_VALUE: 29
PIF_VALUE: 36
PIF_VALUE: 40
PIF_VALUE: 36
PIF_VALUE: 40
PIF_VALUE: 35
PIF_VALUE: 25
PIF_VALUE: 36
PIF_VALUE: 35
PIF_VALUE: 34
PIF_VALUE: 34
PIF_VALUE: 36
PIF_VALUE: 34
PIF_VALUE: 36
PIF_VALUE: 34
PIF_VALUE: 29
PIF_VALUE: 34
PIF_VALUE: 36
PIF_VALUE: 26
PIF_VALUE: 1
PIF_VALUE: 36
PIF_VALUE: 32
PIF_VALUE: 36
PIF_VALUE: 34
PIF_VALUE: 38
PIF_VALUE: 33
PIF_VALUE: 31
PIF_VALUE: 34
PIF_VALUE: 33
PIF_VALUE: 34
PIF_VALUE: 36
PIF_VALUE: 34
PIF_VALUE: 26
PIF_VALUE: 26
PIF_VALUE: 36
PIF_VALUE: 28
PIF_VALUE: 36
PIF_VALUE: 39
PIF_VALUE: 37
PIF_VALUE: 36
PIF_VALUE: 35
PIF_VALUE: 34
PIF_VALUE: 34
PIF_VALUE: 36
PIF_VALUE: 34
PIF_VALUE: 34
PIF_VALUE: 18
PIF_VALUE: 34
PIF_VALUE: 35
PIF_VALUE: 38
PIF_VALUE: 2
PIF_VALUE: 36
PIF_VALUE: 1
PIF_VALUE: 20
PIF_VALUE: 27
PIF_VALUE: 34
PIF_VALUE: 33
PIF_VALUE: 38
PIF_VALUE: 34
PIF_VALUE: 27
PIF_VALUE: 31
PIF_VALUE: 36
PIF_VALUE: 36
PIF_VALUE: 34
PIF_VALUE: 39
PIF_VALUE: 36
PIF_VALUE: 34
PIF_VALUE: 35
PIF_VALUE: 36
PIF_VALUE: 32
PIF_VALUE: 37
PIF_VALUE: 30

## 2021-08-04 ASSESSMENT — PAIN SCALES - GENERAL
PAINLEVEL_OUTOF10: 7
PAINLEVEL_OUTOF10: 7
PAINLEVEL_OUTOF10: 0
PAINLEVEL_OUTOF10: 4
PAINLEVEL_OUTOF10: 2
PAINLEVEL_OUTOF10: 7
PAINLEVEL_OUTOF10: 6

## 2021-08-04 ASSESSMENT — ENCOUNTER SYMPTOMS
CHEST TIGHTNESS: 0
NAUSEA: 1
DIARRHEA: 0
BLOOD IN STOOL: 0
SHORTNESS OF BREATH: 0
COUGH: 0
ABDOMINAL PAIN: 1
ANAL BLEEDING: 0
WHEEZING: 0
BACK PAIN: 0
CONSTIPATION: 0
SORE THROAT: 0
COLOR CHANGE: 0
RHINORRHEA: 0
VOMITING: 1

## 2021-08-04 ASSESSMENT — PAIN DESCRIPTION - DESCRIPTORS
DESCRIPTORS: PRESSURE
DESCRIPTORS: DISCOMFORT

## 2021-08-04 ASSESSMENT — PAIN DESCRIPTION - LOCATION
LOCATION: ABDOMEN

## 2021-08-04 ASSESSMENT — PAIN DESCRIPTION - PAIN TYPE
TYPE: SURGICAL PAIN

## 2021-08-04 ASSESSMENT — PAIN - FUNCTIONAL ASSESSMENT: PAIN_FUNCTIONAL_ASSESSMENT: 0-10

## 2021-08-04 ASSESSMENT — PAIN DESCRIPTION - ORIENTATION: ORIENTATION: MID

## 2021-08-04 NOTE — PROGRESS NOTES
handoff report received from [facility]   [phone] patient walked to the bathroom tolerated without difficulty   1645 resting quietly with her eyes closed no signs of distress noted   1733 report called to [facility] prior to transport to room    [phone] transferred to room 1125 via with belongings per transporter Deepika Handler.  Patient  updated on room number

## 2021-08-04 NOTE — CONSULTS
Hospitalist Consult Note      Name:  Jose Snider /Age/Sex: 1970  (49 y.o. female)   MRN & CSN:  [id number] & [id number] Admission Date/Time: 2021  7:50 AM   Location:  [address] [place] [address]- PCP: No primary care provider on file. Hospital Day: 1    Assessment and Plan:   Jose Snider is a 49 y.o.  female  who presents with Morbid obesity [facility]  Hospitalist Team consulted for: Medical Management s/p gastric sleeve    Morbid obesity  S/p gastric sleeve POD 0  Abdominal pain, secondary to above  Nausea with emesis  -Consulted for medical management  -Doing well postop except for mild midepigastric pain and PONV x1 episode, nonbilious nonbloody  -Abdominal pain radiating into the chest.  Low suspicion for CAD type chest pain as patient received full cardiac clearance with normal stress test and mostly unremarkable echocardiogram.  We will trend troponins at this time  -Recommend continuing antiemetics with Reglan and Zofran. If persists we can augment with promethazine. Patient did have 1 dose postoperatively at 1219.  -Continue pain management with morphine  -Continue SCDs, IS, ambulate patient  -Bariatric clear liquid diet. Diet to be advanced under the guidance of general surgery  -A. m. labs      Diet BARIATRIC DIET; Bariatric Clear Liquid   DVT Prophylaxis [] Lovenox, []  Heparin, [x] SCDs, [] Ambulation   GI Prophylaxis [] PPI,  [] H2 Blocker,  [] Carafate,  [] Diet/Tube Feeds   Code Status Full Code   Disposition Patient requires continued admission due to abdominal surgery   MDM [] Low, [x] Moderate,[]  High  Patient's risk as above due to acuity of condition with potential for decompensation. History of Present Illness:     Chief Complaint: Morbid obesity ([address]  Jose Snider is a 49 y.o.  female  who presents today for bariatric surgery. Patient is POD 0 and successfully underwent gastric sleeve placement today. Overall patient is tolerating procedure well.   Does have midepigastric pressure that patient states is \"gas pain. \"  Endorses that it radiates up into the left chest.  Quantifies the pain as 8/10 on the pain scale. Endorses alleviating factor of belching and denies aggravating factor. Denies shortness of breath or diaphoresis. Patient endorses one episode of nausea with emesis postoperatively. States it was clear in color and denies any bilious or bloody streaks. Patient denies any past medical history except morbid obesity and compliant with preoperative weight loss regimen. Overall patient is doing well and denies fever, chills, headache, shortness of breath, cough, diarrhea, dark tarry stools, blood per rectum, dysuria, frequency, or urgency. Review of Systems   Constitutional: Negative for appetite change, chills, diaphoresis, fatigue and fever. HENT: Negative for congestion, rhinorrhea and sore throat. Eyes: Negative for visual disturbance. Respiratory: Negative for cough, chest tightness, shortness of breath and wheezing. Cardiovascular: Negative for chest pain and palpitations. Gastrointestinal: Positive for abdominal pain, nausea and vomiting. Negative for anal bleeding, blood in stool, constipation and diarrhea. Genitourinary: Negative for dysuria, frequency, hematuria and urgency. Musculoskeletal: Negative for arthralgias and back pain. Skin: Positive for wound (Postoperative wound). Negative for color change, pallor and rash. Neurological: Negative for dizziness, seizures, syncope, speech difficulty, weakness, numbness and headaches. Psychiatric/Behavioral: Negative for confusion. Objective:        Intake/Output Summary (Last 24 hours) at 8/4/2021 1918  Last data filed at 8/4/2021 1330  Gross per 24 hour   Intake 200 ml   Output 50 ml   Net 150 ml      Vitals:   Vitals:    08/04/21 1845   BP: (!) 141/78   Pulse: 83   Resp: 16   Temp: 98 °F (36.7 °C)   SpO2: 97%     Physical Exam:   Physical Exam  Vitals and nursing note reviewed. Constitutional:       General: She is awake. She is not in acute distress. Appearance: Normal appearance. She is morbidly obese. She is not ill-appearing, toxic-appearing or diaphoretic. Interventions: She is not intubated. Comments: Appears in pain   HENT:      Head: Atraumatic. Right Ear: External ear normal.      Left Ear: External ear normal.      Nose: Nose normal. No rhinorrhea. Mouth/Throat:      Mouth: Mucous membranes are moist.   Eyes:      General: No scleral icterus. Conjunctiva/sclera: Conjunctivae normal.      Pupils: Pupils are equal, round, and reactive to light. Cardiovascular:      Rate and Rhythm: Normal rate and regular rhythm. Pulses: Normal pulses. Heart sounds: Normal heart sounds. No murmur heard. No gallop. Pulmonary:      Effort: Pulmonary effort is normal. No tachypnea, accessory muscle usage or respiratory distress. She is not intubated. Breath sounds: Normal breath sounds. No wheezing, rhonchi or rales. Abdominal:      General: Abdomen is protuberant. Bowel sounds are normal. There is no distension. Palpations: Abdomen is soft. Tenderness: There is abdominal tenderness in the epigastric area. There is no guarding or rebound. Negative signs include Mustafa's sign and Rovsing's sign. Musculoskeletal:         General: Normal range of motion. Cervical back: Neck supple. Right lower leg: No edema. Left lower leg: No edema. Skin:     General: Skin is warm and dry. Capillary Refill: Capillary refill takes less than 2 seconds. Neurological:      General: No focal deficit present. Mental Status: She is alert and oriented to person, place, and time. Mental status is at baseline. Cranial Nerves: No cranial nerve deficit, dysarthria or facial asymmetry. Motor: No tremor or seizure activity. Psychiatric:         Mood and Affect: Mood is not anxious.          Speech: She is communicative. Speech is not slurred. Behavior: Behavior is cooperative.          Medications:   Medications:    sodium chloride flush  5-40 mL Intravenous 2 times per day    ceFAZolin  2,000 mg Intravenous Q8H      Infusions:    sodium chloride      lactated ringers 125 mL/hr at 08/04/21 1603     PRN Meds: HYDROcodone-acetaminophen, 1 tablet, Q6H PRN  sodium chloride flush, 10 mL, PRN  sodium chloride, 25 mL, PRN  potassium chloride, 40 mEq, PRN   Or  potassium alternative oral replacement, 40 mEq, PRN   Or  potassium chloride, 10 mEq, PRN  morphine, 2 mg, Q2H PRN   Or  morphine, 4 mg, Q2H PRN  ondansetron, 4 mg, Q6H PRN  metoclopramide, 10 mg, Q6H PRN          Electronically signed by Fernandez Rivera DO on 8/4/2021 at 7:18 PM

## 2021-08-04 NOTE — ANESTHESIA POSTPROCEDURE EVALUATION
Department of Anesthesiology  Postprocedure Note    Patient: Gina Siegel  MRN: [id number]  YOB: 1970  Date of evaluation: 8/4/2021  Time:  12:37 PM     Procedure Summary     Date: 08/04/21 Room / Location: [address] [place] [address]    Anesthesia Start: 1003 Anesthesia Stop:     Procedures:       GASTRECTOMY SLEEVE LAPAROSCOPIC ROBOTIC  POSS HIATAL HERNIA REPAIR (N/A Abdomen)      EGD ESOPHAGOGASTRODUODENOSCOPY (N/A ) Diagnosis: (MORBID OBESITY)    Surgeons: Marisa Nesbitt MD Responsible Provider: Julissa Dobbs MD    Anesthesia Type: general ASA Status: 3          Anesthesia Type: No value filed. Alfonso Phase I: Alfonso Score: 9    Alfonso Phase II:      Last vitals: Reviewed and per EMR flowsheets.        Anesthesia Post Evaluation    Patient location during evaluation: PACU  Level of consciousness: awake  Pain score: 0  Airway patency: patent  Nausea & Vomiting: no vomiting and no nausea  Complications: no  Cardiovascular status: blood pressure returned to baseline and hemodynamically stable  Respiratory status: acceptable, nasal cannula, nonlabored ventilation and spontaneous ventilation  Hydration status: stable

## 2021-08-04 NOTE — PROGRESS NOTES
1400- Pt resting quietly with eyes closed. No signs or symptoms of distress noted. 1430- Pt resting quietly with eyes closed. No signs or symptoms of distress noted.

## 2021-08-04 NOTE — H&P
History and Physical Update  Original H&P done in office on 07/13/2021 (less than 30 days ago). Pt reports the following changes in health since being seen last: None  Down -13lbs on liver shrinking diet. Tolerated well. No questions or concerns at this time    Vitals:    08/04/21 0813   BP: 136/67   Pulse: 90   Resp: 18   Temp: 97.6 °F (36.4 °C)   SpO2: 98%       Alert and oriented x 3, no apparent distress at rest  Atraumatic, normocephalic. EOMI. Breathing unlabored. RRR. Soft, non-tender, non-distended. Moves all extremities. Warm, dry. Karishma Ge is a 48 yo female here today for elective bariatric surgery    -Consent obtained in office. -Abx ordered in office.  -Reviewed expected pre-operative, operative, and post-operative courses. -Answered questions to patient's satisfaction.   -Reviewed risks, benefits, alternative to procedure.   -Proceed as scheduled. -The patient was counseled at length about the risks of laura Covid-19 during their perioperative period and any recovery window from their procedure. The patient was made aware that laura Covid-19  may worsen their prognosis for recovering from their procedure  and lend to a higher morbidity and/or mortality risk. All material risks, benefits, and reasonable alternatives including postponing the procedure were discussed. The patient does wish to proceed with the procedure at this time. UZMA Pugh - CNP      Pt seen. Agree with above. Proceed with surgery. Pt's questions answered. D/w pt's  as well.     Inna Dumont MD

## 2021-08-04 NOTE — OP NOTE
holding area. The patient was secured to the operating room table using multiple straps and a foot board. All pressure points were well-padded. The abdomen was then double prepped in a standard surgical fashion. An approved timeout was held with all members of the operating room team present and in agreement. Using an #11 blade, a 5 mm incision was made in the left lower mid abdomen. The abdomen was entered under direct vision using a 5 mm 0 degree laparoscope housed in a 5 mm optical trocar. Pneumoperitoneum was established using CO2 to 15 mm Hg. Once inside the abdominal cavity, an inspection ensued. The remaining ports were placed under direct vision. The ports placed were the following sizes and locations: three 8 mm ports across the mid abdomen in a lateral orientation, the initial 5 mm port was upsized to an 8 mm port, a 12 mm assistant port in the right lower mid abdomen, and a 5 mm liver retractor port in the subxiphoid location. There was no injury to any intra-abdominal structures during port placement. The robot was brought to the operating room table on the patient's left side. The robot was docked. The patient was placed in the steep reverse Trendelenburg. A liver retractor was secured to the table and passed into the abdominal cavity. The liver was retracted cephalad and anteriorly, exposing the esophageal hiatus. The anterior gastric fat pad was identified. The angle of His was bluntly mobilized. The esophageal hiatus was inspected and it was determined that there was no evidence of an hiatal hernia. Attention was directed toward the pylorus. The vein of garcia was used to confirm the position. Approximately six centimeters proximal to the pylorus, the vessel sealer was used to divide the gastrocolic omentum. The lesser sac was opened and the division of this omentum was carried up all the way to the angle of His.  The short gastrics were identified and carefully cauterized, using the vessel sealer. After completing the division of the greater curvature attachments, a 38 [place] bougie was passed toward the pylorus. Sequential 60mm firings of a da Cristin SureForm stapler were performed (2 green and 4 blue), taking care not to overly narrow the pouch, specifically at the incisura. At the angle of His, an outpuching of tissue was left to ensure that the staple line did not encroach on the esophagus. The outpouching of the gastric sleeve at the cardia was imbricated and the entire staple line was oversewn. An intraoperative leak test was performed which demonstrated no leak. Esophagogastroduodenoscopy (EGD) was performed. The esophagus was intubated without difficulty. The scope was advanced into the sleeve stomach which was appropriately narrowed but patent. The scope was advanced to the pylorus, proving patency of the sleeve stomach at the incisura. The robot was undocked and moved away from the operating room table. The 12 mm port site was then widened with a Ning clamp and the gastric sleeve specimen was removed intact and passed off for permanent pathology. The extraction site was closed with several trans-fascial 0-Vicryl sutures. All skin incisions were closed using a 4-0 Vicryl in a subcuticular fashion. Dermabond was used to cover the incisions. The patient was extubated and moved to the recovery room in stable condition. At the end of the case, the needle, instrument, and sponge counts were correct x 2. Dr. Arnold Causey was present, scrubbed, and supervised the entire case. Dr. Arnold Causey personally informed the family of the outcome of the procedure.     Michael Anderson MD

## 2021-08-04 NOTE — PROGRESS NOTES
1230- Pt arrived from OR awake and following commands. Abdomen soft. Monitor applied. Alarms on and verified. Bedside hand off provided by Carleen Nyhan RN & Logan HARRELL. 1240- Pt denies surgical pain/discomfort at this time. 1245- Pt denies nausea at this time. 1250- Pt turned and repositioned. Bed linens and pad changed. 1301- Medicated for surgical pain/discomfort. 12- Updated family on pt status. 1330- Pacu care complete. Waiting on an inpt bed.

## 2021-08-05 LAB
ALBUMIN SERPL-MCNC: 3.7 GM/DL (ref 3.4–5)
ALP BLD-CCNC: 64 IU/L (ref 40–128)
ALT SERPL-CCNC: 72 U/L (ref 10–40)
ANION GAP SERPL CALCULATED.3IONS-SCNC: 12 MMOL/L (ref 4–16)
AST SERPL-CCNC: 73 IU/L (ref 15–37)
BASOPHILS ABSOLUTE: 0 K/CU MM
BASOPHILS RELATIVE PERCENT: 0.1 % (ref 0–1)
BILIRUB SERPL-MCNC: 0.7 MG/DL (ref 0–1)
BUN BLDV-MCNC: 11 MG/DL (ref 6–23)
CALCIUM SERPL-MCNC: 9 MG/DL (ref 8.3–10.6)
CHLORIDE BLD-SCNC: 101 MMOL/L (ref 99–110)
CO2: 24 MMOL/L (ref 21–32)
CREAT SERPL-MCNC: 0.8 MG/DL (ref 0.6–1.1)
DIFFERENTIAL TYPE: ABNORMAL
EKG ATRIAL RATE: 97 BPM
EKG DIAGNOSIS: NORMAL
EKG P AXIS: 47 DEGREES
EKG P-R INTERVAL: 174 MS
EKG Q-T INTERVAL: 352 MS
EKG QRS DURATION: 78 MS
EKG QTC CALCULATION (BAZETT): 447 MS
EKG R AXIS: 2 DEGREES
EKG T AXIS: 36 DEGREES
EKG VENTRICULAR RATE: 97 BPM
EOSINOPHILS ABSOLUTE: 0 K/CU MM
EOSINOPHILS RELATIVE PERCENT: 0 % (ref 0–3)
GFR AFRICAN AMERICAN: >60 ML/MIN/1.73M2
GFR NON-AFRICAN AMERICAN: >60 ML/MIN/1.73M2
GLUCOSE BLD-MCNC: 91 MG/DL (ref 70–99)
HCT VFR BLD CALC: 38 % (ref 37–47)
HEMOGLOBIN: 12.8 GM/DL (ref 12.5–16)
IMMATURE NEUTROPHIL %: 0.3 % (ref 0–0.43)
LYMPHOCYTES ABSOLUTE: 0.6 K/CU MM
LYMPHOCYTES RELATIVE PERCENT: 8.1 % (ref 24–44)
MCH RBC QN AUTO: 29.9 PG (ref 27–31)
MCHC RBC AUTO-ENTMCNC: 33.7 % (ref 32–36)
MCV RBC AUTO: 88.8 FL (ref 78–100)
MONOCYTES ABSOLUTE: 0.6 K/CU MM
MONOCYTES RELATIVE PERCENT: 9 % (ref 0–4)
NUCLEATED RBC %: 0 %
PDW BLD-RTO: 13.1 % (ref 11.7–14.9)
PLATELET # BLD: 186 K/CU MM (ref 140–440)
PMV BLD AUTO: 11.2 FL (ref 7.5–11.1)
POTASSIUM SERPL-SCNC: 4.6 MMOL/L (ref 3.5–5.1)
RBC # BLD: 4.28 M/CU MM (ref 4.2–5.4)
SEGMENTED NEUTROPHILS ABSOLUTE COUNT: 5.7 K/CU MM
SEGMENTED NEUTROPHILS RELATIVE PERCENT: 82.5 % (ref 36–66)
SODIUM BLD-SCNC: 137 MMOL/L (ref 135–145)
TOTAL IMMATURE NEUTOROPHIL: 0.02 K/CU MM
TOTAL NUCLEATED RBC: 0 K/CU MM
TOTAL PROTEIN: 5.8 GM/DL (ref 6.4–8.2)
TROPONIN T: <0.01 NG/ML
TROPONIN T: <0.01 NG/ML
WBC # BLD: 6.9 K/CU MM (ref 4–10.5)

## 2021-08-05 PROCEDURE — APPNB30 APP NON BILLABLE TIME 0-30 MINS: Performed by: NURSE PRACTITIONER

## 2021-08-05 PROCEDURE — 6360000002 HC RX W HCPCS: Performed by: NURSE PRACTITIONER

## 2021-08-05 PROCEDURE — 99024 POSTOP FOLLOW-UP VISIT: CPT | Performed by: NURSE PRACTITIONER

## 2021-08-05 PROCEDURE — 80053 COMPREHEN METABOLIC PANEL: CPT

## 2021-08-05 PROCEDURE — 2580000003 HC RX 258: Performed by: SURGERY

## 2021-08-05 PROCEDURE — 94010 BREATHING CAPACITY TEST: CPT

## 2021-08-05 PROCEDURE — 76937 US GUIDE VASCULAR ACCESS: CPT

## 2021-08-05 PROCEDURE — 94150 VITAL CAPACITY TEST: CPT

## 2021-08-05 PROCEDURE — 6360000002 HC RX W HCPCS: Performed by: SURGERY

## 2021-08-05 PROCEDURE — 94761 N-INVAS EAR/PLS OXIMETRY MLT: CPT

## 2021-08-05 PROCEDURE — 1200000000 HC SEMI PRIVATE

## 2021-08-05 PROCEDURE — 93010 ELECTROCARDIOGRAM REPORT: CPT | Performed by: INTERNAL MEDICINE

## 2021-08-05 PROCEDURE — 85025 COMPLETE CBC W/AUTO DIFF WBC: CPT

## 2021-08-05 PROCEDURE — 36415 COLL VENOUS BLD VENIPUNCTURE: CPT

## 2021-08-05 PROCEDURE — 84484 ASSAY OF TROPONIN QUANT: CPT

## 2021-08-05 RX ORDER — KETOROLAC TROMETHAMINE 30 MG/ML
30 INJECTION, SOLUTION INTRAMUSCULAR; INTRAVENOUS EVERY 6 HOURS
Status: DISCONTINUED | OUTPATIENT
Start: 2021-08-05 | End: 2021-08-06 | Stop reason: HOSPADM

## 2021-08-05 RX ORDER — PROMETHAZINE HYDROCHLORIDE 25 MG/ML
12.5 INJECTION, SOLUTION INTRAMUSCULAR; INTRAVENOUS EVERY 6 HOURS PRN
Status: DISCONTINUED | OUTPATIENT
Start: 2021-08-05 | End: 2021-08-06 | Stop reason: HOSPADM

## 2021-08-05 RX ADMIN — ENOXAPARIN SODIUM 40 MG: 40 INJECTION SUBCUTANEOUS at 10:58

## 2021-08-05 RX ADMIN — KETOROLAC TROMETHAMINE 30 MG: 30 INJECTION, SOLUTION INTRAMUSCULAR; INTRAVENOUS at 23:35

## 2021-08-05 RX ADMIN — KETOROLAC TROMETHAMINE 30 MG: 30 INJECTION, SOLUTION INTRAMUSCULAR; INTRAVENOUS at 10:57

## 2021-08-05 RX ADMIN — ENOXAPARIN SODIUM 40 MG: 40 INJECTION SUBCUTANEOUS at 23:35

## 2021-08-05 RX ADMIN — ONDANSETRON 4 MG: 2 INJECTION INTRAMUSCULAR; INTRAVENOUS at 04:29

## 2021-08-05 RX ADMIN — CEFAZOLIN SODIUM 2000 MG: 2 SOLUTION INTRAVENOUS at 12:17

## 2021-08-05 RX ADMIN — SODIUM CHLORIDE, POTASSIUM CHLORIDE, SODIUM LACTATE AND CALCIUM CHLORIDE: 600; 310; 30; 20 INJECTION, SOLUTION INTRAVENOUS at 10:56

## 2021-08-05 RX ADMIN — PROMETHAZINE HYDROCHLORIDE 12.5 MG: 25 INJECTION INTRAMUSCULAR; INTRAVENOUS at 10:58

## 2021-08-05 RX ADMIN — ONDANSETRON 4 MG: 2 INJECTION INTRAMUSCULAR; INTRAVENOUS at 15:25

## 2021-08-05 RX ADMIN — SODIUM CHLORIDE, PRESERVATIVE FREE 10 ML: 5 INJECTION INTRAVENOUS at 23:46

## 2021-08-05 RX ADMIN — CEFAZOLIN SODIUM 2000 MG: 2 SOLUTION INTRAVENOUS at 04:23

## 2021-08-05 RX ADMIN — KETOROLAC TROMETHAMINE 30 MG: 30 INJECTION, SOLUTION INTRAMUSCULAR; INTRAVENOUS at 15:25

## 2021-08-05 RX ADMIN — ONDANSETRON 4 MG: 2 INJECTION INTRAMUSCULAR; INTRAVENOUS at 23:34

## 2021-08-05 ASSESSMENT — PAIN SCALES - GENERAL
PAINLEVEL_OUTOF10: 6
PAINLEVEL_OUTOF10: 5
PAINLEVEL_OUTOF10: 6

## 2021-08-05 NOTE — PROGRESS NOTES
BARIATRIC SURGERY PROGRESS NOTE    HPI: Mi Ibarra is a 48 yo female POD #1 s/p robot assisted sleeve gastrectomy. Doing okay this morning. Still with nausea and some vomiting. = OOB. +IS use. Pain is tolerable. Incisions look good. C/O CP overnight. Vitals:    08/04/21 2354 08/05/21 0419 08/05/21 0530 08/05/21 0839   BP: 121/66 (!) 147/78     Pulse:  99     Resp: 16 16     Temp: 98.5 °F (36.9 °C) 98.8 °F (37.1 °C)     TempSrc: Oral Oral     SpO2: 100% 94%  94%   Weight:   241 lb 10 oz (109.6 kg)    Height:         I/O last 3 completed shifts: In: 9208 [I.V.:1486; IV Piggyback:150]  Out: 300 [Urine:250; Blood:50]  No intake/output data recorded. BARIATRIC DIET;  Bariatric Clear Liquid    Recent Results (from the past 48 hour(s))   Troponin    Collection Time: 08/04/21  9:41 PM   Result Value Ref Range    Troponin T <0.010 <0.01 NG/ML   EKG 12 Lead    Collection Time: 08/04/21 10:40 PM   Result Value Ref Range    Ventricular Rate 97 BPM    Atrial Rate 97 BPM    P-R Interval 174 ms    QRS Duration 78 ms    Q-T Interval 352 ms    QTc Calculation (Bazett) 447 ms    P Axis 47 degrees    R Axis 2 degrees    T Axis 36 degrees    Diagnosis       Normal sinus rhythm  Possible Inferior infarct , age undetermined  Abnormal ECG  No previous ECGs available     Comprehensive Metabolic Panel w/ Reflex to MG    Collection Time: 08/05/21  3:56 AM   Result Value Ref Range    Sodium 137 135 - 145 MMOL/L    Potassium 4.6 3.5 - 5.1 MMOL/L    Chloride 101 99 - 110 mMol/L    CO2 24 21 - 32 MMOL/L    BUN 11 6 - 23 MG/DL    CREATININE 0.8 0.6 - 1.1 MG/DL    Glucose 91 70 - 99 MG/DL    Calcium 9.0 8.3 - 10.6 MG/DL    Albumin 3.7 3.4 - 5.0 GM/DL    Total Protein 5.8 (L) 6.4 - 8.2 GM/DL    Total Bilirubin 0.7 0.0 - 1.0 MG/DL    ALT 72 (H) 10 - 40 U/L    AST 73 (H) 15 - 37 IU/L    Alkaline Phosphatase 64 40 - 128 IU/L    GFR Non-African American >60 >60 mL/min/1.73m2    GFR African American >60 >60 mL/min/1.73m2    Anion Gap 12 4 - 16   CBC auto differential    Collection Time: 08/05/21  3:56 AM   Result Value Ref Range    WBC 6.9 4.0 - 10.5 K/CU MM    RBC 4.28 4.2 - 5.4 M/CU MM    Hemoglobin 12.8 12.5 - 16.0 GM/DL    Hematocrit 38.0 37 - 47 %    MCV 88.8 78 - 100 FL    MCH 29.9 27 - 31 PG    MCHC 33.7 32.0 - 36.0 %    RDW 13.1 11.7 - 14.9 %    Platelets 710 125 - 701 K/CU MM    MPV 11.2 (H) 7.5 - 11.1 FL    Differential Type AUTOMATED DIFFERENTIAL     Segs Relative 82.5 (H) 36 - 66 %    Lymphocytes % 8.1 (L) 24 - 44 %    Monocytes % 9.0 (H) 0 - 4 %    Eosinophils % 0.0 0 - 3 %    Basophils % 0.1 0 - 1 %    Segs Absolute 5.7 K/CU MM    Lymphocytes Absolute 0.6 K/CU MM    Monocytes Absolute 0.6 K/CU MM    Eosinophils Absolute 0.0 K/CU MM    Basophils Absolute 0.0 K/CU MM    Nucleated RBC % 0.0 %    Total Nucleated RBC 0.0 K/CU MM    Total Immature Neutrophil 0.02 K/CU MM    Immature Neutrophil % 0.3 0 - 0.43 %   Troponin    Collection Time: 08/05/21  3:56 AM   Result Value Ref Range    Troponin T <0.010 <0.01 NG/ML   Troponin    Collection Time: 08/05/21  6:57 AM   Result Value Ref Range    Troponin T <0.010 <0.01 NG/ML       Scheduled Meds:   enoxaparin  40 mg Subcutaneous BID    ketorolac  30 mg Intravenous Q6H    sodium chloride flush  5-40 mL Intravenous 2 times per day    ceFAZolin  2,000 mg Intravenous Q8H     Continuous Infusions:   sodium chloride      lactated ringers 125 mL/hr at 08/04/21 2350       Physical Exam:  HEENT: Anicteric sclerae, Oropharyngeal mucosae moist, pink and intact. Heart:  Normal S1 and S2, RRR  Lungs: Clear to auscultation bilaterally, No audible Wheezes or Rales. Extremities: No edema. Neuro: Alert and Oriented x 3, Non focal.  Abdomen: Soft, appropriately tender, Non distended, Positive bowel sounds. Incision: Nicely healing: No erythema, No discharge, glue intact      Principal Problem: Morbid obesity ([address]  Resolved Problems:    * No resolved hospital problems.  *      Assessment and Plan:  Maralee Litten is a 49 y.o. female who is POD # 1 status post robot assisted sleeve gastrectomy. - Pain is tolerable  - Will add phenergan for nausea  - Stay on clear liquids for now. Will assess later  - Labs unremarkable  - Toradol and lovenox held yesterday after surgery. Will start today as hemoglobin is stable. Increase Ambulation to at least 4x/day walk in the hallways with assistance. Respiratory shabana-operative care: encourage incentive Spirometry / deep breathing and coughing 10x/hr while awake. Continue DVT prophylaxis with Teds and SCDs and SC Lovenox.   Continue GI prophylaxis with Protonix IV till able to tolerate [address] [address], APRN - CNP, CNP    8/5/2021  10:27 AM  ___________________________________________

## 2021-08-05 NOTE — PROGRESS NOTES
Patient instructed and educated on Minka. Patient able to do 1000 ml. Vital capacity. Patient's goal is 2100ml.  Electronically signed by Kindra Guthrie RCP on 8/5/2021 at 11:37 AM

## 2021-08-05 NOTE — PROGRESS NOTES
Hospitalist Progress Note       8/5/2021 9:52 AM  Admit Date: 8/4/2021    PCP: No primary care provider on file. Assessment and Plan:   1. Morbid obesity: Patient had elective gastric sleeve on 8/4/2021. Perioperative management per primary team including analgesic, antibiotics and DVT prophylaxis. 2.  Epigastric abdominal pain: Patient develop epigastric pain radiating to the chest after surgery. Troponin x3-negative. Preop stress test and echo in 2/21 were unremarkable. Low threshold for acute coronary syndrome. Analgesic and antiemetic. Will follow postoperative recommendation by surgery. 3.  Abnormal LFT: Monitor. Chronic problems include history of kidney stones    DVT prophylaxis: Lovenox. Patient Active Problem List:     Lipoma of right forearm     Morbid obesity ([address]      Subjective:     No chief complaint on file. F/U:  Interval History:  at bedside. Patient complains of nausea. No chest pain. Objective: Intake/Output Summary (Last 24 hours) at 8/5/2021 0952  Last data filed at 8/5/2021 7699  Gross per 24 hour   Intake 1636 ml   Output 300 ml   Net 1336 ml      Vitals:   Vitals:    08/05/21 0839   BP:    Pulse:    Resp:    Temp:    SpO2: 94%     Physical Exam:  General: No apparent respiratory distress  Eyes: Not pale. Anicteric. Cardiovascular: Regular. No murmur no S3  Respiratory: No wheezes or crepitation  Extremities: No pedal edema.     Electronically signed by Soren Villatoro MD on 8/5/2021 at 9:52 AM  Rounding Hospitalist

## 2021-08-05 NOTE — CONSULTS
Consult completed. Nexiva 20g 1.75 inch catheter inserted via ultrasound in patient's OLY. Brisk blood return noted and catheter flushes with ease. Patient tolerated well. Consult IV/PICC team if patient's needs change.

## 2021-08-06 VITALS
TEMPERATURE: 98 F | HEIGHT: 61 IN | DIASTOLIC BLOOD PRESSURE: 69 MMHG | RESPIRATION RATE: 18 BRPM | OXYGEN SATURATION: 98 % | WEIGHT: 241.62 LBS | BODY MASS INDEX: 45.62 KG/M2 | SYSTOLIC BLOOD PRESSURE: 128 MMHG | HEART RATE: 70 BPM

## 2021-08-06 LAB
ALBUMIN SERPL-MCNC: 3.5 GM/DL (ref 3.4–5)
ALP BLD-CCNC: 58 IU/L (ref 40–128)
ALT SERPL-CCNC: 41 U/L (ref 10–40)
ANION GAP SERPL CALCULATED.3IONS-SCNC: 11 MMOL/L (ref 4–16)
AST SERPL-CCNC: 31 IU/L (ref 15–37)
BASOPHILS ABSOLUTE: 0 K/CU MM
BASOPHILS RELATIVE PERCENT: 0.6 % (ref 0–1)
BILIRUB SERPL-MCNC: 0.6 MG/DL (ref 0–1)
BUN BLDV-MCNC: 12 MG/DL (ref 6–23)
CALCIUM SERPL-MCNC: 8.7 MG/DL (ref 8.3–10.6)
CHLORIDE BLD-SCNC: 106 MMOL/L (ref 99–110)
CO2: 24 MMOL/L (ref 21–32)
CREAT SERPL-MCNC: 0.7 MG/DL (ref 0.6–1.1)
DIFFERENTIAL TYPE: ABNORMAL
EOSINOPHILS ABSOLUTE: 0 K/CU MM
EOSINOPHILS RELATIVE PERCENT: 0.2 % (ref 0–3)
GFR AFRICAN AMERICAN: >60 ML/MIN/1.73M2
GFR NON-AFRICAN AMERICAN: >60 ML/MIN/1.73M2
GLUCOSE BLD-MCNC: 80 MG/DL (ref 70–99)
HCT VFR BLD CALC: 38 % (ref 37–47)
HEMOGLOBIN: 12.2 GM/DL (ref 12.5–16)
IMMATURE NEUTROPHIL %: 0.2 % (ref 0–0.43)
LYMPHOCYTES ABSOLUTE: 0.9 K/CU MM
LYMPHOCYTES RELATIVE PERCENT: 17.4 % (ref 24–44)
MCH RBC QN AUTO: 29.9 PG (ref 27–31)
MCHC RBC AUTO-ENTMCNC: 32.1 % (ref 32–36)
MCV RBC AUTO: 93.1 FL (ref 78–100)
MONOCYTES ABSOLUTE: 0.4 K/CU MM
MONOCYTES RELATIVE PERCENT: 8.4 % (ref 0–4)
NUCLEATED RBC %: 0 %
PDW BLD-RTO: 13.2 % (ref 11.7–14.9)
PLATELET # BLD: 146 K/CU MM (ref 140–440)
PMV BLD AUTO: 10.8 FL (ref 7.5–11.1)
POTASSIUM SERPL-SCNC: 4.2 MMOL/L (ref 3.5–5.1)
RBC # BLD: 4.08 M/CU MM (ref 4.2–5.4)
SEGMENTED NEUTROPHILS ABSOLUTE COUNT: 3.7 K/CU MM
SEGMENTED NEUTROPHILS RELATIVE PERCENT: 73.2 % (ref 36–66)
SODIUM BLD-SCNC: 141 MMOL/L (ref 135–145)
TOTAL IMMATURE NEUTOROPHIL: 0.01 K/CU MM
TOTAL NUCLEATED RBC: 0 K/CU MM
TOTAL PROTEIN: 5.6 GM/DL (ref 6.4–8.2)
WBC # BLD: 5.1 K/CU MM (ref 4–10.5)

## 2021-08-06 PROCEDURE — 6360000002 HC RX W HCPCS: Performed by: SURGERY

## 2021-08-06 PROCEDURE — 6360000002 HC RX W HCPCS: Performed by: NURSE PRACTITIONER

## 2021-08-06 PROCEDURE — 80053 COMPREHEN METABOLIC PANEL: CPT

## 2021-08-06 PROCEDURE — 99024 POSTOP FOLLOW-UP VISIT: CPT | Performed by: SURGERY

## 2021-08-06 PROCEDURE — 36415 COLL VENOUS BLD VENIPUNCTURE: CPT

## 2021-08-06 PROCEDURE — 2580000003 HC RX 258: Performed by: SURGERY

## 2021-08-06 PROCEDURE — APPNB30 APP NON BILLABLE TIME 0-30 MINS: Performed by: NURSE PRACTITIONER

## 2021-08-06 PROCEDURE — 99024 POSTOP FOLLOW-UP VISIT: CPT | Performed by: NURSE PRACTITIONER

## 2021-08-06 PROCEDURE — 85025 COMPLETE CBC W/AUTO DIFF WBC: CPT

## 2021-08-06 RX ORDER — AMOXICILLIN 250 MG
1 CAPSULE ORAL DAILY
Qty: 14 TABLET | Refills: 0 | Status: SHIPPED | OUTPATIENT
Start: 2021-08-06 | End: 2021-08-20

## 2021-08-06 RX ORDER — ONDANSETRON 4 MG/1
4 TABLET, ORALLY DISINTEGRATING ORAL 3 TIMES DAILY PRN
Qty: 21 TABLET | Refills: 2 | Status: ON HOLD | OUTPATIENT
Start: 2021-08-06 | End: 2021-08-28 | Stop reason: SDUPTHER

## 2021-08-06 RX ORDER — PANTOPRAZOLE SODIUM 20 MG/1
20 TABLET, DELAYED RELEASE ORAL 2 TIMES DAILY
Qty: 60 TABLET | Refills: 3 | Status: SHIPPED | OUTPATIENT
Start: 2021-08-06 | End: 2022-03-18

## 2021-08-06 RX ORDER — HYDROCODONE BITARTRATE AND ACETAMINOPHEN 5; 325 MG/1; MG/1
1 TABLET ORAL EVERY 6 HOURS PRN
Qty: 20 TABLET | Refills: 0 | Status: SHIPPED | OUTPATIENT
Start: 2021-08-06 | End: 2021-08-11

## 2021-08-06 RX ADMIN — ENOXAPARIN SODIUM 40 MG: 40 INJECTION SUBCUTANEOUS at 09:12

## 2021-08-06 RX ADMIN — ONDANSETRON 4 MG: 2 INJECTION INTRAMUSCULAR; INTRAVENOUS at 09:11

## 2021-08-06 RX ADMIN — KETOROLAC TROMETHAMINE 30 MG: 30 INJECTION, SOLUTION INTRAMUSCULAR; INTRAVENOUS at 09:11

## 2021-08-06 RX ADMIN — SODIUM CHLORIDE, PRESERVATIVE FREE 10 ML: 5 INJECTION INTRAVENOUS at 09:12

## 2021-08-06 ASSESSMENT — PAIN DESCRIPTION - PROGRESSION
CLINICAL_PROGRESSION: GRADUALLY IMPROVING

## 2021-08-06 ASSESSMENT — PAIN - FUNCTIONAL ASSESSMENT: PAIN_FUNCTIONAL_ASSESSMENT: ACTIVITIES ARE NOT PREVENTED

## 2021-08-06 ASSESSMENT — PAIN DESCRIPTION - ONSET: ONSET: ON-GOING

## 2021-08-06 ASSESSMENT — PAIN DESCRIPTION - DESCRIPTORS
DESCRIPTORS: ACHING
DESCRIPTORS: SORE

## 2021-08-06 ASSESSMENT — PAIN DESCRIPTION - ORIENTATION: ORIENTATION: MID

## 2021-08-06 ASSESSMENT — PAIN SCALES - GENERAL
PAINLEVEL_OUTOF10: 0
PAINLEVEL_OUTOF10: 5
PAINLEVEL_OUTOF10: 0

## 2021-08-06 ASSESSMENT — PAIN DESCRIPTION - PAIN TYPE: TYPE: SURGICAL PAIN

## 2021-08-06 ASSESSMENT — PAIN DESCRIPTION - LOCATION
LOCATION: ABDOMEN
LOCATION: ABDOMEN

## 2021-08-06 ASSESSMENT — PAIN DESCRIPTION - FREQUENCY: FREQUENCY: CONTINUOUS

## 2021-08-06 NOTE — PROGRESS NOTES
Anion Gap 12 4 - 16   CBC auto differential    Collection Time: 08/05/21  3:56 AM   Result Value Ref Range    WBC 6.9 4.0 - 10.5 K/CU MM    RBC 4.28 4.2 - 5.4 M/CU MM    Hemoglobin 12.8 12.5 - 16.0 GM/DL    Hematocrit 38.0 37 - 47 %    MCV 88.8 78 - 100 FL    MCH 29.9 27 - 31 PG    MCHC 33.7 32.0 - 36.0 %    RDW 13.1 11.7 - 14.9 %    Platelets 380 356 - 432 K/CU MM    MPV 11.2 (H) 7.5 - 11.1 FL    Differential Type AUTOMATED DIFFERENTIAL     Segs Relative 82.5 (H) 36 - 66 %    Lymphocytes % 8.1 (L) 24 - 44 %    Monocytes % 9.0 (H) 0 - 4 %    Eosinophils % 0.0 0 - 3 %    Basophils % 0.1 0 - 1 %    Segs Absolute 5.7 K/CU MM    Lymphocytes Absolute 0.6 K/CU MM    Monocytes Absolute 0.6 K/CU MM    Eosinophils Absolute 0.0 K/CU MM    Basophils Absolute 0.0 K/CU MM    Nucleated RBC % 0.0 %    Total Nucleated RBC 0.0 K/CU MM    Total Immature Neutrophil 0.02 K/CU MM    Immature Neutrophil % 0.3 0 - 0.43 %   Troponin    Collection Time: 08/05/21  3:56 AM   Result Value Ref Range    Troponin T <0.010 <0.01 NG/ML   Troponin    Collection Time: 08/05/21  6:57 AM   Result Value Ref Range    Troponin T <0.010 <0.01 NG/ML       Scheduled Meds:   enoxaparin  40 mg Subcutaneous BID    ketorolac  30 mg Intravenous Q6H    sodium chloride flush  5-40 mL Intravenous 2 times per day     Continuous Infusions:   sodium chloride      lactated ringers Stopped (08/05/21 1639)       Physical Exam:  HEENT: Anicteric sclerae, Oropharyngeal mucosae moist, pink and intact. Heart:  Normal S1 and S2, RRR  Lungs: Clear to auscultation bilaterally, No audible Wheezes or Rales. Extremities: No edema. Neuro: Alert and Oriented x 3, Non focal.  Abdomen: Soft, appropriately tender, Non distended, Positive bowel sounds. Incision: Nicely healing: No erythema, No discharge, glue intact      Principal Problem: Morbid obesity ([address]  Resolved Problems:    * No resolved hospital problems. *      Assessment and Plan:  Jose Snider is a 49 y.o. female who is POD # 2 status post robot assisted sleeve gastrectomy. - Will advance to bariatric full liquids  - Labs to be drawn this am  - Met fluid goal  - Will plan for discharge later this afternoon once she nears 32oz fluid goal.  Discussed discharge, medications, restrictions, postop care, and diet stages. Patients verbalizes understanding. Increase Ambulation to at least 4x/day walk in the hallways with assistance. Respiratory shabana-operative care: encourage incentive Spirometry / deep breathing and coughing 10x/hr while awake. Continue DVT prophylaxis with Teds and SCDs and SC Lovenox. Continue GI prophylaxis with Protonix IV till able to tolerate [address] [address], APRN - CNP, CNP    8/6/2021  9:05 AM  ___________________________________________      Agree with above. Pt seen independently. No acute events. N/V resolved and tolerating adequate PO. No F/C. No CP/SOB. No extremity swelling.  -Advance to bariatric fulls.  -Planned home later today. -F/u as scheduled. -D/w pt and  plans for home including diet, activity, medications. All questions answered.      Luis Bridges MD

## 2021-08-06 NOTE — ADT AUTH CERT
Utilization Reviews       Gastric Restrictive Procedure with Gastric Bypass by Laparoscopy - Care Day 2 (8/5/2021) by Dedra Bone RN       Review Status Review Entered   Completed 8/6/2021 12:32      Criteria Review      Care Day: 2 Care Date: 8/5/2021 Level of Care: Telemetry    Guideline Day 2    Clinical Status    (X) * Procedure completed    (X) * Hemodynamic stability    8/6/2021 12:32 PM EDT by Mica Burnette      110/44 75 17 Temp 99 97% room air    (X) * No evidence of postoperative or surgical site infection    ( ) * Diet tolerated    8/6/2021 12:32 PM EDT by Carmen Albert with nausea and some vomiting    (X) * Blood glucose under acceptable control (if diabetic)    8/6/2021 12:32 PM EDT by Allen Washburn Results for [facility] \"YIN\" (MRN [id number]) as of 8/6/2021 12:32    8/5/2021 03:56  Glucose: 91    (X) * Pain absent or managed    ( ) * Discharge plans and education understood    Activity    (X) * Ambulatory or acceptable for next level of care    Routes    (X) * Oral hydration    8/6/2021 12:32 PM EDT by Mica Burnette      BARIATRIC DIET; Bariatric Full Liquid [[id number]]    lactated ringers infusion   Rate: 125 mL/hr Freq: CONTINUOUS Route: IV    (X) * Oral medications or regimen acceptable for next level of care    8/6/2021 12:32 PM EDT by Allen Washburn see med list    (X) * Oral diet or acceptable for next level of care    8/6/2021 12:32 PM EDT by Allen Washburn BARIATRIC DIET; Bariatric Full Liquid [[id number]]    Medications    ( ) * Outpatient diabetic medication regimen established (if diabetic)    * Milestone   Additional Notes   8/5/21      General Surgery Note:      BARIATRIC SURGERY PROGRESS NOTE       HPI: Aman Saldivar is a 50 yo female POD #1 s/p robot assisted sleeve gastrectomy. Doing okay this morning. Still with nausea and some vomiting. = OOB. +IS use. Pain is tolerable. Incisions look good.  C/O CP overnight.                 Physical Exam: LSW reviewed chart/screened Pt for discharge needs. Pt is from home. Pt has PCP. Pt is independent of ADL prior to admission. Pt has med/Rx insurance and is able to afford Rx. Pt has no DME or HC in the home. Pt discharge plan is to return home with no needs. CM to continue to follow.           Results for [facility] \"YIN\" (MRN [id number]) as of 8/6/2021 12:32      8/5/2021 03:56   Sodium: 137   Potassium: 4.6   Chloride: 101   CO2: 24   BUN: 11   Creatinine: 0.8   Anion Gap: 12   GFR Non-: >60   GFR African American: >60   Glucose: 91   Calcium: 9.0   Total Protein: 5.8 (L)   Troponin T: <0.010   Albumin: 3.7   Alk Phos: 64   ALT: 72 (H)   AST: 73 (H)   Bilirubin: 0.7   WBC: 6.9   RBC: 4.28   Hemoglobin Quant: 12.8   Hematocrit: 38.0   MCV: 88.8   MCH: 29.9   MCHC: 33.7   MPV: 11.2 (H)   RDW: 13.1   Platelet Count: 829   Lymphocyte %: 8.1 (L)   Monocytes %: 9.0 (H)   Eosinophils %: 0.0   Basophils %: 0.1   Lymphocytes Absolute: 0.6   Monocytes Absolute: 0.6   Eosinophils Absolute: 0.0   Basophils Absolute: 0.0   Differential Type: AUTOMATED DIFFERENTIAL   Segs Relative: 82.5 (H)   Segs Absolute: 5.7   Nucleated RBC %: 0.0   Immature Neutrophil %: 0.3   Total Immature Neutrophil: 0.02   Total Nucleated RBC: 0.0      8/5/2021 06:57   Troponin T: <0.010         ceFAZolin (ANCEF) 2000 mg in dextrose 3 % 50 mL IVPB (duplex)    Dose: 2,000 mg   Freq: EVERY 8 HOURS Route: IV      enoxaparin (LOVENOX) injection 40 mg    Dose: 40 mg   Freq: 2 TIMES DAILY Route: SC      ketorolac (TORADOL) injection 30 mg    Dose: 30 mg   Freq: EVERY 6 HOURS Route: IV      lactated ringers infusion    Rate: 125 mL/hr Freq: CONTINUOUS Route: IV      ondansetron (ZOFRAN) injection 4 mg    Dose: 4 mg   Freq: EVERY 6 HOURS PRN Route: IV x 3       promethazine (PHENERGAN) injection 12.5 mg    Dose: 12.5 mg   Freq: EVERY 6 HOURS PRN Route: IV x 1

## 2021-08-06 NOTE — PLAN OF CARE
Problem: Falls - Risk of:  Goal: Will remain free from falls  Description: Will remain free from falls  Outcome: Completed  Goal: Absence of physical injury  Description: Absence of physical injury  Outcome: Completed     Problem: Pain:  Goal: Pain level will decrease  Description: Pain level will decrease  Outcome: Completed  Goal: Control of acute pain  Description: Control of acute pain  Outcome: Completed  Goal: Control of chronic pain  Description: Control of chronic pain  Outcome: Completed     Problem: Activity:  Goal: Ability to return to normal activity level will improve  Description: Ability to return to normal activity level will improve  Outcome: Completed     Problem:  Bowel/Gastric:  Goal: Gastrointestinal status for postoperative course will improve  Description: Gastrointestinal status for postoperative course will improve  Outcome: Completed     Problem: Health Behavior:  Goal: Identification of resources available to assist in meeting health care needs will improve  Description: Identification of resources available to assist in meeting health care needs will improve  Outcome: Completed     Problem: Physical Regulation:  Goal: Postoperative complications will be avoided or minimized  Description: Postoperative complications will be avoided or minimized  Outcome: Completed     Problem: Respiratory:  Goal: Ability to achieve and maintain a regular respiratory rate will improve  Description: Ability to achieve and maintain a regular respiratory rate will improve  Outcome: Completed     Problem: Safety:  Goal: Ability to remain free from injury will improve  Description: Ability to remain free from injury will improve  Outcome: Completed     Problem: Sensory:  Goal: General experience of comfort will improve  Description: General experience of comfort will improve  Outcome: Completed     Problem: Skin Integrity:  Goal: Demonstration of wound healing without infection will improve  Description: Demonstration of wound healing without infection will improve  Outcome: Completed

## 2021-08-06 NOTE — DISCHARGE SUMMARY
Discharge Summary     Patient ID:  Mat Kebede  [id number]  27 y.o.  1970    Admit date: 8/4/2021    Discharge date: 8/6/2021     Admitting Physician: Cinthia Ivey MD     Admission Diagnoses: Morbid obesity ([facility] [E66.01]  Patient Active Problem List   Diagnosis    Lipoma of right forearm    Morbid obesity ([facility]     Past Medical History:   Diagnosis Date    COVID-19     \"had + covid test 4/2021- only symptom was headache\"- negative covid test 5/21/2021    H/O echocardiogram 02/25/2021    Mild LVH, AR, TR & MR.    History of kidney stones     last stone: 2006    Wears dentures     full upper plate    Wears glasses     to read       Discharge Diagnoses: same    Admission Condition: Good. Discharged Condition: Good. Indication for Admission: Elective bariatric surgery. Hospital Course: Patient had an uneventful hospital course after her bariatric procedure that went uneventfully: robot assisted sleeve gastrectomy and was tolerating bariatric stage II diet and ambulating without difficulty at the time of discharge. Consults: Hospitalist / PCP. Treatments: IV hydration, antibiotics, analgesia, LMW heparin, respiratory therapy: O2 and incentive spirometry and surgery: robot assisted sleeve gastrectomy. Discharge Exam:  See daily progress note    Discharge vitals: Wt Readings from Last 3 Encounters:   08/05/21 241 lb 10 oz (109.6 kg)   07/30/21 243 lb 8 oz (110.5 kg)   07/30/21 243 lb 8 oz (110.5 kg)   ,  Temp Readings from Last 3 Encounters:   08/06/21 98 °F (36.7 °C) (Oral)   08/04/21 97.7 °F (36.5 °C)   07/21/21 97.2 °F (36.2 °C) (Temporal)   ,  BP Readings from Last 3 Encounters:   08/06/21 128/69   08/04/21 (!) 117/100   07/30/21 124/80   ,  Pulse Readings from Last 3 Encounters:   08/06/21 70   07/30/21 80   07/21/21 85        Disposition: home.     Patient Instructions:   Current Discharge Medication List      START taking these medications    Details   ondansetron (ZOFRAN-ODT) 4 MG disintegrating tablet Take 1 tablet by mouth 3 times daily as needed for Nausea or Vomiting  Qty: 21 tablet, Refills: 2      HYDROcodone-acetaminophen (NORCO) 5-325 MG per tablet Take 1 tablet by mouth every 6 hours as needed for Pain for up to 5 days. Intended supply: 5 days. Take lowest dose possible to manage pain  Qty: 20 tablet, Refills: 0    Comments: Reduce doses taken as pain becomes manageable  Associated Diagnoses: Morbid obesity ([address]      senna-docusate (SENOKOT S) 8.6-50 MG per tablet Take 1 tablet by mouth daily for 14 days  Qty: 14 tablet, Refills: 0         CONTINUE these medications which have CHANGED    Details   pantoprazole (PROTONIX) 20 MG tablet Take 1 tablet by mouth 2 times daily  Qty: 60 tablet, Refills: 3         STOP taking these medications       Multiple Vitamins-Minerals (MULTIVITAMIN WOMEN PO) Comments:   Reason for Stopping:             Activity: no lifting > 20 Lbs, or Strenuous exercise for 3 weeks. Diet: encourage fluids and bariatric stage II diet for 2 wks.   Wound Care: keep wound clean and dry    Call  [phone] to make a follow-up appointment  with Dr Gioavnny Flower in 1 week.    ____________________________________________    Signed:    UZMA Sousa CNP, APRN-CNP    8/6/2021  12:56 PM

## 2021-08-06 NOTE — PROGRESS NOTES
Hospitalist Progress Note       8/6/2021 9:17 AM  Admit Date: 8/4/2021    PCP: No primary care provider on file. Assessment and Plan:   1. Morbid obesity: Patient had elective gastric sleeve on 8/4/2021. Perioperative management per primary team including analgesic, antibiotics and DVT prophylaxis. 2.  Epigastric abdominal pain: Patient develop epigastric pain radiating to the chest after surgery. Troponin x3-negative. Preop stress test and echo in 2/21 were unremarkable. The pain has resolved today. Low threshold for acute coronary syndrome, serial troponins have been negative. Analgesic and antiemetic. Will follow postoperative recommendation by surgery. 3.  Abnormal LFT: Monitor. Chronic problems include history of kidney stones    DVT prophylaxis: Lovenox. Patient Active Problem List:     Lipoma of right forearm     Morbid obesity ([address]      Subjective:     No chief complaint on file. F/U:  Interval History:  at bedside. Patient examined at bedside. She is comfortable in bed and denies any complaints. She is very eager to go home. Surgery notes reviewed and okay from medical viewpoint to be discharged today. Objective:     No intake or output data in the 24 hours ending 08/06/21 0917   Vitals:   Vitals:    08/06/21 0905   BP: 128/69   Pulse: 70   Resp: 18   Temp: 98 °F (36.7 °C)   SpO2: 98%     Physical Exam:  General: No apparent respiratory distress  Eyes: Not pale. Anicteric. Cardiovascular: Regular. No murmur no S3  Respiratory: No wheezes or crepitation  Extremities: No pedal edema.     Electronically signed by Ashlee Bronson MD on 8/6/2021 at 9:17 AM  Rounding Hospitalist

## 2021-08-13 ENCOUNTER — OFFICE VISIT (OUTPATIENT)
Dept: BARIATRICS/WEIGHT MGMT | Age: 51
End: 2021-08-13

## 2021-08-13 VITALS
WEIGHT: 226.8 LBS | DIASTOLIC BLOOD PRESSURE: 80 MMHG | BODY MASS INDEX: 41.73 KG/M2 | HEIGHT: 62 IN | SYSTOLIC BLOOD PRESSURE: 108 MMHG | HEART RATE: 90 BPM

## 2021-08-13 DIAGNOSIS — E66.01 MORBID OBESITY (HCC): Primary | ICD-10-CM

## 2021-08-13 DIAGNOSIS — Z98.84 STATUS POST LAPAROSCOPIC SLEEVE GASTRECTOMY: ICD-10-CM

## 2021-08-13 DIAGNOSIS — Z98.84 STATUS POST BARIATRIC SURGERY: ICD-10-CM

## 2021-08-13 PROCEDURE — 99024 POSTOP FOLLOW-UP VISIT: CPT | Performed by: SURGERY

## 2021-08-13 NOTE — PROGRESS NOTES
BARIATRIC SURGERY POST OPERATIVE NOTE    SUBJECTIVE:    Patient presenting today referred from No primary care provider on file. , for   Chief Complaint   Patient presents with   [facility] Post Op Follow Up     1st P/O Gastric Sleeve with Dr Valarie Abraham, 8/4/21     /80 (Site: Right Upper Arm, Position: Sitting, Cuff Size: Large Adult)   Pulse 90   Ht 5' 2\" (1.575 m)   Wt 226 lb 12.8 oz (102.9 kg)   BMI 41.48 kg/m²      HPI: Jose Snider is a 49 y.o. female s/p sleeve gastrectomy 8/4/21, and lost 16.7 Lbs,  Hydrates 30 Oz propel, Protein shakes 30 gm. Will Start with MVI and Ca/D      Current Outpatient Medications:     pantoprazole (PROTONIX) 20 MG tablet, Take 1 tablet by mouth 2 times daily, Disp: 60 tablet, Rfl: 3    ondansetron (ZOFRAN-ODT) 4 MG disintegrating tablet, Take 1 tablet by mouth 3 times daily as needed for Nausea or Vomiting, Disp: 21 tablet, Rfl: 2    senna-docusate (SENOKOT S) 8.6-50 MG per tablet, Take 1 tablet by mouth daily for 14 days, Disp: 14 tablet, Rfl: 0    senna-docusate (SENOKOT S) 8.6-50 MG per tablet, Take 1 tablet by mouth daily for 14 days, Disp: 14 tablet, Rfl: 0  Past Medical History:   Diagnosis Date    COVID-19     \"had + covid test 4/2021- only symptom was headache\"- negative covid test 5/21/2021    H/O echocardiogram 02/25/2021    Mild LVH, AR, TR & MR.    History of kidney stones     last stone: 2006    Wears dentures     full upper plate    Wears glasses     to read      Past Surgical History:   Procedure Laterality Date    [facility]    CYSTOSCOPY  2007?    stone manipulation    DILATION AND CURETTAGE OF UTERUS  1986    HYSTERECTOMY  2007    LITHOTRIPSY      ( not sure if had this done-per pt)    SLEEVE GASTRECTOMY N/A 8/4/2021    GASTRECTOMY SLEEVE LAPAROSCOPIC ROBOTIC  POSS HIATAL HERNIA REPAIR performed by Pete Guzman MD at [facility] N/A 5/28/2021    EGD BIOPSY performed by Cinthia Ivey MD at [facility] N/A 8/4/2021    EGD ESOPHAGOGASTRODUODENOSCOPY performed by Cinthia Ivey MD at [address] [place] [address] History     Socioeconomic History    Marital status:      Spouse name: None    Number of children: None    Years of education: None    Highest education level: None   Occupational History    None   Tobacco Use    Smoking status: Never Smoker    Smokeless tobacco: Never Used   Vaping Use    Vaping Use: Never used   Substance and Sexual Activity    Alcohol use: No     Comment: average \"1-2 times per year    Drug use: No    Sexual activity: Yes   Other Topics Concern    None   Social History Narrative    None     Social Determinants of Health     Financial Resource Strain:     Difficulty of Paying Living Expenses:    Food Insecurity:     Worried About Running Out of Food in the Last Year:     Ran Out of Food in the Last Year:    Transportation Needs:     Lack of Transportation (Medical):  Lack of Transportation (Non-Medical):    Physical Activity:     Days of Exercise per Week:     Minutes of Exercise per Session:    Stress:     Feeling of Stress :    Social Connections:     Frequency of Communication with Friends and Family:     Frequency of Social Gatherings with Friends and Family:     Attends Christian Services:     Active Member of Clubs or Organizations:     Attends Club or Organization Meetings:     Marital Status:    Intimate Partner Violence:     Fear of Current or Ex-Partner:     Emotionally Abused:     Physically Abused:     Sexually Abused:      Family History   Problem Relation Age of Onset    Asthma Mother         copd    Heart Disease Mother         chf    Stroke Father      Review of Systems      OBJECTIVE:    Physical Exam      Assessment / Plan:    1. Morbid obesity ([address]    2. Status post bariatric surgery    3.  Status post laparoscopic sleeve gastrectomy Doing very well, incisions nicely healing, will f/u with Dr Corcoran Felt in 4 wks. Follow Up:  Return in about 4 weeks (around 9/10/2021) for Bariatric follow up: diet, exercise & weight loss, Follow up Symptoms.     Sugar Robins MD, FACS, FICS  Member of the [address] of Met[employer] and [employer]    [phone]    8/13/21

## 2021-08-17 ENCOUNTER — HOSPITAL ENCOUNTER (INPATIENT)
Age: 51
LOS: 2 days | Discharge: HOME | DRG: 392 | End: 2021-08-19
Attending: EMERGENCY MEDICINE | Admitting: INTERNAL MEDICINE
Payer: COMMERCIAL

## 2021-08-17 ENCOUNTER — APPOINTMENT (OUTPATIENT)
Dept: CT IMAGING | Age: 51
DRG: 392 | End: 2021-08-17
Payer: COMMERCIAL

## 2021-08-17 DIAGNOSIS — R10.9 ABDOMINAL PAIN, UNSPECIFIED ABDOMINAL LOCATION: Primary | ICD-10-CM

## 2021-08-17 DIAGNOSIS — K52.9 COLITIS: ICD-10-CM

## 2021-08-17 LAB
ALBUMIN SERPL-MCNC: 4.3 GM/DL (ref 3.4–5)
ALP BLD-CCNC: 89 IU/L (ref 40–129)
ALT SERPL-CCNC: 33 U/L (ref 10–40)
ANION GAP SERPL CALCULATED.3IONS-SCNC: 19 MMOL/L (ref 4–16)
AST SERPL-CCNC: 24 IU/L (ref 15–37)
BASOPHILS ABSOLUTE: 0 K/CU MM
BASOPHILS RELATIVE PERCENT: 0.5 % (ref 0–1)
BILIRUB SERPL-MCNC: 1.5 MG/DL (ref 0–1)
BUN BLDV-MCNC: 20 MG/DL (ref 6–23)
CALCIUM SERPL-MCNC: 9.6 MG/DL (ref 8.3–10.6)
CHLORIDE BLD-SCNC: 94 MMOL/L (ref 99–110)
CO2: 19 MMOL/L (ref 21–32)
CREAT SERPL-MCNC: 0.9 MG/DL (ref 0.6–1.1)
DIFFERENTIAL TYPE: ABNORMAL
EOSINOPHILS ABSOLUTE: 0.1 K/CU MM
EOSINOPHILS RELATIVE PERCENT: 0.9 % (ref 0–3)
GFR AFRICAN AMERICAN: >60 ML/MIN/1.73M2
GFR NON-AFRICAN AMERICAN: >60 ML/MIN/1.73M2
GLUCOSE BLD-MCNC: 129 MG/DL (ref 70–99)
HCT VFR BLD CALC: 49.6 % (ref 37–47)
HEMOGLOBIN: 16.1 GM/DL (ref 12.5–16)
IMMATURE NEUTROPHIL %: 0.3 % (ref 0–0.43)
LACTATE: 1.7 MMOL/L (ref 0.4–2)
LIPASE: 470 IU/L (ref 13–60)
LYMPHOCYTES ABSOLUTE: 1.2 K/CU MM
LYMPHOCYTES RELATIVE PERCENT: 17.9 % (ref 24–44)
MCH RBC QN AUTO: 29.1 PG (ref 27–31)
MCHC RBC AUTO-ENTMCNC: 32.5 % (ref 32–36)
MCV RBC AUTO: 89.5 FL (ref 78–100)
MONOCYTES ABSOLUTE: 0.5 K/CU MM
MONOCYTES RELATIVE PERCENT: 7.8 % (ref 0–4)
NUCLEATED RBC %: 0 %
PDW BLD-RTO: 13.6 % (ref 11.7–14.9)
PLATELET # BLD: 193 K/CU MM (ref 140–440)
PMV BLD AUTO: 12.5 FL (ref 7.5–11.1)
POTASSIUM SERPL-SCNC: 3.6 MMOL/L (ref 3.5–5.1)
RBC # BLD: 5.54 M/CU MM (ref 4.2–5.4)
SEGMENTED NEUTROPHILS ABSOLUTE COUNT: 4.8 K/CU MM
SEGMENTED NEUTROPHILS RELATIVE PERCENT: 72.6 % (ref 36–66)
SODIUM BLD-SCNC: 132 MMOL/L (ref 135–145)
TOTAL IMMATURE NEUTOROPHIL: 0.02 K/CU MM
TOTAL NUCLEATED RBC: 0 K/CU MM
TOTAL PROTEIN: 7.6 GM/DL (ref 6.4–8.2)
TROPONIN T: <0.01 NG/ML
WBC # BLD: 6.6 K/CU MM (ref 4–10.5)

## 2021-08-17 PROCEDURE — 96375 TX/PRO/DX INJ NEW DRUG ADDON: CPT

## 2021-08-17 PROCEDURE — 99283 EMERGENCY DEPT VISIT LOW MDM: CPT

## 2021-08-17 PROCEDURE — 6370000000 HC RX 637 (ALT 250 FOR IP): Performed by: NURSE PRACTITIONER

## 2021-08-17 PROCEDURE — 83690 ASSAY OF LIPASE: CPT

## 2021-08-17 PROCEDURE — 1200000000 HC SEMI PRIVATE

## 2021-08-17 PROCEDURE — 6360000002 HC RX W HCPCS: Performed by: EMERGENCY MEDICINE

## 2021-08-17 PROCEDURE — 96376 TX/PRO/DX INJ SAME DRUG ADON: CPT

## 2021-08-17 PROCEDURE — 6360000002 HC RX W HCPCS: Performed by: NURSE PRACTITIONER

## 2021-08-17 PROCEDURE — G0378 HOSPITAL OBSERVATION PER HR: HCPCS

## 2021-08-17 PROCEDURE — 99221 1ST HOSP IP/OBS SF/LOW 40: CPT | Performed by: SURGERY

## 2021-08-17 PROCEDURE — 85025 COMPLETE CBC W/AUTO DIFF WBC: CPT

## 2021-08-17 PROCEDURE — 83605 ASSAY OF LACTIC ACID: CPT

## 2021-08-17 PROCEDURE — 96365 THER/PROPH/DIAG IV INF INIT: CPT

## 2021-08-17 PROCEDURE — 80053 COMPREHEN METABOLIC PANEL: CPT

## 2021-08-17 PROCEDURE — 96374 THER/PROPH/DIAG INJ IV PUSH: CPT

## 2021-08-17 PROCEDURE — 84484 ASSAY OF TROPONIN QUANT: CPT

## 2021-08-17 PROCEDURE — 74177 CT ABD & PELVIS W/CONTRAST: CPT

## 2021-08-17 PROCEDURE — 2580000003 HC RX 258: Performed by: EMERGENCY MEDICINE

## 2021-08-17 PROCEDURE — 2580000003 HC RX 258: Performed by: NURSE PRACTITIONER

## 2021-08-17 PROCEDURE — 6360000004 HC RX CONTRAST MEDICATION: Performed by: EMERGENCY MEDICINE

## 2021-08-17 PROCEDURE — 96361 HYDRATE IV INFUSION ADD-ON: CPT

## 2021-08-17 RX ORDER — ACETAMINOPHEN 650 MG/1
650 SUPPOSITORY RECTAL EVERY 6 HOURS PRN
Status: DISCONTINUED | OUTPATIENT
Start: 2021-08-17 | End: 2021-08-19 | Stop reason: HOSPADM

## 2021-08-17 RX ORDER — SENNA AND DOCUSATE SODIUM 50; 8.6 MG/1; MG/1
1 TABLET, FILM COATED ORAL DAILY
Status: DISCONTINUED | OUTPATIENT
Start: 2021-08-17 | End: 2021-08-19 | Stop reason: HOSPADM

## 2021-08-17 RX ORDER — SODIUM CHLORIDE 9 MG/ML
25 INJECTION, SOLUTION INTRAVENOUS PRN
Status: DISCONTINUED | OUTPATIENT
Start: 2021-08-17 | End: 2021-08-19 | Stop reason: HOSPADM

## 2021-08-17 RX ORDER — ONDANSETRON 2 MG/ML
4 INJECTION INTRAMUSCULAR; INTRAVENOUS ONCE
Status: COMPLETED | OUTPATIENT
Start: 2021-08-17 | End: 2021-08-17

## 2021-08-17 RX ORDER — SODIUM CHLORIDE 0.9 % (FLUSH) 0.9 %
5-40 SYRINGE (ML) INJECTION EVERY 12 HOURS SCHEDULED
Status: DISCONTINUED | OUTPATIENT
Start: 2021-08-17 | End: 2021-08-19 | Stop reason: HOSPADM

## 2021-08-17 RX ORDER — SODIUM CHLORIDE 0.9 % (FLUSH) 0.9 %
5-40 SYRINGE (ML) INJECTION PRN
Status: DISCONTINUED | OUTPATIENT
Start: 2021-08-17 | End: 2021-08-19 | Stop reason: HOSPADM

## 2021-08-17 RX ORDER — PANTOPRAZOLE SODIUM 20 MG/1
20 TABLET, DELAYED RELEASE ORAL 2 TIMES DAILY
Status: DISCONTINUED | OUTPATIENT
Start: 2021-08-17 | End: 2021-08-18

## 2021-08-17 RX ORDER — HYDROMORPHONE HCL 110MG/55ML
PATIENT CONTROLLED ANALGESIA SYRINGE INTRAVENOUS
Status: DISPENSED
Start: 2021-08-17 | End: 2021-08-18

## 2021-08-17 RX ORDER — ONDANSETRON 4 MG/1
4 TABLET, ORALLY DISINTEGRATING ORAL EVERY 8 HOURS PRN
Status: DISCONTINUED | OUTPATIENT
Start: 2021-08-17 | End: 2021-08-19 | Stop reason: HOSPADM

## 2021-08-17 RX ORDER — HYDROMORPHONE HCL 110MG/55ML
0.5 PATIENT CONTROLLED ANALGESIA SYRINGE INTRAVENOUS ONCE
Status: COMPLETED | OUTPATIENT
Start: 2021-08-17 | End: 2021-08-17

## 2021-08-17 RX ORDER — POLYETHYLENE GLYCOL 3350 17 G/17G
17 POWDER, FOR SOLUTION ORAL DAILY PRN
Status: DISCONTINUED | OUTPATIENT
Start: 2021-08-17 | End: 2021-08-19 | Stop reason: HOSPADM

## 2021-08-17 RX ORDER — ONDANSETRON 4 MG/1
4 TABLET, ORALLY DISINTEGRATING ORAL 3 TIMES DAILY PRN
Status: DISCONTINUED | OUTPATIENT
Start: 2021-08-17 | End: 2021-08-19 | Stop reason: HOSPADM

## 2021-08-17 RX ORDER — ONDANSETRON 2 MG/ML
4 INJECTION INTRAMUSCULAR; INTRAVENOUS EVERY 6 HOURS PRN
Status: DISCONTINUED | OUTPATIENT
Start: 2021-08-17 | End: 2021-08-19 | Stop reason: HOSPADM

## 2021-08-17 RX ORDER — SODIUM CHLORIDE 9 MG/ML
INJECTION, SOLUTION INTRAVENOUS CONTINUOUS
Status: DISCONTINUED | OUTPATIENT
Start: 2021-08-17 | End: 2021-08-19 | Stop reason: HOSPADM

## 2021-08-17 RX ORDER — 0.9 % SODIUM CHLORIDE 0.9 %
1000 INTRAVENOUS SOLUTION INTRAVENOUS ONCE
Status: COMPLETED | OUTPATIENT
Start: 2021-08-17 | End: 2021-08-17

## 2021-08-17 RX ORDER — HYDROMORPHONE HCL 110MG/55ML
0.5 PATIENT CONTROLLED ANALGESIA SYRINGE INTRAVENOUS EVERY 4 HOURS PRN
Status: DISCONTINUED | OUTPATIENT
Start: 2021-08-17 | End: 2021-08-19 | Stop reason: HOSPADM

## 2021-08-17 RX ORDER — HYDROMORPHONE HCL 110MG/55ML
1 PATIENT CONTROLLED ANALGESIA SYRINGE INTRAVENOUS ONCE
Status: COMPLETED | OUTPATIENT
Start: 2021-08-17 | End: 2021-08-17

## 2021-08-17 RX ORDER — ACETAMINOPHEN 325 MG/1
650 TABLET ORAL EVERY 6 HOURS PRN
Status: DISCONTINUED | OUTPATIENT
Start: 2021-08-17 | End: 2021-08-19 | Stop reason: HOSPADM

## 2021-08-17 RX ORDER — HYDROMORPHONE HCL 110MG/55ML
1 PATIENT CONTROLLED ANALGESIA SYRINGE INTRAVENOUS ONCE
Status: DISCONTINUED | OUTPATIENT
Start: 2021-08-17 | End: 2021-08-17

## 2021-08-17 RX ADMIN — PIPERACILLIN AND TAZOBACTAM 3375 MG: 3; .375 INJECTION, POWDER, FOR SOLUTION INTRAVENOUS at 19:11

## 2021-08-17 RX ADMIN — SODIUM CHLORIDE 1000 ML: 9 INJECTION, SOLUTION INTRAVENOUS at 12:37

## 2021-08-17 RX ADMIN — HYDROMORPHONE HYDROCHLORIDE 0.5 MG: 2 INJECTION, SOLUTION INTRAMUSCULAR; INTRAVENOUS; SUBCUTANEOUS at 13:49

## 2021-08-17 RX ADMIN — HYDROMORPHONE HYDROCHLORIDE 0.5 MG: 2 INJECTION, SOLUTION INTRAMUSCULAR; INTRAVENOUS; SUBCUTANEOUS at 17:38

## 2021-08-17 RX ADMIN — IOPAMIDOL 80 ML: 755 INJECTION, SOLUTION INTRAVENOUS at 14:49

## 2021-08-17 RX ADMIN — SODIUM CHLORIDE: 9 INJECTION, SOLUTION INTRAVENOUS at 19:11

## 2021-08-17 RX ADMIN — HYDROMORPHONE HYDROCHLORIDE 1 MG: 2 INJECTION INTRAMUSCULAR; INTRAVENOUS; SUBCUTANEOUS at 12:30

## 2021-08-17 RX ADMIN — ONDANSETRON 4 MG: 2 INJECTION INTRAMUSCULAR; INTRAVENOUS at 21:35

## 2021-08-17 RX ADMIN — ONDANSETRON 4 MG: 2 INJECTION INTRAMUSCULAR; INTRAVENOUS at 12:37

## 2021-08-17 ASSESSMENT — PAIN SCALES - GENERAL
PAINLEVEL_OUTOF10: 2
PAINLEVEL_OUTOF10: 10
PAINLEVEL_OUTOF10: 10
PAINLEVEL_OUTOF10: 6

## 2021-08-17 ASSESSMENT — ENCOUNTER SYMPTOMS
COUGH: 0
SHORTNESS OF BREATH: 0
EYE PAIN: 0
EYE DISCHARGE: 0
VOMITING: 1
ABDOMINAL PAIN: 1
NAUSEA: 1
BACK PAIN: 0
RHINORRHEA: 0

## 2021-08-17 NOTE — H&P
History and Physical      Name:  Bhupinder Long /Age/Sex: 1970  (49 y.o. female)   MRN & CSN:  [id number] & [id number] Admission Date/Time: 2021 11:55 AM   Location:  ED26/ED-26 PCP: No primary care provider on file. Bhupinder Long is a 49 y.o.  female   With medical history of kidney stone, morbid obesity. She recently had a laparoscopic sleeve gastrectomy . She  presents with  Abdominal pain. Associated with generalized body  weakness and reduced oral intake. She does endorse some vomiting. And nausea   Denies any fevers. She does endorse some chills. She  Does not have  respiratory symptoms. No cough or sputum production. No difficulty breathing. No focal neurological  deficits. Patient states her  only home med currently is protonix. Denies any recent trauma . She had relif with pain medication and bowl movement in the ED. General surgery consulted. Plans to see her tomorrow. Recommended fluid, pain control and prophylactic antibiotic . Recommendation is appreciated. Assessment and Plan:     # Abdominal pain    Rated pain 8 out of ten on lower abdomen,    Made worse with movement. fluid, pain control, antibiotics (Zosyn)   Surgery consult. GI and DVT prophylaxis. # Elevated lipase    470,    Not quite sure of the source.    repeat lab in am,      Medications:   Medications:    piperacillin-tazobactam  3,375 mg Intravenous Once    piperacillin-tazobactam  3,375 mg Intravenous Q8H      Infusions:    sodium chloride       PRN Meds: HYDROmorphone, 0.5 mg, Q4H PRN        Current Facility-Administered Medications:     piperacillin-tazobactam (ZOSYN) 3,375 mg in dextrose 5 % 50 mL IVPB (mini-bag), 3,375 mg, Intravenous, Once, Carli Lowery MD    0.9 % sodium chloride infusion, , Intravenous, Continuous, UZMA Ash CNP    HYDROmorphone (DILAUDID) injection 0.5 mg, 0.5 mg, Intravenous, Q4H PRN, UZMA Norris CNP   piperacillin-tazobactam (ZOSYN) 3,375 mg in dextrose 5 % 50 mL IVPB extended infusion (mini-bag), 3,375 mg, Intravenous, Q8H, UZMA Ash CNP    Current Outpatient Medications:     ondansetron (ZOFRAN-ODT) 4 MG disintegrating tablet, Take 1 tablet by mouth 3 times daily as needed for Nausea or Vomiting, Disp: 21 tablet, Rfl: 2    pantoprazole (PROTONIX) 20 MG tablet, Take 1 tablet by mouth 2 times daily, Disp: 60 tablet, Rfl: 3    senna-docusate (SENOKOT S) 8.6-50 MG per tablet, Take 1 tablet by mouth daily for 14 days, Disp: 14 tablet, Rfl: 0    senna-docusate (SENOKOT S) 8.6-50 MG per tablet, Take 1 tablet by mouth daily for 14 days, Disp: 14 tablet, Rfl: 0       Review of Systems     GENERAL:  Denies fever, chills, night sweats, or changes in weight. EYES:  Denies recent visual changes. ENT:  Denies ear pain, hearing loss or tinnitus  RESP:  Denies any cough, dyspnea, or wheezing. CV:  Denies any chest pain with exertion or at rest, palpitations, syncope, or edema. GI:  Endorsed , nausea, vomiting, abdominal pain,   MUSCULOSKELETAL:  Denies any joint swelling, joint pain, or loss of range of motion.   NEURO:  Denies any headaches, tremors, dizziness, vertigo, memory loss,   PSYCH:  Denies any sleeping problems,   HEME/LYMPHATIC/IMMUNO:  Denies , bruising,  ENDO:  Denies any heat or cold intolerance,       Objective:     No intake or output data in the 24 hours ending 08/17/21 1832     Vitals:   Vitals:    08/17/21 1740   BP: 138/87   Pulse: 103   Resp: 17   Temp:    SpO2: 99%       Recent Results (from the past 24 hour(s))   Comprehensive Metabolic Panel w/ Reflex to MG    Collection Time: 08/17/21 12:16 PM   Result Value Ref Range    Sodium 132 (L) 135 - 145 MMOL/L    Potassium 3.6 3.5 - 5.1 MMOL/L    Chloride 94 (L) 99 - 110 mMol/L    CO2 19 (L) 21 - 32 MMOL/L    BUN 20 6 - 23 MG/DL    CREATININE 0.9 0.6 - 1.1 MG/DL    Glucose 129 (H) 70 - 99 MG/DL    Calcium 9.6 8.3 - 10.6 MG/DL    Albumin 4.3 3.4 - 5.0 GM/DL    Total Protein 7.6 6.4 - 8.2 GM/DL    Total Bilirubin 1.5 (H) 0.0 - 1.0 MG/DL    ALT 33 10 - 40 U/L    AST 24 15 - 37 IU/L    Alkaline Phosphatase 89 40 - 129 IU/L    GFR Non-African American >60 >60 mL/min/1.73m2    GFR African American >60 >60 mL/min/1.73m2    Anion Gap 19 (H) 4 - 16   CBC Auto Differential    Collection Time: 08/17/21 12:16 PM   Result Value Ref Range    WBC 6.6 4.0 - 10.5 K/CU MM    RBC 5.54 (H) 4.2 - 5.4 M/CU MM    Hemoglobin 16.1 (H) 12.5 - 16.0 GM/DL    Hematocrit 49.6 (H) 37 - 47 %    MCV 89.5 78 - 100 FL    MCH 29.1 27 - 31 PG    MCHC 32.5 32.0 - 36.0 %    RDW 13.6 11.7 - 14.9 %    Platelets 207 573 - 231 K/CU MM    MPV 12.5 (H) 7.5 - 11.1 FL    Differential Type AUTOMATED DIFFERENTIAL     Segs Relative 72.6 (H) 36 - 66 %    Lymphocytes % 17.9 (L) 24 - 44 %    Monocytes % 7.8 (H) 0 - 4 %    Eosinophils % 0.9 0 - 3 %    Basophils % 0.5 0 - 1 %    Segs Absolute 4.8 K/CU MM    Lymphocytes Absolute 1.2 K/CU MM    Monocytes Absolute 0.5 K/CU MM    Eosinophils Absolute 0.1 K/CU MM    Basophils Absolute 0.0 K/CU MM    Nucleated RBC % 0.0 %    Total Nucleated RBC 0.0 K/CU MM    Total Immature Neutrophil 0.02 K/CU MM    Immature Neutrophil % 0.3 0 - 0.43 %   Troponin    Collection Time: 08/17/21 12:16 PM   Result Value Ref Range    Troponin T <0.010 <0.01 NG/ML   Lipase    Collection Time: 08/17/21 12:16 PM   Result Value Ref Range    Lipase 470 (H) 13 - 60 IU/L   Lactic Acid, Plasma    Collection Time: 08/17/21 12:45 PM   Result Value Ref Range    Lactate 1.7 0.4 - 2.0 mMOL/L       Physical Exam:     GEN: Awake female, laying down  in Bed   in no  Acute t distress. EYES: Pupils are equally round. No scleral erythema, discharge, or conjunctivitis. HENT: Mucous membranes are moist. External ears and nose appear normal.   NECK: Supple, no apparent thyromegaly or masses. RESP:  Symmetric chest movement while on room air. No Respiratory Distress.    CARDIO/VASC:  S1 S2, regular rate and rhythm no extra heart sound   GI:  abdomen tender, active bowl sound  Rectal exam deferred. :  Rider catheter is not present. MSK: No gross joint deformities. SKIN: Normal coloration, warm, dry. NEURO: Cranial nerves appear grossly intact, normal speech, no lateralizing weakness. PSYCH: Awake, alert, oriented x 4. Affect appropriate. Past Medical History:      Past Medical History:   Diagnosis Date    COVID-19     \"had + covid test 4/2021- only symptom was headache\"- negative covid test 5/21/2021    H/O echocardiogram 02/25/2021    Mild LVH, AR, TR & MR.    History of kidney stones     last stone: 2006    Wears dentures     full upper plate    Wears glasses     to read     PSHX:  has a past surgical history that includes Lithotripsy; Cystoscopy (2007?); Hysterectomy (2007); Cholecystectomy (1987); Appendectomy (2015?); Dilation and curettage of uterus (1986); Upper gastrointestinal endoscopy (N/A, 5/28/2021); Sleeve Gastrectomy (N/A, 8/4/2021); and Upper gastrointestinal endoscopy (N/A, 8/4/2021). Allergies: Allergies   Allergen Reactions    Levofloxacin Shortness Of Breath       FAM HX: family history includes Asthma in her mother; Heart Disease in her mother; Stroke in her father.     Soc HX:   Social History     Socioeconomic History    Marital status:      Spouse name: Not on file    Number of children: Not on file    Years of education: Not on file    Highest education level: Not on file   Occupational History    Not on file   Tobacco Use    Smoking status: Never Smoker    Smokeless tobacco: Never Used   Vaping Use    Vaping Use: Never used   Substance and Sexual Activity    Alcohol use: No     Comment: average \"1-2 times per year    Drug use: No    Sexual activity: Yes   Other Topics Concern    Not on file   Social History Narrative    Not on file     Social Determinants of Health     Financial Resource Strain:     Difficulty of Paying Living Expenses: Food Insecurity:     Worried About Running Out of Food in the Last Year:     [address] in the Last Year:    Transportation Needs:     Lack of Transportation (Medical):      Lack of Transportation (Non-Medical):    Physical Activity:     Days of Exercise per Week:     Minutes of Exercise per Session:    Stress:     Feeling of Stress :    Social Connections:     Frequency of Communication with Friends and Family:     Frequency of Social Gatherings with Friends and Family:     Attends Mandaen Services:     Active Member of Clubs or Organizations:     Attends Club or Organization Meetings:     Marital Status:    Intimate Partner Violence:     Fear of Current or Ex-Partner:     Emotionally Abused:     Physically Abused:     Sexually Abused:        Electronically signed by UZMA Payton CNP on 8/17/2021 at 6:32 PM

## 2021-08-17 NOTE — ED NOTES
CT ABDOMEN PELVIS W IV CONTRAST Additional Contrast? None  Status: Final result   Order Providers    Authorizing Billing   MD Belen Baker MD          Signed by    Signed Date/Time Phone Pager   Genny Robin 8/17/2021  3:13 [phone]    Reading Providers    Read Date Phone Pager   Benny Fish Aug 17, 2021  3:13 [phone]    Radiation Dose Estimates    No radiation information found for this patient   Narrative   EXAMINATION:   CT OF THE ABDOMEN AND PELVIS WITH CONTRAST 8/17/2021 11:50 am       TECHNIQUE:   CT of the abdomen and pelvis was performed with the administration of   intravenous contrast. Multiplanar reformatted images are provided for review. Dose modulation, iterative reconstruction, and/or weight based adjustment of   the mA/kV was utilized to reduce the radiation dose to as low as reasonably   achievable.       COMPARISON:   12/20/2018       HISTORY:   ORDERING SYSTEM PROVIDED HISTORY: acute onset lower abdominal pain, s/p   recent gastric sleeve, has had multiple other abdominal surgeries   TECHNOLOGIST PROVIDED HISTORY:   Additional Contrast?->None   Reason for exam:->acute onset lower abdominal pain, s/p recent gastric   sleeve, has had multiple other abdominal surgeries   Decision Support Exception - unselect if not a suspected or confirmed   emergency medical condition->Emergency Medical Condition (MA)   Reason for Exam: acute onset lower abdominal pain, s/p recent gastric sleeve,   has had multiple other abdominal surgeries   Acuity: Acute   Type of Exam: Initial   Relevant Medical/Surgical History: 80 ML [address]UE [address]       FINDINGS:   Lower Chest: Calcified granuloma noted in the left costophrenic angle.  The   visualized lungs are otherwise clear.  Base of the heart is unremarkable. Visualized extra thoracic soft tissues are unremarkable.       Organs: No acute or suspicious hepatic abnormality is identified.  The portal   vein is patent.

## 2021-08-17 NOTE — ED NOTES
This RN notified David Riggs in pharmacy that zosyn is not available from the [facility].       Felton Barnett, [place]  08/17/21 [id number]

## 2021-08-17 NOTE — ED PROVIDER NOTES
[address]      Pt Name: Javed Jewell  MRN: [id number]  [place] 1970  Date of evaluation: 8/17/2021  Provider: Rina Collins MD    CHIEF COMPLAINT       Chief Complaint   Patient presents with    Abdominal Pain     had gastric sleeve done 2 weeks ago now with sudden onset pain          HISTORY OF PRESENT ILLNESS      Javed Jewell is a 49 y.o. female who presents to the emergency department  for   Chief Complaint   Patient presents with    Abdominal Pain     had gastric sleeve done 2 weeks ago now with sudden onset pain        49 yof  complaining of lower abdominal pain. She underwent a gastric sleeve placement about 2 weeks ago with Dr. Benny Rubi. She also has a history of multiple other abdominal pelvic surgeries including cholecystectomy and hysterectomy. Family notes that she has not been tolerating oral intake very well since the surgery. She has had some generalized weakness. They report today she went had a shower and then became more weak and began complaining of lower abdominal pain. She does endorse some vomiting. Denies any fevers. She does endorse some chills. She is not any remarkable respiratory symptoms. No cough or sputum production. No difficulty breathing. No focal logical deficits. Denies any recent trauma or injury to her abdomen. She is alert and orient to the emergency department. She is complaining significant abdominal pain. No signs of respiratory distress. She is speaking in full sentences. Nursing Notes, Triage Notes & Vital Signs were reviewed. REVIEW OF SYSTEMS    (2-9 systems for level 4, 10 or more for level 5)     Review of Systems   Constitutional: Negative for chills and fever. HENT: Negative for congestion, rhinorrhea and tinnitus. Eyes: Negative for pain and discharge. Respiratory: Negative for cough and shortness of breath. Cardiovascular: Negative for chest pain and palpitations. Gastrointestinal: Positive for abdominal pain, nausea and vomiting. Endocrine: Negative for polydipsia and polyuria. Genitourinary: Negative for dysuria and flank pain. Musculoskeletal: Negative for back pain and neck pain. Skin: Negative for pallor and wound. Neurological: Negative for dizziness, seizures, facial asymmetry, light-headedness, numbness and headaches. Psychiatric/Behavioral: Negative for confusion. Except as noted above the remainder of the review of systems was reviewed and negative. PAST MEDICAL HISTORY     Past Medical History:   Diagnosis Date    COVID-19     \"had + covid test 4/2021- only symptom was headache\"- negative covid test 5/21/2021    H/O echocardiogram 02/25/2021    Mild LVH, AR, TR & MR.    History of kidney stones     last stone: 2006    Wears dentures     full upper plate    Wears glasses     to read       Prior to Admission medications    Medication Sig Start Date End Date Taking? Authorizing Provider   ondansetron (ZOFRAN-ODT) 4 MG disintegrating tablet Take 1 tablet by mouth 3 times daily as needed for Nausea or Vomiting 8/6/21  Yes UZMA Dominguez CNP   pantoprazole (PROTONIX) 20 MG tablet Take 1 tablet by mouth 2 times daily 8/6/21   UZMA Dominguez CNP        Patient Active Problem List   Diagnosis    Lipoma of right forearm    Morbid obesity ([facility] [address]    Status post bariatric surgery    Status post laparoscopic sleeve gastrectomy         SURGICAL HISTORY       Past Surgical History:   Procedure Laterality Date    [facility]  2015?    [facility]    CYSTOSCOPY  2007?    stone manipulation    DILATION AND CURETTAGE OF UTERUS  1986    HYSTERECTOMY  2007    LITHOTRIPSY      ( not sure if had this done-per pt)    SLEEVE GASTRECTOMY N/A 8/4/2021    GASTRECTOMY SLEEVE LAPAROSCOPIC ROBOTIC  POSS HIATAL HERNIA REPAIR performed by Viviano Lesches, MD at [facility] N/A 5/28/2021    EGD BIOPSY performed by Marisa Nesbitt MD at [facility] N/A 8/4/2021    EGD ESOPHAGOGASTRODUODENOSCOPY performed by Marisa Nesbitt MD at [facility]       Discharge Medication List as of 8/19/2021 10:24 AM      CONTINUE these medications which have NOT CHANGED    Details   ondansetron (ZOFRAN-ODT) 4 MG disintegrating tablet Take 1 tablet by mouth 3 times daily as needed for Nausea or Vomiting, Disp-21 tablet, R-2Normal      pantoprazole (PROTONIX) 20 MG tablet Take 1 tablet by mouth 2 times daily, Disp-60 tablet, R-3Normal      !! senna-docusate (SENOKOT S) 8.6-50 MG per tablet Take 1 tablet by mouth daily for 14 days, Disp-14 tablet, R-0Normal      !! senna-docusate (SENOKOT S) 8.6-50 MG per tablet Take 1 tablet by mouth daily for 14 days, Disp-14 tablet, R-0Normal       !! - Potential duplicate medications found. Please discuss with provider.           ALLERGIES     Levofloxacin    FAMILY HISTORY       Family History   Problem Relation Age of Onset    Asthma Mother         copd    Heart Disease Mother         chf    Stroke Father           SOCIAL HISTORY       Social History     Socioeconomic History    Marital status:      Spouse name: Not on file    Number of children: Not on file    Years of education: Not on file    Highest education level: Not on file   Occupational History    Not on file   Tobacco Use    Smoking status: Never Smoker    Smokeless tobacco: Never Used   Vaping Use    Vaping Use: Never used   Substance and Sexual Activity    Alcohol use: No     Comment: average \"1-2 times per year    Drug use: No    Sexual activity: Yes   Other Topics Concern    Not on file   Social History Narrative    Not on file     Social Determinants of Health     Financial Resource Strain:     Difficulty of Paying Living Expenses:    Food Insecurity:     Worried About Running Out of Food in the Last Year:     [address] in the Last Year:    Transportation Needs:     Lack of Transportation (Medical):  Lack of Transportation (Non-Medical):    Physical Activity:     Days of Exercise per Week:     Minutes of Exercise per Session:    Stress:     Feeling of Stress :    Social Connections:     Frequency of Communication with Friends and Family:     Frequency of Social Gatherings with Friends and Family:     Attends [employer] Services:     Active Member of Clubs or Organizations:     Attends Club or Organization Meetings:     Marital Status:    Intimate Partner Violence:     Fear of Current or Ex-Partner:     Emotionally Abused:     Physically Abused:     Sexually Abused:        SCREENINGS    Laurens Coma Scale  Eye Opening: Spontaneous  Best Verbal Response: Oriented  Best Motor Response: Obeys commands  Laurens Coma Scale Score: 15          PHYSICAL EXAM    (up to 7 for level 4, 8 or more for level 5)     ED Triage Vitals   BP Temp Temp src Pulse Resp SpO2 Height Weight   08/17/21 1203 08/17/21 1203 -- 08/17/21 1203 08/17/21 1204 08/17/21 1203 -- --   (!) 152/105 97.9 °F (36.6 °C)  109 18 98 %         Physical Exam  Vitals reviewed. Constitutional:       Appearance: She is not ill-appearing or toxic-appearing. HENT:      Head: Normocephalic and atraumatic. Mouth/Throat:      Pharynx: No pharyngeal swelling or oropharyngeal exudate. Eyes:      Extraocular Movements: Extraocular movements intact. Pupils: Pupils are equal, round, and reactive to light. Cardiovascular:      Rate and Rhythm: Tachycardia present. Heart sounds: No friction rub. No gallop. Pulmonary:      Effort: Pulmonary effort is normal. No respiratory distress. Breath sounds: Normal breath sounds. Abdominal:      Palpations: Abdomen is soft. Tenderness: There is abdominal tenderness in the right lower quadrant, suprapubic area and left lower quadrant. There is guarding. Skin:     General: Skin is warm.       Capillary Refill: Capillary refill takes less than 2 seconds. Neurological:      General: No focal deficit present. Mental Status: She is alert and oriented to person, place, and time. DIAGNOSTIC RESULTS     Labs Reviewed   COMPREHENSIVE METABOLIC PANEL W/ REFLEX TO MG FOR LOW K - Abnormal; Notable for the following components:       Result Value    Sodium 132 (*)     Chloride 94 (*)     CO2 19 (*)     Glucose 129 (*)     Total Bilirubin 1.5 (*)     Anion Gap 19 (*)     All other components within normal limits   CBC WITH AUTO DIFFERENTIAL - Abnormal; Notable for the following components:    RBC 5.54 (*)     Hemoglobin 16.1 (*)     Hematocrit 49.6 (*)     MPV 12.5 (*)     Segs Relative 72.6 (*)     Lymphocytes % 17.9 (*)     Monocytes % 7.8 (*)     All other components within normal limits   LIPASE - Abnormal; Notable for the following components:    Lipase 470 (*)     All other components within normal limits   LIPASE - Abnormal; Notable for the following components:    Lipase 282 (*)     All other components within normal limits   CBC WITH AUTO DIFFERENTIAL - Abnormal; Notable for the following components:    MCHC 31.5 (*)     MPV 12.3 (*)     Segs Relative 71.7 (*)     Lymphocytes % 18.4 (*)     Monocytes % 8.2 (*)     All other components within normal limits   CBC WITH AUTO DIFFERENTIAL - Abnormal; Notable for the following components:    WBC 3.9 (*)     Platelets 782 (*)     MPV 12.5 (*)     Segs Relative 73.4 (*)     Lymphocytes % 16.3 (*)     Monocytes % 7.7 (*)     All other components within normal limits   TROPONIN   LACTIC ACID, PLASMA   BASIC METABOLIC PANEL W/ REFLEX TO MG FOR LOW K   BASIC METABOLIC PANEL W/ REFLEX TO MG FOR LOW K          RADIOLOGY:     Non-plain film images such as CT, Ultrasound and MRI are read by the radiologist. Plain radiographic images are visualized and preliminarily interpreted by the emergency physician.        Interpretation per the Radiologist below, if available at the time of this note:    CT ABDOMEN PELVIS W IV CONTRAST Additional Contrast? None   Final Result   Suggestion of mild mural thickening of the sigmoid colon (especially when   compared to the previous exam), with mild surrounding fat stranding, most   compatible with nonspecific segmental colitis. Status post recent sleeve gastrectomy, with no complications identified.                ED BEDSIDE ULTRASOUND:   Performed by ED Physician Raquel Key MD       LABS:  Labs Reviewed   COMPREHENSIVE METABOLIC PANEL W/ REFLEX TO MG FOR LOW K - Abnormal; Notable for the following components:       Result Value    Sodium 132 (*)     Chloride 94 (*)     CO2 19 (*)     Glucose 129 (*)     Total Bilirubin 1.5 (*)     Anion Gap 19 (*)     All other components within normal limits   CBC WITH AUTO DIFFERENTIAL - Abnormal; Notable for the following components:    RBC 5.54 (*)     Hemoglobin 16.1 (*)     Hematocrit 49.6 (*)     MPV 12.5 (*)     Segs Relative 72.6 (*)     Lymphocytes % 17.9 (*)     Monocytes % 7.8 (*)     All other components within normal limits   LIPASE - Abnormal; Notable for the following components:    Lipase 470 (*)     All other components within normal limits   LIPASE - Abnormal; Notable for the following components:    Lipase 282 (*)     All other components within normal limits   CBC WITH AUTO DIFFERENTIAL - Abnormal; Notable for the following components:    MCHC 31.5 (*)     MPV 12.3 (*)     Segs Relative 71.7 (*)     Lymphocytes % 18.4 (*)     Monocytes % 8.2 (*)     All other components within normal limits   CBC WITH AUTO DIFFERENTIAL - Abnormal; Notable for the following components:    WBC 3.9 (*)     Platelets 614 (*)     MPV 12.5 (*)     Segs Relative 73.4 (*)     Lymphocytes % 16.3 (*)     Monocytes % 7.7 (*)     All other components within normal limits   TROPONIN   LACTIC ACID, PLASMA   BASIC METABOLIC PANEL W/ REFLEX TO MG FOR LOW K   BASIC METABOLIC PANEL W/ REFLEX TO MG FOR LOW K All other labs were within normal range or not returned as of this dictation. EMERGENCY DEPARTMENT COURSE and DIFFERENTIAL DIAGNOSIS/MDM:   Vitals:    Vitals:    08/18/21 1508 08/18/21 2006 08/19/21 0446 08/19/21 0724   BP: 119/74 (!) 107/54 112/64 109/61   Pulse: 82 71 78 71   Resp: 19 18 16 16   Temp: 98.1 °F (36.7 °C) 98.2 °F (36.8 °C) 98.2 °F (36.8 °C) 98.2 °F (36.8 °C)   TempSrc:  Oral Oral Oral   SpO2: 100% 100% 100% 100%   Weight:       Height:               MDM  Number of Diagnoses or Management Options  Diagnosis management comments: 56-year-old female who is status post gastric tube placement presents complaining of lower abdominal pain. She states that since undergoing gastric sleeve placement she has had some difficulty with oral intake. Probably she took shower this morning and developed severe lower abdominal pain. She does have a history of other abdominal surgeries including cholecystectomy and hysterectomy. She presents the emergency department for evaluation. Does present tachycardic. She is afebrile. Vitals otherwise unremarkable. Labs and imaging obtained. Labs show a lipase of 470. No leukocytosis. Labs otherwise fairly unremarkable. CT of her abdomen does not show any issues with the gastric sleeve. It does show some findings that might be consistent with colitis. She has required multiple pain medication doses in the emergency department. I did consult general surgery and spoke with the surgeon covering. He did recommend admission with IV any biotics and surgical consultation. She was started on Zosyn. She admitted for further evaluation management. Amount and/or Complexity of Data Reviewed  Clinical lab tests: reviewed  Tests in the radiology section of CPT®: reviewed  Decide to obtain previous medical records or to obtain history from someone other than the patient: yes        -  Patient seen and evaluated in the emergency department.   -  Triage and nursing notes reviewed and incorporated. -  Old chart records reviewed and incorporated. -  Work-up included:  See above  -  Results discussed with patient. CONSULTS:  [address] [facility] TO GENERAL SURGERY    PROCEDURES:  None performed unless otherwise noted below     Procedures        FINAL IMPRESSION      1. Abdominal pain, unspecified abdominal location    2. Colitis          DISPOSITION/PLAN   DISPOSITION Admitted 08/17/2021 06:16:49 PM      PATIENT REFERRED TO:  No follow-up provider specified. DISCHARGE MEDICATIONS:  Discharge Medication List as of 8/19/2021 10:24 AM          ED Provider Disposition Time  DISPOSITION Admitted 08/17/2021 06:16:49 PM      Appropriate personal protective equipment was worn during the patient's evaluation. These included surgical, eye protection, surgical mask or in 95 respirator and gloves. The patient was also placed in a surgical mask for the prevention of possible spread of respiratory viral illnesses. The Patient was instructed to read the package inserts with any medication that was prescribed. Major potential reactions and medication interactions were discussed. The Patient understands that there are numerous possible adverse reactions not covered. The patient was also instructed to arrange follow-up with his or her primary care provider for review of any pending labwork or incidental findings on any radiology results that were obtained. All efforts were made to discuss any incidental findings that require further monitoring. Controlled Substances Monitoring:     No flowsheet data found.     (Please note that portions of this note were completed with a voice recognition program.  Efforts were made to edit the dictations but occasionally words are mis-transcribed.)    Tomasz Ferreira MD (electronically signed)  Attending Emergency Physician           Tomasz Ferreira MD  08/21/21 3958

## 2021-08-18 LAB
ANION GAP SERPL CALCULATED.3IONS-SCNC: 15 MMOL/L (ref 4–16)
BASOPHILS ABSOLUTE: 0 K/CU MM
BASOPHILS RELATIVE PERCENT: 0.4 % (ref 0–1)
BUN BLDV-MCNC: 14 MG/DL (ref 6–23)
CALCIUM SERPL-MCNC: 8.9 MG/DL (ref 8.3–10.6)
CHLORIDE BLD-SCNC: 106 MMOL/L (ref 99–110)
CO2: 24 MMOL/L (ref 21–32)
CREAT SERPL-MCNC: 0.7 MG/DL (ref 0.6–1.1)
DIFFERENTIAL TYPE: ABNORMAL
EOSINOPHILS ABSOLUTE: 0.1 K/CU MM
EOSINOPHILS RELATIVE PERCENT: 0.9 % (ref 0–3)
GFR AFRICAN AMERICAN: >60 ML/MIN/1.73M2
GFR NON-AFRICAN AMERICAN: >60 ML/MIN/1.73M2
GLUCOSE BLD-MCNC: 81 MG/DL (ref 70–99)
HCT VFR BLD CALC: 42.2 % (ref 37–47)
HEMOGLOBIN: 13.3 GM/DL (ref 12.5–16)
IMMATURE NEUTROPHIL %: 0.4 % (ref 0–0.43)
LIPASE: 282 IU/L (ref 13–60)
LYMPHOCYTES ABSOLUTE: 1 K/CU MM
LYMPHOCYTES RELATIVE PERCENT: 18.4 % (ref 24–44)
MCH RBC QN AUTO: 29 PG (ref 27–31)
MCHC RBC AUTO-ENTMCNC: 31.5 % (ref 32–36)
MCV RBC AUTO: 91.9 FL (ref 78–100)
MONOCYTES ABSOLUTE: 0.5 K/CU MM
MONOCYTES RELATIVE PERCENT: 8.2 % (ref 0–4)
NUCLEATED RBC %: 0 %
PDW BLD-RTO: 13.8 % (ref 11.7–14.9)
PLATELET # BLD: 146 K/CU MM (ref 140–440)
PMV BLD AUTO: 12.3 FL (ref 7.5–11.1)
POTASSIUM SERPL-SCNC: 3.9 MMOL/L (ref 3.5–5.1)
RBC # BLD: 4.59 M/CU MM (ref 4.2–5.4)
SEGMENTED NEUTROPHILS ABSOLUTE COUNT: 4 K/CU MM
SEGMENTED NEUTROPHILS RELATIVE PERCENT: 71.7 % (ref 36–66)
SODIUM BLD-SCNC: 145 MMOL/L (ref 135–145)
TOTAL IMMATURE NEUTOROPHIL: 0.02 K/CU MM
TOTAL NUCLEATED RBC: 0 K/CU MM
WBC # BLD: 5.5 K/CU MM (ref 4–10.5)

## 2021-08-18 PROCEDURE — 1200000000 HC SEMI PRIVATE

## 2021-08-18 PROCEDURE — 83690 ASSAY OF LIPASE: CPT

## 2021-08-18 PROCEDURE — 96375 TX/PRO/DX INJ NEW DRUG ADDON: CPT

## 2021-08-18 PROCEDURE — 6360000002 HC RX W HCPCS: Performed by: NURSE PRACTITIONER

## 2021-08-18 PROCEDURE — 36415 COLL VENOUS BLD VENIPUNCTURE: CPT

## 2021-08-18 PROCEDURE — 94761 N-INVAS EAR/PLS OXIMETRY MLT: CPT

## 2021-08-18 PROCEDURE — 6370000000 HC RX 637 (ALT 250 FOR IP): Performed by: SURGERY

## 2021-08-18 PROCEDURE — 80048 BASIC METABOLIC PNL TOTAL CA: CPT

## 2021-08-18 PROCEDURE — C9113 INJ PANTOPRAZOLE SODIUM, VIA: HCPCS | Performed by: SURGERY

## 2021-08-18 PROCEDURE — 6360000002 HC RX W HCPCS: Performed by: SURGERY

## 2021-08-18 PROCEDURE — 85025 COMPLETE CBC W/AUTO DIFF WBC: CPT

## 2021-08-18 PROCEDURE — 96366 THER/PROPH/DIAG IV INF ADDON: CPT

## 2021-08-18 PROCEDURE — G0378 HOSPITAL OBSERVATION PER HR: HCPCS

## 2021-08-18 PROCEDURE — 96372 THER/PROPH/DIAG INJ SC/IM: CPT

## 2021-08-18 PROCEDURE — 2580000003 HC RX 258: Performed by: NURSE PRACTITIONER

## 2021-08-18 PROCEDURE — 99231 SBSQ HOSP IP/OBS SF/LOW 25: CPT | Performed by: SURGERY

## 2021-08-18 RX ORDER — PANTOPRAZOLE SODIUM 40 MG/10ML
40 INJECTION, POWDER, LYOPHILIZED, FOR SOLUTION INTRAVENOUS DAILY
Status: DISCONTINUED | OUTPATIENT
Start: 2021-08-18 | End: 2021-08-19 | Stop reason: HOSPADM

## 2021-08-18 RX ADMIN — PIPERACILLIN SODIUM AND TAZOBACTAM SODIUM 3375 MG: 3; .375 INJECTION, POWDER, LYOPHILIZED, FOR SOLUTION INTRAVENOUS at 21:39

## 2021-08-18 RX ADMIN — PIPERACILLIN SODIUM AND TAZOBACTAM SODIUM 3375 MG: 3; .375 INJECTION, POWDER, LYOPHILIZED, FOR SOLUTION INTRAVENOUS at 03:07

## 2021-08-18 RX ADMIN — MAGNESIUM HYDROXIDE 30 ML: 400 SUSPENSION ORAL at 14:47

## 2021-08-18 RX ADMIN — SODIUM CHLORIDE, PRESERVATIVE FREE 10 ML: 5 INJECTION INTRAVENOUS at 21:41

## 2021-08-18 RX ADMIN — SODIUM CHLORIDE, PRESERVATIVE FREE 10 ML: 5 INJECTION INTRAVENOUS at 10:40

## 2021-08-18 RX ADMIN — PIPERACILLIN SODIUM AND TAZOBACTAM SODIUM 3375 MG: 3; .375 INJECTION, POWDER, LYOPHILIZED, FOR SOLUTION INTRAVENOUS at 10:37

## 2021-08-18 RX ADMIN — PANTOPRAZOLE SODIUM 40 MG: 40 INJECTION, POWDER, FOR SOLUTION INTRAVENOUS at 10:37

## 2021-08-18 RX ADMIN — ENOXAPARIN SODIUM 40 MG: 40 INJECTION SUBCUTANEOUS at 10:37

## 2021-08-18 ASSESSMENT — PAIN SCALES - GENERAL
PAINLEVEL_OUTOF10: 2
PAINLEVEL_OUTOF10: 0

## 2021-08-18 NOTE — CARE COORDINATION
LSW spoke with pt due to pt being a readmission and discharge plans. Pt lives with her  and children. Pt is independent of all her ADLs  Pt denies having any DME or HC in the home. Pt has a PCP and can afford her meds. Pt denies any needs.

## 2021-08-18 NOTE — PROGRESS NOTES
GENERAL SURGERY PROGRESS NOTE    Ed Patton is a 49 y.o. female 2 weeks s/p gastric sleeve surgery who presented with lower abdominal pain, concern for colitis. Subjective:  Continues to be pain free this morning. Reports some nausea. Feels she may have been constipated contributing to her symptoms. Objective:    Vitals: VITALS:  /69   Pulse 83   Temp 97.9 °F (36.6 °C) (Oral)   Resp 16   Ht 5' 2\" (1.575 m)   Wt 223 lb 6.4 oz (101.3 kg)   SpO2 97%   BMI 40.86 kg/m²     I/O: No intake/output data recorded. Labs/Imaging Results:   Lab Results   Component Value Date     08/18/2021    K 3.9 08/18/2021     08/18/2021    CO2 24 08/18/2021    BUN 14 08/18/2021    CREATININE 0.7 08/18/2021    GLUCOSE 81 08/18/2021    CALCIUM 8.9 08/18/2021      Lab Results   Component Value Date    WBC 5.5 08/18/2021    HGB 13.3 08/18/2021    HCT 42.2 08/18/2021    MCV 91.9 08/18/2021     08/18/2021       IV Fluids: sodium chloride    sodium chloride Last Rate: 100 mL/hr at 08/17/21 1911    Scheduled Meds:   pantoprazole, 40 mg, Intravenous, Daily    sennosides-docusate sodium, 1 tablet, Oral, Daily    sodium chloride flush, 5-40 mL, Intravenous, 2 times per day    enoxaparin, 40 mg, Subcutaneous, Daily    piperacillin-tazobactam, 3,375 mg, Intravenous, Q8H    Physical Exam:  General: A&O x 3, no distress. HEENT: Anicteric sclerae, MMM. Extremities: No edema bilat LE. Abdomen: Soft, nontender, nondistended. Assessment and Plan:  49 y.o. female with lower abdominal pain, concern for colitis on CT. Improved clinically.     Patient Active Problem List:     Lipoma of right forearm     Morbid obesity ([address]     Status post bariatric surgery     Status post laparoscopic sleeve gastrectomy     Abdominal pain      - continue full liquid diet  - protonix changed to IV to avoid pills  - if tolerating diet without increased pain I would be ok with stopping antibiotics and discharge from a surgical standpoint.  - keep follow up appointment on 9/16/21 with Dr. Reji Alvarez MD

## 2021-08-18 NOTE — PROGRESS NOTES
Hospitalist Progress Note      Name:  Tam Bardales /Age/Sex: 1970  (49 y.o. female)   MRN & CSN:  [id number] & [id number] Admission Date/Time: 2021 11:55 AM   [facility]:  [facility] PCP: No primary care provider on file. Hospital Day: 2    Please call hospitalist cell phone number for acute patient issues: Phone number: [phone]    Assessment and Plan:   Patient is a 64-year-old female who had a recent gastric sleeve surgery presented to the ED with abdominal pain and found to have CT evidence of?colitis. 1. Abdominal pain also with elevated lipase:? Pancreatitis  2. CT evidence of colitis  3. Recent gastric sleeve    Plan:  Abdominal pain. Due to recent gastric sleeve versus pancreatitis since lipase was elevated  CT evidence of colitis: Continue Zosyn  Continue IV PPIs  Continue IV fluids  Dissipate discharge tomorrow pending improvement in abdominal pain        DVT prophylaxis: Eliquis  CODE STATUS: Total support  Patient remains hospitalized due to: IV fluids, IV PPI, pain medications      Subjective. :     Patient was seen and examined today. States she is feeling better but still has pain. No acute events overnight. Patient andria afebrile still vital signs. Objective:   Vitals:   Vitals:    21 0825   BP: 109/69   Pulse: 83   Resp: 16   Temp: 97.9 °F (36.6 °C)   SpO2: 97%         Physical Exam:   GEN: Awake, alert, in no apparent distress awake female, sitting upright in bed in no apparent distress. Appears given age. HEENT: Atraumatic, normocephalic, PERRLA, EOMI  NECK: Supple, no apparent thyromegaly or masses. RESP: Clear lungs to auscultation  CARDIO/VASC: Regular rate and rhythm, normal S1, S2, no murmurs  GI: Abdomen is soft. Tender, bowel sounds present   MSK: No gross joint deformities.   Skin: No significant rashes, skin lesions noted  Neuro: AOx3, cranial nerves grossly intact, no focal motor or sensory deficits   PSYCH: Affect appropriate    Medications:   Medications:    pantoprazole  40 mg Intravenous Daily    sennosides-docusate sodium  1 tablet Oral Daily    sodium chloride flush  5-40 mL Intravenous 2 times per day    enoxaparin  40 mg Subcutaneous Daily    piperacillin-tazobactam  3,375 mg Intravenous Q8H      Infusions:    sodium chloride      sodium chloride 100 mL/hr at 08/17/21 1911     PRN Meds: ondansetron, 4 mg, TID PRN  sodium chloride flush, 5-40 mL, PRN  sodium chloride, 25 mL, PRN  ondansetron, 4 mg, Q8H PRN   Or  ondansetron, 4 mg, Q6H PRN  polyethylene glycol, 17 g, Daily PRN  acetaminophen, 650 mg, Q6H PRN   Or  acetaminophen, 650 mg, Q6H PRN  HYDROmorphone, 0.5 mg, Q4H PRN          Electronically signed by Dinh Dye MD on 8/18/2021 at 10:24 AM

## 2021-08-18 NOTE — CONSULTS
Department of General Surgery   Surgical Service Dr. Amanda Claros   Consult Note    Date of Consult: 8/17/21    Reason for Consult:  Lower abdominal pain, nausea, recent gastric sleeve  Requesting Physician:  Dr. Cathi Calderon:  Lower abdominal pain    History Obtained From:  patient    HISTORY OF PRESENT ILLNESS:    The patient is a 49 y.o. female who presented with abdominal pain starting yesterday. She reports the pain being in her lower abdomen. Yesterday she felt like she needed to have a BM but couldn't. Today pain became worse in the lower abdomen. She has been able to have a BM, one at home and one since coming to the hospital with improvement in symptoms. Pain is cramping \"like labor pains\". She denies blood in her stool or melena. She has never had a colonoscopy in the past.  No similar pain to this in the past.    CT was performed which showed mild colitis. WBC is normal although with left shift. tbil and lipase were somewhat elevated. Mildly tachy on admission. HR was in 90s while I was in the room. Denies pain at the time of my exam.           Past Medical History:    Past Medical History:   Diagnosis Date    COVID-19     \"had + covid test 4/2021- only symptom was headache\"- negative covid test 5/21/2021    H/O echocardiogram 02/25/2021    Mild LVH, AR, TR & MR.    History of kidney stones     last stone: 2006    Wears dentures     full upper plate    Wears glasses     to read       Past Surgical History:    Past Surgical History:   Procedure Laterality Date    [facility]    CYSTOSCOPY  2007?    stone manipulation    DILATION AND CURETTAGE OF UTERUS  1986    HYSTERECTOMY  2007    LITHOTRIPSY      ( not sure if had this done-per pt)    SLEEVE GASTRECTOMY N/A 8/4/2021    GASTRECTOMY SLEEVE LAPAROSCOPIC ROBOTIC  POSS HIATAL HERNIA REPAIR performed by Bam Florez MD at [facility]. N/A 5/28/2021    EGD BIOPSY performed by Vaughn Castillo MD at [address] [place] [address] N/A 8/4/2021    EGD ESOPHAGOGASTRODUODENOSCOPY performed by Vaughn Castillo MD at [facility] OR       Current Medications:   Current Facility-Administered Medications   Medication Dose Route Frequency Provider Last Rate Last Admin    ondansetron (ZOFRAN-ODT) disintegrating tablet 4 mg  4 mg Oral TID PRN Marcin Akaeze, APRN - CNP        pantoprazole (PROTONIX) tablet 20 mg  20 mg Oral BID Marcin Akaeze, APRN - CNP        sennosides-docusate sodium (SENOKOT-S) 8.6-50 MG tablet 1 tablet  1 tablet Oral Daily Marcin Akaeze, APRN - CNP        sodium chloride flush 0.9 % injection 5-40 mL  5-40 mL Intravenous 2 times per day Marcin Akaeze, APRN - CNP        sodium chloride flush 0.9 % injection 5-40 mL  5-40 mL Intravenous PRN Marcin Akaeze, APRN - CNP        0.9 % sodium chloride infusion  25 mL Intravenous PRN Marcin Akaeze, APRN - CNP        [START ON 8/18/2021] enoxaparin (LOVENOX) injection 40 mg  40 mg Subcutaneous Daily Marcin Akaeze, APRN - CNP        ondansetron (ZOFRAN-ODT) disintegrating tablet 4 mg  4 mg Oral Q8H PRN Marcin Akaeze, APRN - CNP        Or    ondansetron (ZOFRAN) injection 4 mg  4 mg Intravenous Q6H PRN Marcin Akaeze, APRN - CNP        polyethylene glycol (GLYCOLAX) packet 17 g  17 g Oral Daily PRN Marcin Akaeze, APRN - CNP        acetaminophen (TYLENOL) tablet 650 mg  650 mg Oral Q6H PRN Marcin Akaeze, APRN - CNP        Or    acetaminophen (TYLENOL) suppository 650 mg  650 mg Rectal Q6H PRN Marcin Akaeze, APRN - CNP        0.9 % sodium chloride infusion   Intravenous Continuous Marcin Akaeze, APRN -  mL/hr at 08/17/21 1911 New Bag at 08/17/21 1911    HYDROmorphone (DILAUDID) injection 0.5 mg  0.5 mg Intravenous Q4H PRN Marcin Akaeze, APRN - CNP        piperacillin-tazobactam (ZOSYN) 3,375 mg in dextrose 5 % 50 mL IVPB extended infusion (mini-bag)  3,375 mg Intravenous Q8H Marcin Alarcon, APRN - CNP         Current Outpatient Medications   Medication Sig Dispense Refill    ondansetron (ZOFRAN-ODT) 4 MG disintegrating tablet Take 1 tablet by mouth 3 times daily as needed for Nausea or Vomiting 21 tablet 2    pantoprazole (PROTONIX) 20 MG tablet Take 1 tablet by mouth 2 times daily 60 tablet 3    senna-docusate (SENOKOT S) 8.6-50 MG per tablet Take 1 tablet by mouth daily for 14 days 14 tablet 0    senna-docusate (SENOKOT S) 8.6-50 MG per tablet Take 1 tablet by mouth daily for 14 days 14 tablet 0       Allergies:  Levofloxacin    Social History:   Social History     Socioeconomic History    Marital status:      Spouse name: Not on file    Number of children: Not on file    Years of education: Not on file    Highest education level: Not on file   Occupational History    Not on file   Tobacco Use    Smoking status: Never Smoker    Smokeless tobacco: Never Used   Vaping Use    Vaping Use: Never used   Substance and Sexual Activity    Alcohol use: No     Comment: average \"1-2 times per year    Drug use: No    Sexual activity: Yes   Other Topics Concern    Not on file   Social History Narrative    Not on file     Social Determinants of Health     Financial Resource Strain:     Difficulty of Paying Living Expenses:    Food Insecurity:     Worried About Running Out of Food in the Last Year:     Ran Out of Food in the Last Year:    Transportation Needs:     Lack of Transportation (Medical):      Lack of Transportation (Non-Medical):    Physical Activity:     Days of Exercise per Week:     Minutes of Exercise per Session:    Stress:     Feeling of Stress :    Social Connections:     Frequency of Communication with Friends and Family:     Frequency of Social Gatherings with Friends and Family:     Attends [employer] Services:     Active Member of Clubs or Organizations:     Attends Club or Organization Meetings:     Marital Status:    Intimate Partner Violence:     Fear of Current or Ex-Partner:     Emotionally Abused:     Physically Abused:     Sexually Abused:        Family History:   Family History   Problem Relation Age of Onset    Asthma Mother         copd    Heart Disease Mother         chf    Stroke Father        REVIEW OFSYSTEMS:    Review of Systems   Constitutional: Negative for chills. Negative for fever. HENT: Negative for congestion. Negative for rhinorrhea. Respiratory: Negative for cough. Negative for shortness of breath. Negative for wheezing. Cardiovascular: Negative for chest pain. Gastrointestinal: as per HPI  Genitourinary: Negative for difficulty urinating. Neurological: Negative for dizziness, syncope and numbness. Hematological: Does not bruise/bleed easily. PHYSICAL EXAM:  Vitals:    08/17/21 1740 08/17/21 1910 08/17/21 2000 08/17/21 2030   BP: 138/87   125/84   Pulse: 103 92  94   Resp: 17 14     Temp:       SpO2: 99% 97%  96%   Weight:  230 lb (104.3 kg)     Height:   5' 2\" (1.575 m)        Physical Exam  General: awake, alert, in no acute distress  HEENT: mucous membranes moist  Respiratory: normal effort, no wheezes appreciated  CV: appears well perfused, regular rate and rhythm  Abdomen: Soft, non-tender, non-distended. No guarding or rebound tenderness. Port site incisions with mild ecchymosis, dermabond intact  Skin: warm and dry  Extremities: atraumatic  Neuro: no focal deficits noted  Psych: mood normal        DATA:    Lab Results   Component Value Date    WBC 6.6 08/17/2021    HGB 16.1 (H) 08/17/2021    HCT 49.6 (H) 08/17/2021    MCV 89.5 08/17/2021     08/17/2021     Lab Results   Component Value Date     08/17/2021    K 3.6 08/17/2021    CL 94 08/17/2021    CO2 19 08/17/2021    BUN 20 08/17/2021    CREATININE 0.9 08/17/2021    GLUCOSE 129 08/17/2021    CALCIUM 9.6 08/17/2021          IMPRESSION:    49 y.o. female with lower abdominal pain and nausea 2 weeks s/p gastric sleeve.   Imaging suggests unrelated sigmoid colitis--although this is subtle on CT.   Pain improving since presentation to the hospital.    Patient Active Problem List:     Lipoma of right forearm     Morbid obesity ([address]     Status post bariatric surgery     Status post laparoscopic sleeve gastrectomy     Abdominal pain        PLAN:  - no indication for surgical intervention at present  - recommend bowel rest and antibiotics  - consider advancing diet at tolerated starting tomorrow if pain remains improved  - will follow        Electronically signed by Jesusita Hector MD on 8/17/2021 at 8:59 PM

## 2021-08-19 VITALS
SYSTOLIC BLOOD PRESSURE: 109 MMHG | RESPIRATION RATE: 16 BRPM | OXYGEN SATURATION: 100 % | HEART RATE: 71 BPM | WEIGHT: 223.4 LBS | HEIGHT: 62 IN | TEMPERATURE: 98.2 F | DIASTOLIC BLOOD PRESSURE: 61 MMHG | BODY MASS INDEX: 41.11 KG/M2

## 2021-08-19 PROBLEM — R10.9 ABDOMINAL PAIN: Status: RESOLVED | Noted: 2021-08-17 | Resolved: 2021-08-19

## 2021-08-19 LAB
ANION GAP SERPL CALCULATED.3IONS-SCNC: 12 MMOL/L (ref 4–16)
BASOPHILS ABSOLUTE: 0 K/CU MM
BASOPHILS RELATIVE PERCENT: 0.5 % (ref 0–1)
BUN BLDV-MCNC: 10 MG/DL (ref 6–23)
CALCIUM SERPL-MCNC: 8.8 MG/DL (ref 8.3–10.6)
CHLORIDE BLD-SCNC: 105 MMOL/L (ref 99–110)
CO2: 26 MMOL/L (ref 21–32)
CREAT SERPL-MCNC: 0.7 MG/DL (ref 0.6–1.1)
DIFFERENTIAL TYPE: ABNORMAL
EOSINOPHILS ABSOLUTE: 0.1 K/CU MM
EOSINOPHILS RELATIVE PERCENT: 1.8 % (ref 0–3)
GFR AFRICAN AMERICAN: >60 ML/MIN/1.73M2
GFR NON-AFRICAN AMERICAN: >60 ML/MIN/1.73M2
GLUCOSE BLD-MCNC: 83 MG/DL (ref 70–99)
HCT VFR BLD CALC: 41.2 % (ref 37–47)
HEMOGLOBIN: 13.2 GM/DL (ref 12.5–16)
IMMATURE NEUTROPHIL %: 0.3 % (ref 0–0.43)
LYMPHOCYTES ABSOLUTE: 0.6 K/CU MM
LYMPHOCYTES RELATIVE PERCENT: 16.3 % (ref 24–44)
MCH RBC QN AUTO: 29.3 PG (ref 27–31)
MCHC RBC AUTO-ENTMCNC: 32 % (ref 32–36)
MCV RBC AUTO: 91.6 FL (ref 78–100)
MONOCYTES ABSOLUTE: 0.3 K/CU MM
MONOCYTES RELATIVE PERCENT: 7.7 % (ref 0–4)
NUCLEATED RBC %: 0 %
PDW BLD-RTO: 13.9 % (ref 11.7–14.9)
PLATELET # BLD: 133 K/CU MM (ref 140–440)
PMV BLD AUTO: 12.5 FL (ref 7.5–11.1)
POTASSIUM SERPL-SCNC: 3.6 MMOL/L (ref 3.5–5.1)
RBC # BLD: 4.5 M/CU MM (ref 4.2–5.4)
SEGMENTED NEUTROPHILS ABSOLUTE COUNT: 2.9 K/CU MM
SEGMENTED NEUTROPHILS RELATIVE PERCENT: 73.4 % (ref 36–66)
SODIUM BLD-SCNC: 143 MMOL/L (ref 135–145)
TOTAL IMMATURE NEUTOROPHIL: 0.01 K/CU MM
TOTAL NUCLEATED RBC: 0 K/CU MM
WBC # BLD: 3.9 K/CU MM (ref 4–10.5)

## 2021-08-19 PROCEDURE — 6360000002 HC RX W HCPCS: Performed by: NURSE PRACTITIONER

## 2021-08-19 PROCEDURE — C9113 INJ PANTOPRAZOLE SODIUM, VIA: HCPCS | Performed by: SURGERY

## 2021-08-19 PROCEDURE — 2580000003 HC RX 258: Performed by: NURSE PRACTITIONER

## 2021-08-19 PROCEDURE — 80048 BASIC METABOLIC PNL TOTAL CA: CPT

## 2021-08-19 PROCEDURE — 85025 COMPLETE CBC W/AUTO DIFF WBC: CPT

## 2021-08-19 PROCEDURE — 6360000002 HC RX W HCPCS: Performed by: SURGERY

## 2021-08-19 PROCEDURE — 99231 SBSQ HOSP IP/OBS SF/LOW 25: CPT | Performed by: SURGERY

## 2021-08-19 PROCEDURE — G0378 HOSPITAL OBSERVATION PER HR: HCPCS

## 2021-08-19 PROCEDURE — 96376 TX/PRO/DX INJ SAME DRUG ADON: CPT

## 2021-08-19 PROCEDURE — 36415 COLL VENOUS BLD VENIPUNCTURE: CPT

## 2021-08-19 PROCEDURE — 96366 THER/PROPH/DIAG IV INF ADDON: CPT

## 2021-08-19 RX ADMIN — PIPERACILLIN SODIUM AND TAZOBACTAM SODIUM 3375 MG: 3; .375 INJECTION, POWDER, LYOPHILIZED, FOR SOLUTION INTRAVENOUS at 03:54

## 2021-08-19 RX ADMIN — PANTOPRAZOLE SODIUM 40 MG: 40 INJECTION, POWDER, FOR SOLUTION INTRAVENOUS at 07:31

## 2021-08-19 NOTE — PROGRESS NOTES
GENERAL SURGERY PROGRESS NOTE    Carly Arredondo is a 49 y.o. female 2 weeks s/p gastric sleeve surgery who presented with lower abdominal pain, concern for colitis. Subjective:  Denies pain or nausea this morning. Feels ready for home. Objective:    Vitals: VITALS:  /61   Pulse 71   Temp 98.2 °F (36.8 °C) (Oral)   Resp 16   Ht 5' 2\" (1.575 m)   Wt 223 lb 6.4 oz (101.3 kg)   SpO2 100%   BMI 40.86 kg/m²     I/O: 08/18 0701 - 08/19 0700  In: 360 [P.O.:360]  Out: -     Labs/Imaging Results:   Lab Results   Component Value Date     08/19/2021    K 3.6 08/19/2021     08/19/2021    CO2 26 08/19/2021    BUN 10 08/19/2021    CREATININE 0.7 08/19/2021    GLUCOSE 83 08/19/2021    CALCIUM 8.8 08/19/2021      Lab Results   Component Value Date    WBC 3.9 (L) 08/19/2021    HGB 13.2 08/19/2021    HCT 41.2 08/19/2021    MCV 91.6 08/19/2021     (L) 08/19/2021       IV Fluids: sodium chloride    sodium chloride Last Rate: 100 mL/hr at 08/17/21 1911    Scheduled Meds:   pantoprazole, 40 mg, Intravenous, Daily    magnesium hydroxide, 30 mL, Oral, Daily    sennosides-docusate sodium, 1 tablet, Oral, Daily    sodium chloride flush, 5-40 mL, Intravenous, 2 times per day    enoxaparin, 40 mg, Subcutaneous, Daily    piperacillin-tazobactam, 3,375 mg, Intravenous, Q8H    Physical Exam:  General: A&O x 3, no distress. HEENT: Anicteric sclerae, MMM. Extremities: No edema bilat LE. Abdomen: Soft, nontender, nondistended. Assessment and Plan:  49 y.o. female with lower abdominal pain, concern for colitis on CT. Improved clinically.     Patient Active Problem List:     Lipoma of right forearm     Morbid obesity ([address]     Status post bariatric surgery     Status post laparoscopic sleeve gastrectomy     Abdominal pain      - continue full liquid diet  - ok for discharge from a surgical perspective  - keep follow up appointment on 9/16/21 with Dr. Amna Sampson, MD

## 2021-08-19 NOTE — DISCHARGE SUMMARY
or Vomiting             pantoprazole (PROTONIX) 20 MG tablet  Take 1 tablet by mouth 2 times daily             senna-docusate (SENOKOT S) 8.6-50 MG per tablet  Take 1 tablet by mouth daily for 14 days             senna-docusate (SENOKOT S) 8.6-50 MG per tablet  Take 1 tablet by mouth daily for 14 days                 Objective Findings at Discharge:   /61   Pulse 71   Temp 98.2 °F (36.8 °C) (Oral)   Resp 16   Ht 5' 2\" (1.575 m)   Wt 223 lb 6.4 oz (101.3 kg)   SpO2 100%   BMI 40.86 kg/m²            PHYSICAL EXAM   GEN Awake female, sitting upright in bed in no apparent distress. Appears given age. EYES Pupils are equally round. No scleral erythema, discharge, or conjunctivitis. HENT Mucous membranes are moist.   NECK Supple, no apparent thyromegaly or masses. RESP Clear to auscultation, no wheezes, rales or rhonchi. Symmetric chest movement while on room air. CARDIO/VASC S1/S2 auscultated. Regular rate without appreciable murmurs, rubs, or gallops. No JVD or carotid bruits. Peripheral pulses equal bilaterally and palpable. No peripheral edema. GI Abdomen is soft without significant tenderness, masses, or guarding. Prior surgical incisions healing well   No costovertebral angle tenderness. HEME/LYMPH No petechiae or ecchymoses. MSK No gross joint deformities. SKIN Normal coloration, warm, dry. NEURO Cranial nerves appear grossly intact, normal speech  PSYCH Awake, alert, oriented x 4. Affect appropriate.     BMP/CBC  Recent Labs     08/17/21  1216 08/18/21  0637 08/19/21  0621   * 145 143   K 3.6 3.9 3.6   CL 94* 106 105   CO2 19* 24 26   BUN 20 14 10   CREATININE 0.9 0.7 0.7   WBC 6.6 5.5 3.9*   HCT 49.6* 42.2 41.2    146 133*       IMAGING:  CT A/P:  Suggestion of mild mural thickening of the sigmoid colon (especially when   compared to the previous exam), with mild surrounding fat stranding, most   compatible with nonspecific segmental colitis.       Status post recent sleeve gastrectomy, with no complications identified.        Discharge Time of 35 minutes    Electronically signed by Venkat Lindsay MD on 8/19/2021 at 11:27 AM

## 2021-08-24 ENCOUNTER — HOSPITAL ENCOUNTER (INPATIENT)
Age: 51
LOS: 1 days | Discharge: HOME HEALTH | DRG: 441 | End: 2021-08-28
Attending: EMERGENCY MEDICINE | Admitting: STUDENT IN AN ORGANIZED HEALTH CARE EDUCATION/TRAINING PROGRAM
Payer: COMMERCIAL

## 2021-08-24 ENCOUNTER — OFFICE VISIT (OUTPATIENT)
Dept: BARIATRICS/WEIGHT MGMT | Age: 51
End: 2021-08-24

## 2021-08-24 VITALS — HEART RATE: 126 BPM | WEIGHT: 218.8 LBS | BODY MASS INDEX: 40.27 KG/M2 | HEIGHT: 62 IN | OXYGEN SATURATION: 100 %

## 2021-08-24 DIAGNOSIS — I81 PORTAL VEIN THROMBOSIS: ICD-10-CM

## 2021-08-24 DIAGNOSIS — E87.29 HIGH ANION GAP METABOLIC ACIDOSIS: ICD-10-CM

## 2021-08-24 DIAGNOSIS — R11.2 INTRACTABLE VOMITING WITH NAUSEA, UNSPECIFIED VOMITING TYPE: Primary | ICD-10-CM

## 2021-08-24 DIAGNOSIS — Z98.84 S/P BARIATRIC SURGERY: Primary | ICD-10-CM

## 2021-08-24 LAB
ALBUMIN SERPL-MCNC: 4 GM/DL (ref 3.4–5)
ALP BLD-CCNC: 82 IU/L (ref 40–129)
ALT SERPL-CCNC: 31 U/L (ref 10–40)
ANION GAP SERPL CALCULATED.3IONS-SCNC: 20 MMOL/L (ref 4–16)
AST SERPL-CCNC: 18 IU/L (ref 15–37)
BASOPHILS ABSOLUTE: 0 K/CU MM
BASOPHILS RELATIVE PERCENT: 0.4 % (ref 0–1)
BETA-HYDROXYBUTYRATE: >20.8 MG/DL (ref 0–3)
BILIRUB SERPL-MCNC: 1.2 MG/DL (ref 0–1)
BUN BLDV-MCNC: 9 MG/DL (ref 6–23)
CALCIUM SERPL-MCNC: 9.6 MG/DL (ref 8.3–10.6)
CHLORIDE BLD-SCNC: 98 MMOL/L (ref 99–110)
CO2: 21 MMOL/L (ref 21–32)
CREAT SERPL-MCNC: 0.7 MG/DL (ref 0.6–1.1)
DIFFERENTIAL TYPE: ABNORMAL
EOSINOPHILS ABSOLUTE: 0 K/CU MM
EOSINOPHILS RELATIVE PERCENT: 0.2 % (ref 0–3)
GFR AFRICAN AMERICAN: >60 ML/MIN/1.73M2
GFR NON-AFRICAN AMERICAN: >60 ML/MIN/1.73M2
GLUCOSE BLD-MCNC: 102 MG/DL (ref 70–99)
HCT VFR BLD CALC: 45.7 % (ref 37–47)
HEMOGLOBIN: 14.7 GM/DL (ref 12.5–16)
IMMATURE NEUTROPHIL %: 0.4 % (ref 0–0.43)
LACTATE: 1 MMOL/L (ref 0.4–2)
LIPASE: 169 IU/L (ref 13–60)
LYMPHOCYTES ABSOLUTE: 0.6 K/CU MM
LYMPHOCYTES RELATIVE PERCENT: 11.6 % (ref 24–44)
MCH RBC QN AUTO: 29 PG (ref 27–31)
MCHC RBC AUTO-ENTMCNC: 32.2 % (ref 32–36)
MCV RBC AUTO: 90.1 FL (ref 78–100)
MONOCYTES ABSOLUTE: 0.6 K/CU MM
MONOCYTES RELATIVE PERCENT: 11.8 % (ref 0–4)
NUCLEATED RBC %: 0 %
PDW BLD-RTO: 13.8 % (ref 11.7–14.9)
PLATELET # BLD: 150 K/CU MM (ref 140–440)
PMV BLD AUTO: 12.6 FL (ref 7.5–11.1)
POTASSIUM SERPL-SCNC: 4.1 MMOL/L (ref 3.5–5.1)
RBC # BLD: 5.07 M/CU MM (ref 4.2–5.4)
SEGMENTED NEUTROPHILS ABSOLUTE COUNT: 4 K/CU MM
SEGMENTED NEUTROPHILS RELATIVE PERCENT: 75.6 % (ref 36–66)
SODIUM BLD-SCNC: 139 MMOL/L (ref 135–145)
TOTAL IMMATURE NEUTOROPHIL: 0.02 K/CU MM
TOTAL NUCLEATED RBC: 0 K/CU MM
TOTAL PROTEIN: 7.3 GM/DL (ref 6.4–8.2)
WBC # BLD: 5.3 K/CU MM (ref 4–10.5)

## 2021-08-24 PROCEDURE — 6360000002 HC RX W HCPCS: Performed by: EMERGENCY MEDICINE

## 2021-08-24 PROCEDURE — 83690 ASSAY OF LIPASE: CPT

## 2021-08-24 PROCEDURE — 36415 COLL VENOUS BLD VENIPUNCTURE: CPT

## 2021-08-24 PROCEDURE — 82010 KETONE BODYS QUAN: CPT

## 2021-08-24 PROCEDURE — 2580000003 HC RX 258: Performed by: EMERGENCY MEDICINE

## 2021-08-24 PROCEDURE — 85025 COMPLETE CBC W/AUTO DIFF WBC: CPT

## 2021-08-24 PROCEDURE — 96374 THER/PROPH/DIAG INJ IV PUSH: CPT

## 2021-08-24 PROCEDURE — 80053 COMPREHEN METABOLIC PANEL: CPT

## 2021-08-24 PROCEDURE — 99999 PR OFFICE/OUTPT VISIT,PROCEDURE ONLY: CPT | Performed by: SURGERY

## 2021-08-24 PROCEDURE — 96375 TX/PRO/DX INJ NEW DRUG ADDON: CPT

## 2021-08-24 PROCEDURE — 83605 ASSAY OF LACTIC ACID: CPT

## 2021-08-24 PROCEDURE — 99285 EMERGENCY DEPT VISIT HI MDM: CPT

## 2021-08-24 RX ORDER — 0.9 % SODIUM CHLORIDE 0.9 %
1000 INTRAVENOUS SOLUTION INTRAVENOUS ONCE
Status: COMPLETED | OUTPATIENT
Start: 2021-08-24 | End: 2021-08-25

## 2021-08-24 RX ORDER — ONDANSETRON 2 MG/ML
4 INJECTION INTRAMUSCULAR; INTRAVENOUS ONCE
Status: COMPLETED | OUTPATIENT
Start: 2021-08-24 | End: 2021-08-24

## 2021-08-24 RX ORDER — MORPHINE SULFATE 4 MG/ML
4 INJECTION, SOLUTION INTRAMUSCULAR; INTRAVENOUS ONCE
Status: COMPLETED | OUTPATIENT
Start: 2021-08-24 | End: 2021-08-24

## 2021-08-24 RX ADMIN — ONDANSETRON 4 MG: 2 INJECTION INTRAMUSCULAR; INTRAVENOUS at 23:07

## 2021-08-24 RX ADMIN — MORPHINE SULFATE 4 MG: 4 INJECTION INTRAVENOUS at 23:07

## 2021-08-24 RX ADMIN — SODIUM CHLORIDE 1000 ML: 9 INJECTION, SOLUTION INTRAVENOUS at 23:07

## 2021-08-24 ASSESSMENT — ENCOUNTER SYMPTOMS
ALLERGIC/IMMUNOLOGIC NEGATIVE: 1
ABDOMINAL PAIN: 1
EYES NEGATIVE: 1
CONSTIPATION: 1
VOMITING: 1
NAUSEA: 1
RESPIRATORY NEGATIVE: 1

## 2021-08-24 ASSESSMENT — PAIN DESCRIPTION - PAIN TYPE
TYPE: ACUTE PAIN
TYPE: ACUTE PAIN

## 2021-08-24 ASSESSMENT — PAIN DESCRIPTION - LOCATION
LOCATION: ABDOMEN
LOCATION: ABDOMEN

## 2021-08-24 ASSESSMENT — PAIN SCALES - GENERAL
PAINLEVEL_OUTOF10: 8
PAINLEVEL_OUTOF10: 7

## 2021-08-24 NOTE — PROGRESS NOTES
BARIATRIC SURGERY OFFICE PROGRESS NOTE    SUBJECTIVE:    Patient presenting today referred from No primary care provider on file. , for   Chief Complaint   Patient presents with   [address] from Hospital     2nd p/o s/g 8/4/21   . Vitals:    08/24/21 1606   Pulse: 126   SpO2: 100%        BMI: Body mass index is 40.02 kg/m². Weight History: Wt Readings from Last 3 Encounters:   08/24/21 218 lb 12.8 oz (99.2 kg)   08/17/21 223 lb 6.4 oz (101.3 kg)   08/13/21 226 lb 12.8 oz (102.9 kg)        If within 30 days of bariatric surgery date, have you been to the ED: Yes      Paris Ram is a 49 y.o. female presenting in second bariatric POST-OP visit. Total weight loss/gain: -8 lbs since last visit, -24.7 lbs since surgery, -40.4 lbs since starting program    Thoroughly reviewed the patient's medical history, family history, social history and review of systems with the patient today in the office. Please see medical record for pertinent positives. Changes in health since last visit: Went to the ED for lower abdominal pain. Was found to have sigmoid colitis and admitted for IV Abx. Pt was started on and improved. Pt was d/c'd several days later after tolerating diet but then started to develop lower abdominal pain and N/V again. She is here to see me in clinic today.      Pt tracking calories: N/A given current N/V    Pt exercising: As above      Past Medical History:   Diagnosis Date    COVID-19     \"had + covid test 4/2021- only symptom was headache\"- negative covid test 5/21/2021    H/O echocardiogram 02/25/2021    Mild LVH, AR, TR & MR.    History of kidney stones     last stone: 2006    Wears dentures     full upper plate    Wears glasses     to read        Patient Active Problem List   Diagnosis    Lipoma of right forearm    Morbid obesity ([address]    Status post bariatric surgery    Status post laparoscopic sleeve gastrectomy       Past Surgical History:   Procedure Laterality Date  [facility]    CYSTOSCOPY  2007?    stone manipulation    DILATION AND CURETTAGE OF UTERUS  1986    HYSTERECTOMY  2007    LITHOTRIPSY      ( not sure if had this done-per pt)    SLEEVE GASTRECTOMY N/A 8/4/2021    GASTRECTOMY SLEEVE LAPAROSCOPIC ROBOTIC  POSS HIATAL HERNIA REPAIR performed by Analy Webster MD at [facility] 5/28/2021    EGD BIOPSY performed by Analy Webster MD at [facility] N/A 8/4/2021    EGD ESOPHAGOGASTRODUODENOSCOPY performed by Analy Webster MD at [facility] OR       Current Outpatient Medications   Medication Sig Dispense Refill    pantoprazole (PROTONIX) 20 MG tablet Take 1 tablet by mouth 2 times daily 60 tablet 3    ondansetron (ZOFRAN-ODT) 4 MG disintegrating tablet Take 1 tablet by mouth 3 times daily as needed for Nausea or Vomiting 21 tablet 2     No current facility-administered medications for this visit. Allergies   Allergen Reactions    Levofloxacin Shortness Of Breath         Review of Systems   Constitutional: Positive for activity change and fatigue. Negative for fever. HENT: Negative. Eyes: Negative. Respiratory: Negative. Cardiovascular: Negative. Gastrointestinal: Positive for abdominal pain (lower), constipation, nausea and vomiting. Endocrine: Negative. Genitourinary: Negative. Musculoskeletal: Negative. Skin: Negative. Allergic/Immunologic: Negative. Neurological: Negative. Hematological: Negative. Psychiatric/Behavioral: Negative. OBJECTIVE:    Pulse 126   Ht 5' 2\" (1.575 m)   Wt 218 lb 12.8 oz (99.2 kg)   SpO2 100%   BMI 40.02 kg/m²      Physical Exam  Vitals reviewed. Constitutional:       General: She is not in acute distress. Appearance: She is obese. She is ill-appearing (emesis and dry heaving). She is not toxic-appearing or diaphoretic.    HENT:      Head: Normocephalic and atraumatic. Right Ear: External ear normal.      Left Ear: External ear normal.      Nose: Nose normal.   Eyes:      General:         Right eye: No discharge. Left eye: No discharge. Extraocular Movements: Extraocular movements intact. Cardiovascular:      Rate and Rhythm: Normal rate. Pulmonary:      Effort: Pulmonary effort is normal.   Abdominal:      Palpations: Abdomen is soft. Tenderness: There is no abdominal tenderness. There is no guarding or rebound. Comments: Incisions are healing appropriately     Musculoskeletal:         General: No swelling. Cervical back: Normal range of motion. Skin:     General: Skin is warm. Neurological:      General: No focal deficit present. Mental Status: She is alert. Psychiatric:         Mood and Affect: Mood normal.         CT A/P:  Suggestion of mild mural thickening of the sigmoid colon (especially when   compared to the previous exam), with mild surrounding fat stranding, most   compatible with nonspecific segmental colitis.       Status post recent sleeve gastrectomy, with no complications identified. ASSESSMENT & PLAN:    1. S/P bariatric surgery  -Reviewed recent hospital stay on chart review. Pt was improved and was sent home and was tolerated fulls but then developed lower abdominal pain, nausea, emesis, and dry heaves. -Attempted to direct admit pt but no rooms available.    -Pt to go to ED. Staff called and spoke with Milagros lópez RN to have them order  CBC, CMP, lipase, COVID test, lactic acid, and CT A/P with PO (if tolerates) and IV contrast.    -Further recommendations to be based on lab and image results. No orders of the defined types were placed in this encounter. No orders of the defined types were placed in this encounter. Follow Up:  No follow-ups on file.     Nya Keating MD

## 2021-08-25 ENCOUNTER — APPOINTMENT (OUTPATIENT)
Dept: CT IMAGING | Age: 51
DRG: 441 | End: 2021-08-25
Payer: COMMERCIAL

## 2021-08-25 ENCOUNTER — APPOINTMENT (OUTPATIENT)
Dept: ULTRASOUND IMAGING | Age: 51
DRG: 441 | End: 2021-08-25
Payer: COMMERCIAL

## 2021-08-25 PROBLEM — R11.2 INTRACTABLE NAUSEA AND VOMITING: Status: ACTIVE | Noted: 2021-08-25

## 2021-08-25 LAB
ALBUMIN SERPL-MCNC: 3.3 GM/DL (ref 3.4–5)
ALP BLD-CCNC: 102 IU/L (ref 40–129)
ALT SERPL-CCNC: 32 U/L (ref 10–40)
ANION GAP SERPL CALCULATED.3IONS-SCNC: 16 MMOL/L (ref 4–16)
APTT: 30.6 SECONDS (ref 25.1–37.1)
AST SERPL-CCNC: 21 IU/L (ref 15–37)
BACTERIA: NEGATIVE /HPF
BILIRUB SERPL-MCNC: 1 MG/DL (ref 0–1)
BILIRUBIN DIRECT: 0.4 MG/DL (ref 0–0.3)
BILIRUBIN URINE: NEGATIVE MG/DL
BILIRUBIN, INDIRECT: 0.6 MG/DL (ref 0–0.7)
BLOOD, URINE: NEGATIVE
BUN BLDV-MCNC: 9 MG/DL (ref 6–23)
CALCIUM SERPL-MCNC: 9 MG/DL (ref 8.3–10.6)
CHLORIDE BLD-SCNC: 102 MMOL/L (ref 99–110)
CLARITY: CLEAR
CO2: 21 MMOL/L (ref 21–32)
COLOR: ABNORMAL
CREAT SERPL-MCNC: 0.6 MG/DL (ref 0.6–1.1)
GFR AFRICAN AMERICAN: >60 ML/MIN/1.73M2
GFR NON-AFRICAN AMERICAN: >60 ML/MIN/1.73M2
GLUCOSE BLD-MCNC: 93 MG/DL (ref 70–99)
GLUCOSE, URINE: NEGATIVE MG/DL
HCT VFR BLD CALC: 41.7 % (ref 37–47)
HEMOGLOBIN: 13.7 GM/DL (ref 12.5–16)
INR BLD: 1.09 INDEX
KETONES, URINE: ABNORMAL MG/DL
LACTATE: 1 MMOL/L (ref 0.4–2)
LEUKOCYTE ESTERASE, URINE: NEGATIVE
MCH RBC QN AUTO: 30 PG (ref 27–31)
MCHC RBC AUTO-ENTMCNC: 32.9 % (ref 32–36)
MCV RBC AUTO: 91.2 FL (ref 78–100)
MUCUS: ABNORMAL HPF
NITRITE URINE, QUANTITATIVE: NEGATIVE
PDW BLD-RTO: 14.2 % (ref 11.7–14.9)
PH, URINE: 6 (ref 5–8)
PLATELET # BLD: 122 K/CU MM (ref 140–440)
PMV BLD AUTO: 12.5 FL (ref 7.5–11.1)
POTASSIUM SERPL-SCNC: 3.2 MMOL/L (ref 3.5–5.1)
PROTEIN UA: 30 MG/DL
PROTHROMBIN TIME: 14.1 SECONDS (ref 11.7–14.5)
RBC # BLD: 4.57 M/CU MM (ref 4.2–5.4)
RBC URINE: 2 /HPF (ref 0–6)
SODIUM BLD-SCNC: 139 MMOL/L (ref 135–145)
SPECIFIC GRAVITY UA: ABNORMAL (ref 1–1.03)
SQUAMOUS EPITHELIAL: 4 /HPF
TOTAL PROTEIN: 6.3 GM/DL (ref 6.4–8.2)
TRICHOMONAS: ABNORMAL /HPF
UROBILINOGEN, URINE: 2 MG/DL (ref 0.2–1)
WBC # BLD: 4.3 K/CU MM (ref 4–10.5)
WBC UA: 1 /HPF (ref 0–5)

## 2021-08-25 PROCEDURE — 85027 COMPLETE CBC AUTOMATED: CPT

## 2021-08-25 PROCEDURE — 99024 POSTOP FOLLOW-UP VISIT: CPT | Performed by: SURGERY

## 2021-08-25 PROCEDURE — 76705 ECHO EXAM OF ABDOMEN: CPT

## 2021-08-25 PROCEDURE — G0378 HOSPITAL OBSERVATION PER HR: HCPCS

## 2021-08-25 PROCEDURE — 82248 BILIRUBIN DIRECT: CPT

## 2021-08-25 PROCEDURE — 74177 CT ABD & PELVIS W/CONTRAST: CPT

## 2021-08-25 PROCEDURE — 2580000003 HC RX 258: Performed by: STUDENT IN AN ORGANIZED HEALTH CARE EDUCATION/TRAINING PROGRAM

## 2021-08-25 PROCEDURE — 80053 COMPREHEN METABOLIC PANEL: CPT

## 2021-08-25 PROCEDURE — 6360000002 HC RX W HCPCS: Performed by: STUDENT IN AN ORGANIZED HEALTH CARE EDUCATION/TRAINING PROGRAM

## 2021-08-25 PROCEDURE — 81001 URINALYSIS AUTO W/SCOPE: CPT

## 2021-08-25 PROCEDURE — 6370000000 HC RX 637 (ALT 250 FOR IP): Performed by: STUDENT IN AN ORGANIZED HEALTH CARE EDUCATION/TRAINING PROGRAM

## 2021-08-25 PROCEDURE — 96372 THER/PROPH/DIAG INJ SC/IM: CPT

## 2021-08-25 PROCEDURE — 02HV33Z INSERTION OF INFUSION DEVICE INTO SUPERIOR VENA CAVA, PERCUTANEOUS APPROACH: ICD-10-PCS | Performed by: STUDENT IN AN ORGANIZED HEALTH CARE EDUCATION/TRAINING PROGRAM

## 2021-08-25 PROCEDURE — 87086 URINE CULTURE/COLONY COUNT: CPT

## 2021-08-25 PROCEDURE — C9113 INJ PANTOPRAZOLE SODIUM, VIA: HCPCS | Performed by: STUDENT IN AN ORGANIZED HEALTH CARE EDUCATION/TRAINING PROGRAM

## 2021-08-25 PROCEDURE — 96376 TX/PRO/DX INJ SAME DRUG ADON: CPT

## 2021-08-25 PROCEDURE — 96375 TX/PRO/DX INJ NEW DRUG ADDON: CPT

## 2021-08-25 PROCEDURE — 83605 ASSAY OF LACTIC ACID: CPT

## 2021-08-25 PROCEDURE — 6360000004 HC RX CONTRAST MEDICATION: Performed by: EMERGENCY MEDICINE

## 2021-08-25 PROCEDURE — 85730 THROMBOPLASTIN TIME PARTIAL: CPT

## 2021-08-25 PROCEDURE — 85610 PROTHROMBIN TIME: CPT

## 2021-08-25 RX ORDER — ONDANSETRON 2 MG/ML
4 INJECTION INTRAMUSCULAR; INTRAVENOUS EVERY 6 HOURS PRN
Status: DISCONTINUED | OUTPATIENT
Start: 2021-08-25 | End: 2021-08-26

## 2021-08-25 RX ORDER — SODIUM CHLORIDE 0.9 % (FLUSH) 0.9 %
5-40 SYRINGE (ML) INJECTION EVERY 12 HOURS SCHEDULED
Status: DISCONTINUED | OUTPATIENT
Start: 2021-08-25 | End: 2021-08-28 | Stop reason: HOSPADM

## 2021-08-25 RX ORDER — SODIUM CHLORIDE 9 MG/ML
25 INJECTION, SOLUTION INTRAVENOUS PRN
Status: DISCONTINUED | OUTPATIENT
Start: 2021-08-25 | End: 2021-08-28 | Stop reason: HOSPADM

## 2021-08-25 RX ORDER — TRAMADOL HYDROCHLORIDE 50 MG/1
50 TABLET ORAL EVERY 6 HOURS PRN
Status: DISCONTINUED | OUTPATIENT
Start: 2021-08-25 | End: 2021-08-28 | Stop reason: HOSPADM

## 2021-08-25 RX ORDER — PROCHLORPERAZINE EDISYLATE 5 MG/ML
10 INJECTION INTRAMUSCULAR; INTRAVENOUS EVERY 6 HOURS PRN
Status: DISCONTINUED | OUTPATIENT
Start: 2021-08-25 | End: 2021-08-26

## 2021-08-25 RX ORDER — POLYETHYLENE GLYCOL 3350 17 G/17G
17 POWDER, FOR SOLUTION ORAL DAILY PRN
Status: DISCONTINUED | OUTPATIENT
Start: 2021-08-25 | End: 2021-08-28 | Stop reason: HOSPADM

## 2021-08-25 RX ORDER — ACETAMINOPHEN 650 MG/1
650 SUPPOSITORY RECTAL EVERY 6 HOURS PRN
Status: DISCONTINUED | OUTPATIENT
Start: 2021-08-25 | End: 2021-08-28 | Stop reason: HOSPADM

## 2021-08-25 RX ORDER — ONDANSETRON 4 MG/1
4 TABLET, ORALLY DISINTEGRATING ORAL EVERY 8 HOURS PRN
Status: DISCONTINUED | OUTPATIENT
Start: 2021-08-25 | End: 2021-08-26

## 2021-08-25 RX ORDER — ACETAMINOPHEN 325 MG/1
650 TABLET ORAL EVERY 6 HOURS PRN
Status: DISCONTINUED | OUTPATIENT
Start: 2021-08-25 | End: 2021-08-28 | Stop reason: HOSPADM

## 2021-08-25 RX ORDER — ONDANSETRON 2 MG/ML
4 INJECTION INTRAMUSCULAR; INTRAVENOUS ONCE
Status: COMPLETED | OUTPATIENT
Start: 2021-08-25 | End: 2021-08-25

## 2021-08-25 RX ORDER — SODIUM CHLORIDE 0.9 % (FLUSH) 0.9 %
5-40 SYRINGE (ML) INJECTION PRN
Status: DISCONTINUED | OUTPATIENT
Start: 2021-08-25 | End: 2021-08-28 | Stop reason: HOSPADM

## 2021-08-25 RX ORDER — PANTOPRAZOLE SODIUM 40 MG/10ML
40 INJECTION, POWDER, LYOPHILIZED, FOR SOLUTION INTRAVENOUS DAILY
Status: DISCONTINUED | OUTPATIENT
Start: 2021-08-25 | End: 2021-08-28 | Stop reason: HOSPADM

## 2021-08-25 RX ORDER — SODIUM CHLORIDE, SODIUM LACTATE, POTASSIUM CHLORIDE, CALCIUM CHLORIDE 600; 310; 30; 20 MG/100ML; MG/100ML; MG/100ML; MG/100ML
INJECTION, SOLUTION INTRAVENOUS CONTINUOUS
Status: ACTIVE | OUTPATIENT
Start: 2021-08-25 | End: 2021-08-25

## 2021-08-25 RX ADMIN — POLYETHYLENE GLYCOL (3350) 17 G: 17 POWDER, FOR SOLUTION ORAL at 20:01

## 2021-08-25 RX ADMIN — ENOXAPARIN SODIUM 100 MG: 100 INJECTION SUBCUTANEOUS at 20:06

## 2021-08-25 RX ADMIN — IOPAMIDOL 75 ML: 755 INJECTION, SOLUTION INTRAVENOUS at 00:53

## 2021-08-25 RX ADMIN — PANTOPRAZOLE SODIUM 40 MG: 40 INJECTION, POWDER, FOR SOLUTION INTRAVENOUS at 08:25

## 2021-08-25 RX ADMIN — PROCHLORPERAZINE EDISYLATE 10 MG: 5 INJECTION INTRAMUSCULAR; INTRAVENOUS at 08:27

## 2021-08-25 RX ADMIN — IOHEXOL 50 ML: 240 INJECTION, SOLUTION INTRATHECAL; INTRAVASCULAR; INTRAVENOUS; ORAL at 00:52

## 2021-08-25 RX ADMIN — ENOXAPARIN SODIUM 100 MG: 100 INJECTION SUBCUTANEOUS at 08:14

## 2021-08-25 RX ADMIN — ONDANSETRON 4 MG: 2 INJECTION INTRAMUSCULAR; INTRAVENOUS at 04:30

## 2021-08-25 RX ADMIN — SODIUM CHLORIDE, PRESERVATIVE FREE 10 ML: 5 INJECTION INTRAVENOUS at 20:02

## 2021-08-25 RX ADMIN — ONDANSETRON 4 MG: 2 INJECTION INTRAMUSCULAR; INTRAVENOUS at 20:06

## 2021-08-25 RX ADMIN — SODIUM CHLORIDE, POTASSIUM CHLORIDE, SODIUM LACTATE AND CALCIUM CHLORIDE: 600; 310; 30; 20 INJECTION, SOLUTION INTRAVENOUS at 06:03

## 2021-08-25 ASSESSMENT — ENCOUNTER SYMPTOMS
COUGH: 0
RHINORRHEA: 0
EYES NEGATIVE: 1
CHEST TIGHTNESS: 0
SORE THROAT: 0
ABDOMINAL PAIN: 1
CONSTIPATION: 1
NAUSEA: 1
BACK PAIN: 0
DIARRHEA: 0
ALLERGIC/IMMUNOLOGIC NEGATIVE: 1
RESPIRATORY NEGATIVE: 1
SHORTNESS OF BREATH: 0
VOMITING: 1
COLOR CHANGE: 0
WHEEZING: 0

## 2021-08-25 ASSESSMENT — PAIN SCALES - GENERAL: PAINLEVEL_OUTOF10: 0

## 2021-08-25 NOTE — PROGRESS NOTES
Pt admitted earlier this am. She is feeling better. Labs reviewed wither and her  at the bedside. Ultrasound does not reveal any portal vein thrombosis. gen surgry note reviewed and recommends po clears. Await GI consult. Continue ivf and encouraged po diet.   Labs in am.

## 2021-08-25 NOTE — ED NOTES
Pt states she has been symptomatic with nausea and vomiting ever since her gastric sleeve surgery 8/4     Terrell Ferreira RN  08/25/21 7300

## 2021-08-25 NOTE — CONSULTS
Department of Internal Medicine  Gastroenterology Consult Note  Patricia Kong. Neal DAWSON      Reason for Consult:  Possible portal vein thrombosis    Primary Care Physician:  No primary care provider on file. History Obtained From:  patient    HISTORY OF PRESENT ILLNESS:              The patient is a 49 y.o.  female who had an uneventful gastric sleeve procedure 8/4/21. Since then she has had GI issues with frequent nausea, vomiting, abd pain and increased constipation . She has had chronic constipation but no blood in the stool or diarrhea. She came to the ED 8/17/21 because of lower abd pain and vomiting. CT then showed mild thickening of the sigmoid felt to be due to possible mild colitis although she has had no diarrhea. Her pain improved after having a couple of BM's and it was felt to be likely constipation- related. She returned to the ED now with severe crampy abd pain and vomiting. CT now questioned left portal vein thrombosis however this was not confirmed on US. She has never had a colonoscopy. Of note is that her gastric pathology from the sleeve procedure questioned H pylori gastritis. Past Medical History:        Diagnosis Date    COVID-19     \"had + covid test 4/2021- only symptom was headache\"- negative covid test 5/21/2021    H/O echocardiogram 02/25/2021    Mild LVH, AR, TR & MR.    History of kidney stones     last stone: 2006    Wears dentures     full upper plate    Wears glasses     to read       Past Surgical History:        Procedure Laterality Date    [facility]    CYSTOSCOPY  2007?    stone manipulation    DILATION AND CURETTAGE OF UTERUS  1986    HYSTERECTOMY  2007    LITHOTRIPSY      ( not sure if had this done-per pt)    SLEEVE GASTRECTOMY N/A 8/4/2021    GASTRECTOMY SLEEVE LAPAROSCOPIC ROBOTIC  POSS HIATAL HERNIA REPAIR performed by Cinthia Ivey MD at [facility] 5/28/2021    EGD BIOPSY performed by Quinton Albrecht MD at [address] [facility] N/A 8/4/2021    EGD ESOPHAGOGASTRODUODENOSCOPY performed by Quinton Albrecht MD at [facility] OR       Medications Prior to Admission:    Prior to Admission medications    Medication Sig Start Date End Date Taking? Authorizing Provider   pantoprazole (PROTONIX) 20 MG tablet Take 1 tablet by mouth 2 times daily 8/6/21   UZMA Arroyo CNP   ondansetron (ZOFRAN-ODT) 4 MG disintegrating tablet Take 1 tablet by mouth 3 times daily as needed for Nausea or Vomiting 8/6/21   UZMA Arroyo CNP       Allergies: Allergies   Allergen Reactions    Levofloxacin Shortness Of Breath   . Social History:    TOBACCO:  No  ETOH:  No    Family History:   Family History   Problem Relation Age of Onset    Asthma Mother         copd    Heart Disease Mother         chf    Stroke Father        REVIEW OF SYSTEMS: (POSITIVES WILL BE UNDERLINED)  CONSTITUTIONAL:    Weight change,fatigue, fever, chills  EYES:  Diplopia, change in vision  EARS:  hearing loss, tinnitus, vertigo  NOSE:   epistaxis  MOUTH/THROAT:     hoarseness, sore throat. RESPIRATORY:  SOB,  cough, sputum, hemoptysis  CARDIOVASCULAR : chest pain,palpitations, dyspnea exertion, orthopnea, paroxysmal nocturnal dyspnea, pedal edema. GASTROINTESTINAL:  See HPI  GENITOURINARY:   dysuria, hematuria, . HEMATOLOGIC/LYMPHATIC:   Anemia, bleeding tendencies.   MUSCULOSKELETAL:    myalgias,  joint pain  NEUROLOGICAL:   Loss of Consciousness, paresthesias, anesthesias, focal weakness  SKIN :   History of dermatitis, rashes  PSYCHIATRIC:  depression, , anxiety past psychosis  ENDOCRINE:  History of diabetes, thyroid disease  ALL/IMM : allergies, rashes    PHYSICAL EXAM:    Vitals:  /78   Pulse 73   Temp 98 °F (36.7 °C) (Oral)   Resp 16   Ht 5' 1\" (1.549 m)   Wt 210 lb (95.3 kg)   SpO2 98%   BMI 39.68 kg/m²   CONSTITUTIONAL: alert, cooperative, no apparent distress,   EYES:  pupils equal, round and reactive to light and sclera clear  ENT:  normocepalic, without obvious abnormality  NECK:  supple, symmetrical, trachea midline  HEMATOLOGIC/LYMPHATICS:  no cervical lymphadenopathy and no supraclavicular lymphadenopathy  LUNGS:  clear to auscultation  CARDIOVASCULAR:  regular rate and rhythm and no murmur noted  ABDOMEN:  Soft, non-tender with normal bowel sounds. No organomegaly or masses  NEUROLOGIC: no focal deficit detected  SKIN:  no lesions  EXTREMITIES: no clubbing, cyanosis, or edema    IMPRESSION:  1) S/P gastric sleeve 8/4/21  2) possible thickened sigmoid on CT 8/17/21- not apparent on CT today  3) ? partial portal vein thrombosis  4) abd pain, increased constipation and vomiting since gastric sleeve  5) ? H.pylori on gastric pathology 8/4/21    RECOMMENDATIONS:  1) agree with anticoag for now and hematology consult for opinion about hypercoagulopathy  2) eventually will need colonoscopy and possibly EGD- will check with Dr Inge Huerta about when this would be safe in terms of the recent surgery  3) stool H.pylori antigen- if negative do gastric biopsies at eventual EGD  4) mobilize stool        Mikala Garcia M.D

## 2021-08-25 NOTE — ED NOTES
Up to the restroom. Ambulates well. Voided 300 cc dark leobardo urine.       Bethel Saravia RN  08/25/21 7096

## 2021-08-25 NOTE — CONSULTS
Department of General Surgery   Surgical Service Dr. Thea Fu   Consult Note    Date of Consult: 8/25/21    Reason for Consult:  Recent sleeve gastrectomy     Requesting Physician:  ED    CHIEF COMPLAINT:  Nausea, emesis     History Obtained From:  patient    HISTORY OF PRESENT ILLNESS:      The patient is a 49 y.o. female who presented to the ED with complaints described as:     Location: epigastric, lower abdomen  Quality: dull  Severity: 8 /10 at worst  Duration: 2-3 days  Timing: acute  Context: had sleeve gastrectomy on 8/4/21  Modifying factors: improved with IVF and anti nausea medication  Associated signs and symptoms: denies F/C, denies CP/SOB. +constipation    Pt has sleeve gastrectomy on 8/4. Procedure was uneventful and she was d/c'd home on 8/6. Pt was seen in follow up at office on 8/13 and was doing well. Pt developed lower abdominal pain on 8/17 and presented to ED. She was found to have possible sigmoid colitis. She was d/c'd home on 8/19. At that point she was tolerating PO and without N/V. Pt then had epigastric pain and dry heaving, emesis on around 8/22 - 8/24. I saw her in office on 8/24 and attempted to direct admit her to hospital; however, there were no rooms. We sent her to ED from the office. She was worked up in ED with exam, labs, and imaging. She states now her symptoms are improved. She thinks she wants to try some liquids. Past Medical History:    Past Medical History:   Diagnosis Date    COVID-19     \"had + covid test 4/2021- only symptom was headache\"- negative covid test 5/21/2021    H/O echocardiogram 02/25/2021    Mild LVH, AR, TR & MR.    History of kidney stones     last stone: 2006    Wears dentures     full upper plate    Wears glasses     to read       Past Surgical History:    Past Surgical History:   Procedure Laterality Date    [address]EN[address]OMY  2015?    [address]    CYSTOSCOPY  2007?    stone manipulation    DILATION AND CURETTAGE OF UTERUS  1986    HYSTERECTOMY  2007    LITHOTRIPSY      ( not sure if had this done-per pt)    SLEEVE GASTRECTOMY N/A 8/4/2021    GASTRECTOMY SLEEVE LAPAROSCOPIC ROBOTIC  POSS HIATAL HERNIA REPAIR performed by Hermila Moreno MD at [facility] 5/28/2021    EGD BIOPSY performed by Hermila Moreno MD at [facility] N/A 8/4/2021    EGD ESOPHAGOGASTRODUODENOSCOPY performed by Hermila [facility]no MD at [facility] OR       Current Medications:   Current Facility-Administered Medications   Medication Dose Route Frequency Provider Last Rate Last Admin    enoxaparin (LOVENOX) injection 100 mg  1 mg/kg Subcutaneous BID Cathalene Chimacum, DO        sodium chloride flush 0.9 % injection 5-40 mL  5-40 mL IntraVENous 2 times per day Cathalene Chimacum, DO        sodium chloride flush 0.9 % injection 5-40 mL  5-40 mL IntraVENous PRN Cathalene Chimacum, DO        0.9 % sodium chloride infusion  25 mL IntraVENous PRN Cathalene Chimacum, DO        ondansetron (ZOFRAN-ODT) disintegrating tablet 4 mg  4 mg Oral Q8H PRN Cathalene Chimacum, DO        Or    ondansetron (ZOFRAN) injection 4 mg  4 mg IntraVENous Q6H PRN Cathalene Chimacum, DO        polyethylene glycol (GLYCOLAX) packet 17 g  17 g Oral Daily PRN Cathalene Chimacum, DO        acetaminophen (TYLENOL) tablet 650 mg  650 mg Oral Q6H PRN Cathalene Chimacum, DO        Or    acetaminophen (TYLENOL) suppository 650 mg  650 mg Rectal Q6H PRN Cathalene Chimacum, DO        pantoprazole (PROTONIX) injection 40 mg  40 mg IntraVENous Daily Cathalene Chimacum, DO        lactated ringers infusion   IntraVENous Continuous Cathalene Chimacum,  mL/hr at 08/25/21 0603 New Bag at 08/25/21 0603    prochlorperazine (COMPAZINE) injection 10 mg  10 mg IntraVENous Q6H PRN Cathalene Chimacum, DO         Current Outpatient Medications   Medication Sig Dispense Refill    pantoprazole (PROTONIX) 20 MG tablet Take 1 tablet by mouth 2 times daily 60 tablet 3    ondansetron (ZOFRAN-ODT) 4 MG disintegrating tablet Take 1 tablet by mouth 3 times daily as needed for Nausea or Vomiting 21 tablet 2       Allergies:  Levofloxacin    Social History:   Social History     Socioeconomic History    Marital status:      Spouse name: None    Number of children: None    Years of education: None    Highest education level: None   Occupational History    None   Tobacco Use    Smoking status: Never Smoker    Smokeless tobacco: Never Used   Vaping Use    Vaping Use: Never used   Substance and Sexual Activity    Alcohol use: No     Comment: average \"1-2 times per year    Drug use: No    Sexual activity: Yes   Other Topics Concern    None   Social History Narrative    None     Social Determinants of Health     Financial Resource Strain:     Difficulty of Paying Living Expenses:    Food Insecurity:     Worried About Running Out of Food in the Last Year:     Ran Out of Food in the Last Year:    Transportation Needs:     Lack of Transportation (Medical):  Lack of Transportation (Non-Medical):    Physical Activity:     Days of Exercise per Week:     Minutes of Exercise per Session:    Stress:     Feeling of Stress :    Social Connections:     Frequency of Communication with Friends and Family:     Frequency of Social Gatherings with Friends and Family:     Attends Protestant Services:     Active Member of Clubs or Organizations:     Attends Club or Organization Meetings:     Marital Status:    Intimate Partner Violence:     Fear of Current or Ex-Partner:     Emotionally Abused:     Physically Abused:     Sexually Abused:        Family History:   Family History   Problem Relation Age of Onset    Asthma Mother         copd    Heart Disease Mother         chf    Stroke Father        REVIEW OFSYSTEMS:    Review of Systems   Constitutional: Positive for appetite change. HENT: Negative. Eyes: Negative. Respiratory: Negative.   Negative for shortness of breath. Cardiovascular: Negative for chest pain and leg swelling. Gastrointestinal: Positive for abdominal pain, constipation, nausea and vomiting. Endocrine: Negative. Genitourinary: Negative. Musculoskeletal: Negative. Skin: Negative. Allergic/Immunologic: Negative. Neurological: Negative. Hematological: Negative. Psychiatric/Behavioral: Negative. PHYSICAL EXAM:  Vitals:    08/25/21 0530 08/25/21 0600 08/25/21 0630 08/25/21 0700   BP: 103/64 113/63 108/64 (!) 99/59   Pulse: 70 72 68 74   Resp:       Temp:       TempSrc:       SpO2: 95% 99% 98% 96%   Weight:       Height:           Physical Exam  Vitals reviewed. Constitutional:       General: She is not in acute distress. Appearance: She is obese. She is ill-appearing (but improved this morning from yesterday in office). HENT:      Head: Normocephalic and atraumatic. Right Ear: External ear normal.      Left Ear: External ear normal.      Nose: Nose normal.   Eyes:      General:         Right eye: No discharge. Left eye: No discharge. Extraocular Movements: Extraocular movements intact. Cardiovascular:      Rate and Rhythm: Normal rate. Pulses: Normal pulses. Pulmonary:      Effort: Pulmonary effort is normal. No respiratory distress. Abdominal:      General: There is no distension. Palpations: Abdomen is soft. Tenderness: There is abdominal tenderness (mild epigastric). There is no guarding or rebound. Comments: Incisions well healed, surgical glue present, minimal bruising, no peritoneal signs     Musculoskeletal:         General: Normal range of motion. Skin:     General: Skin is warm. Neurological:      General: No focal deficit present. Mental Status: She is alert.    Psychiatric:         Mood and Affect: Mood normal.           DATA:    CBC with Differential:    Lab Results   Component Value Date    WBC 5.3 08/24/2021    RBC 5.07 08/24/2021    HGB 14.7 08/24/2021    HCT 45.7 08/24/2021     08/24/2021    MCV 90.1 08/24/2021    MCH 29.0 08/24/2021    MCHC 32.2 08/24/2021    RDW 13.8 08/24/2021    SEGSPCT 75.6 08/24/2021    LYMPHOPCT 11.6 08/24/2021    MONOPCT 11.8 08/24/2021    EOSPCT 1.4 08/01/2011    BASOPCT 0.4 08/24/2021    MONOSABS 0.6 08/24/2021    LYMPHSABS 0.6 08/24/2021    EOSABS 0.0 08/24/2021    BASOSABS 0.0 08/24/2021    DIFFTYPE AUTOMATED DIFFERENTIAL 08/24/2021     CMP:    Lab Results   Component Value Date     08/24/2021    K 4.1 08/24/2021    CL 98 08/24/2021    CO2 21 08/24/2021    BUN 9 08/24/2021    CREATININE 0.7 08/24/2021    GFRAA >60 08/24/2021    LABGLOM >60 08/24/2021    GLUCOSE 102 08/24/2021    PROT 7.3 08/24/2021    PROT 7.2 12/25/2010    LABALBU 4.0 08/24/2021    CALCIUM 9.6 08/24/2021    BILITOT 1.2 08/24/2021    ALKPHOS 82 08/24/2021    AST 18 08/24/2021    ALT 31 08/24/2021     LIPASE:    Lab Results   Component Value Date    LIPASE 169 08/24/2021     LA - 1    CT A/P:  FINDINGS:   Lower Chest:  Visualized portion of the lower chest demonstrates no acute   abnormality.       Organs: There appears to be a new left portal vein thrombus. Frank Leal appears   to be new left portal vein thrombus which extends into hepatic segments 2 and   3.  The right anterior and posterior and main portal veins appear patent. Small focal fatty infiltration adjacent to the falciform ligament.  Status   post cholecystectomy with mild postcholecystectomy common bile duct and   intrahepatic biliary dilatation.  The spleen, pancreas, kidneys and adrenal   glands are without acute findings.       GI/Bowel: Status post sleeve gastrectomy.  No mechanical bowel obstruction. The appendix is surgically absent.       Pelvis:  The urinary bladder is partially distended without contour   abnormality.  Status post hysterectomy.  Unremarkable appearance of the left   ovary.  The right ovary is not identified.       Peritoneum/Retroperitoneum: The hepatic, splenic and superior mesenteric   veins are patent.  The aorta and IVC are normal in caliber.  No ascites or   pneumoperitoneum.       Bones/Soft Tissues: No acute bony abnormality. IMPRESSION:        Patient Active Problem List:     Lipoma of right forearm     Morbid obesity ([address]     Status post bariatric surgery     Status post laparoscopic sleeve gastrectomy     Intractable nausea and vomiting      50 y/o F s/p sleeve gastrectomy on 8/4 now with nausea and emesis and findings concerning for portal vein thrombus    PLAN:    -Reviewed labs and images with pt. Stomach appears WNL from post-surgery sleeve. Now with findings concerning for portal vein thrombus. F/u US results.   -Continue anticoagulation per primary team. Appreciated. Agree with GI c/s.  -Will place c/s to hematology. Pt denies previous hx of DVT /PE. -Will continue to follow. Possible trial of clears later today.  -Nausea control. -IVF. -Above plan d/w pt.        Michael Anedrson MD

## 2021-08-25 NOTE — ED NOTES
Vital signs rechecked by EDT . Awaiting available room. Updated on delay and wait time.       Katarina Poe RN  08/24/21 2031

## 2021-08-25 NOTE — ED NOTES
Bedside report given to [facility] RN in the OBS unit, after taking the patient to room [address] , [facility]  08/25/21 2597

## 2021-08-25 NOTE — ED NOTES
Per Dr. Savannah Miranda ok to not do COVID swab on pt as ordered by Dr. Joey [facility].      Gwendolyn Leon RN  08/25/21 [id number]

## 2021-08-25 NOTE — ED PROVIDER NOTES
HYSTERECTOMY  2007    LITHOTRIPSY      ( not sure if had this done-per pt)    SLEEVE GASTRECTOMY N/A 8/4/2021    GASTRECTOMY SLEEVE LAPAROSCOPIC ROBOTIC  POSS HIATAL HERNIA REPAIR performed by Diana Zee MD at [facility] ENDOSCOPY N/A 5/28/2021    EGD BIOPSY performed by Diana Zee MD at [address] [facility] N/A 8/4/2021    EGD ESOPHAGOGASTRODUODENOSCOPY performed by Diana Zee MD at [facility]  Family History   Problem Relation Age of Onset    Asthma Mother         copd    Heart Disease Mother         chf    Stroke Father        SOCIAL HISTORY  Social History     Socioeconomic History    Marital status:      Spouse name: None    Number of children: None    Years of education: None    Highest education level: None   Occupational History    None   Tobacco Use    Smoking status: Never Smoker    Smokeless tobacco: Never Used   Vaping Use    Vaping Use: Never used   Substance and Sexual Activity    Alcohol use: No     Comment: average \"1-2 times per year    Drug use: No    Sexual activity: Yes   Other Topics Concern    None   Social History Narrative    None     Social Determinants of Health     Financial Resource Strain:     Difficulty of Paying Living Expenses:    Food Insecurity:     Worried About Running Out of Food in the Last Year:     Ran Out of Food in the Last Year:    Transportation Needs:     Lack of Transportation (Medical):      Lack of Transportation (Non-Medical):    Physical Activity:     Days of Exercise per Week:     Minutes of Exercise per Session:    Stress:     Feeling of Stress :    Social Connections:     Frequency of Communication with Friends and Family:     Frequency of Social Gatherings with Friends and Family:     Attends Episcopal Services:     Active Member of Clubs or Organizations:     Attends Club or Organization Meetings:     Marital Status:    Intimate this visit (if applicable):  Results for orders placed or performed during the hospital encounter of 08/24/21   CMP   Result Value Ref Range    Sodium 139 135 - 145 MMOL/L    Potassium 4.1 3.5 - 5.1 MMOL/L    Chloride 98 (L) 99 - 110 mMol/L    CO2 21 21 - 32 MMOL/L    BUN 9 6 - 23 MG/DL    CREATININE 0.7 0.6 - 1.1 MG/DL    Glucose 102 (H) 70 - 99 MG/DL    Calcium 9.6 8.3 - 10.6 MG/DL    Albumin 4.0 3.4 - 5.0 GM/DL    Total Protein 7.3 6.4 - 8.2 GM/DL    Total Bilirubin 1.2 (H) 0.0 - 1.0 MG/DL    ALT 31 10 - 40 U/L    AST 18 15 - 37 IU/L    Alkaline Phosphatase 82 40 - 129 IU/L    GFR Non-African American >60 >60 mL/min/1.73m2    GFR African American >60 >60 mL/min/1.73m2    Anion Gap 20 (H) 4 - 16   CBC auto diff   Result Value Ref Range    WBC 5.3 4.0 - 10.5 K/CU MM    RBC 5.07 4.2 - 5.4 M/CU MM    Hemoglobin 14.7 12.5 - 16.0 GM/DL    Hematocrit 45.7 37 - 47 %    MCV 90.1 78 - 100 FL    MCH 29.0 27 - 31 PG    MCHC 32.2 32.0 - 36.0 %    RDW 13.8 11.7 - 14.9 %    Platelets 463 185 - 968 K/CU MM    MPV 12.6 (H) 7.5 - 11.1 FL    Differential Type AUTOMATED DIFFERENTIAL     Segs Relative 75.6 (H) 36 - 66 %    Lymphocytes % 11.6 (L) 24 - 44 %    Monocytes % 11.8 (H) 0 - 4 %    Eosinophils % 0.2 0 - 3 %    Basophils % 0.4 0 - 1 %    Segs Absolute 4.0 K/CU MM    Lymphocytes Absolute 0.6 K/CU MM    Monocytes Absolute 0.6 K/CU MM    Eosinophils Absolute 0.0 K/CU MM    Basophils Absolute 0.0 K/CU MM    Nucleated RBC % 0.0 %    Total Nucleated RBC 0.0 K/CU MM    Total Immature Neutrophil 0.02 K/CU MM    Immature Neutrophil % 0.4 0 - 0.43 %   Lactic acid, plasma   Result Value Ref Range    Lactate 1.0 0.4 - 2.0 mMOL/L   Lipase   Result Value Ref Range    Lipase 169 (H) 13 - 60 IU/L   Beta-Hydroxybutyrate   Result Value Ref Range    Beta-Hydroxybutyrate >20.8 (H) 0.0 - 3.0 MG/DL          ABNORMAL LABS:  Labs Reviewed   COMPREHENSIVE METABOLIC PANEL - Abnormal; Notable for the following components:       Result Value    Chloride 98%   BMI 39.68 kg/m²     70-year-old female with abdominal cramping with intractable nausea and vomiting with evidence of ketoacidosis. CT of the abdomen pelvis was obtained and suggest possible portal vein thrombosis. Additional workup and treatment in the ED as documented above. Pt will be admitted for further evaluation and treatment. I discussed the case with hospitalist, who agreed to admit the patient. Plan discussed with pt and/or family who expressed understanding and agreement with plan. Admitted in stable condition. Clinical Impression:  1. Intractable vomiting with nausea, unspecified vomiting type    2. Portal vein thrombosis    3. High anion gap metabolic acidosis        Disposition referral (if applicable):  No follow-up provider specified. Disposition medications (if applicable):  New Prescriptions    No medications on file       ED Provider Disposition Time  DISPOSITION            Electronically signed by: Mario Moulton M.D., 8/25/2021 2:54 AM      This dictation was created with voice recognition software. While attempts have been made to review the dictation as it is transcribed, on occasion the spoken word can be misinterpreted by the technology leading to omissions or inappropriate words, phrases or sentences.         Belen Pelaez MD  08/25/21 5425

## 2021-08-25 NOTE — H&P
History and Physical      Name:  Oziel Swartz /Age/Sex: 1970  (49 y.o. female)   MRN & CSN:  [id number] & [id number] Admission Date/Time: 2021 10:37 PM   Location:  [facility] PCP: No primary care provider on file. Hospital Day: 2    Assessment and Plan:   Oziel Swartz is a 49 y.o.  female  who presents with Intractable nausea and vomiting    Intractable nausea and vomiting  Abdominal pain status post gastric sleeve  Portal venous thrombosis, acute  -Admit to observation services  -CT abdomen/pelvis with IV contrast showed findings suspicious for new left portal venous thrombosis. -Ordered RUQ US to confirm  -Lovenox 1 mg/kg twice daily  -Follow LFTs and transaminase measurements.  -Consulted gastroenterology   -Check UA and urine culture  -Zofran and Compazine for nausea. Can consider Reglan. Convert home oral Protonix to IV. -NPO  -Initiate IVF with LR at 100 mL an hour for 10 hours    Elevated beta hydroxybutyrate  Elevated anion gap  Hyperbilirubinemia  Elevated lipase  -All the above likely related to intractable nausea and vomiting x3 days with inability to keep down anything p.o.  -Beta hydroxybutyrate greater than 20.8, anion gap 20, and bilirubin 1.2  -CO2 21, glucose 102, and no history of diabetes. -Hepatic function panel in a.m. Other chronic medical conditions:  Obesity    Diet  n.p.o.   DVT Prophylaxis [x] Lovenox, []  Heparin, [] SCDs, [] Ambulation   GI Prophylaxis [x] PPI,  [] H2 Blocker,  [] Carafate,  [] Diet/Tube Feeds   Code Status Prior   Disposition Patient requires continued admission due to Intractable nausea and vomiting   MDM [] Low, [] Moderate,[x]  High  Patient's risk as above due to acuity of condition with potential for decompensation.      History of Present Illness:     Chief Complaint: Intractable nausea and vomiting    Oziel Swartz is a 49 y.o.  female with a reviewed and noncontributory family history and a PMH as stated above, who presents with complaints of persistent nausea and vomiting that started 3 days ago. Patient does endorse laparoscopic gastric sleeve placement on 7/2/0164 complicated by an admission on 8/17/2021 for abdominal pain. Patient describes nausea as severe. Vomiting has occurred too many to count times over the past 3 days. Vomitus is described as normal gastric contents. Symptoms have been associated with moderate abdominal pain and inability to keep down fluids. Patient denies alcohol overuse, fever, hematemesis and melena. Symptoms have gradually worsened. In regards to patient's complaints of abdominal pain. The pain is described as sharp and shooting, and is 8/10 in intensity. The patient is experiencing LLQ and RLQ pain without radiation. Onset was 3 days ago. Symptoms have been gradually worsening. Aggravating factors: Nausea with emesis. Alleviating factors: none. Associated symptoms: constipation. The patient denies chills, diarrhea, dysuria, fever, frequency, hematochezia, hematuria and melena. Discussed case with ED provider. ROS:   Review of Systems   Constitutional: Negative for appetite change, chills, fatigue and fever. HENT: Negative for congestion, rhinorrhea and sore throat. Eyes: Negative for visual disturbance. Respiratory: Negative for cough, chest tightness, shortness of breath and wheezing. Cardiovascular: Negative for chest pain and palpitations. Gastrointestinal: Positive for abdominal pain, constipation, nausea and vomiting. Negative for diarrhea. Genitourinary: Negative for dysuria, frequency, hematuria and urgency. Musculoskeletal: Negative for arthralgias and back pain. Skin: Negative for color change, pallor and rash. Neurological: Positive for headaches. Negative for dizziness, seizures, syncope, speech difficulty, weakness and numbness. Psychiatric/Behavioral: Negative for confusion.        Objective:   No intake or output data in the 24 hours ending 08/25/21 0245   Vitals:   Vitals:    08/25/21 0200   BP: 109/61   Pulse: 79   Resp:    Temp:    SpO2: 98%     /61   Pulse 79   Temp 98 °F (36.7 °C) (Oral)   Resp 17   Ht 5' 1\" (1.549 m)   Wt 210 lb (95.3 kg)   SpO2 98%   BMI 39.68 kg/m²   Physical Exam:   Physical Exam  Vitals and nursing note reviewed. Constitutional:       General: She is awake. She is not in acute distress. Appearance: Normal appearance. She is obese. She is ill-appearing. She is not toxic-appearing or diaphoretic. Interventions: She is not intubated. Comments: Appears uncomfortable and in pain   HENT:      Head: Atraumatic. Right Ear: External ear normal.      Left Ear: External ear normal.      Nose: Nose normal. No rhinorrhea. Mouth/Throat:      Mouth: Mucous membranes are moist.   Eyes:      General: No scleral icterus. Extraocular Movements: Extraocular movements intact. Conjunctiva/sclera: Conjunctivae normal.      Pupils: Pupils are equal, round, and reactive to light. Cardiovascular:      Rate and Rhythm: Normal rate and regular rhythm. Pulses: Normal pulses. Heart sounds: Normal heart sounds. No murmur heard. No gallop. Pulmonary:      Effort: Pulmonary effort is normal. No tachypnea, accessory muscle usage or prolonged expiration. She is not intubated. Breath sounds: Normal breath sounds. No wheezing, rhonchi or rales. Abdominal:      General: Abdomen is protuberant. Bowel sounds are normal. There is no distension. Palpations: Abdomen is soft. Tenderness: There is generalized abdominal tenderness and tenderness in the right upper quadrant, right lower quadrant, epigastric area and left lower quadrant. There is no guarding or rebound. Negative signs include Mustafa's sign and Rovsing's sign. Comments: Patient complains of left lower and right lower quadrant abdominal pain which is tender to palpation.   There is generalized abdominal tenderness noted as well with increasing tenderness to right upper quadrant and epigastric region as well. Laparoscopic incisions healing well with Dermabond in place. No evidence of cellulitis surrounding healing incisions. Musculoskeletal:         General: Normal range of motion. Cervical back: Neck supple. Right lower leg: No edema. Left lower leg: No edema. Skin:     General: Skin is warm and dry. Capillary Refill: Capillary refill takes less than 2 seconds. Neurological:      General: No focal deficit present. Mental Status: She is alert and oriented to person, place, and time. Mental status is at baseline. Cranial Nerves: No cranial nerve deficit, dysarthria or facial asymmetry. Motor: No tremor or seizure activity. Psychiatric:         Attention and Perception: She is attentive. Mood and Affect: Mood is not anxious. Speech: She is communicative. Speech is not slurred. Behavior: Behavior is cooperative. Past Medical History:      Past Medical History:   Diagnosis Date    COVID-19     \"had + covid test 4/2021- only symptom was headache\"- negative covid test 5/21/2021    H/O echocardiogram 02/25/2021    Mild LVH, AR, TR & MR.    History of kidney stones     last stone: 2006    Wears dentures     full upper plate    Wears glasses     to read     PSHX:  has a past surgical history that includes Lithotripsy; Cystoscopy (2007?); Hysterectomy (2007); Cholecystectomy (1987); Appendectomy (2015?); Dilation and curettage of uterus (1986); Upper gastrointestinal endoscopy (N/A, 5/28/2021); Sleeve Gastrectomy (N/A, 8/4/2021); and Upper gastrointestinal endoscopy (N/A, 8/4/2021). Allergies: Allergies   Allergen Reactions    Levofloxacin Shortness Of Breath       FAM HX: family history includes Asthma in her mother; Heart Disease in her mother; Stroke in her father.   Soc HX:   Social History     Socioeconomic History    Marital status:      Spouse name: None    Number of children: None    Years of education: None    Highest education level: None   Occupational History    None   Tobacco Use    Smoking status: Never Smoker    Smokeless tobacco: Never Used   Vaping Use    Vaping Use: Never used   Substance and Sexual Activity    Alcohol use: No     Comment: average \"1-2 times per year    Drug use: No    Sexual activity: Yes   Other Topics Concern    None   Social History Narrative    None     Social Determinants of Health     Financial Resource Strain:     Difficulty of Paying Living Expenses:    Food Insecurity:     Worried About Running Out of Food in the Last Year:     Ran Out of Food in the Last Year:    Transportation Needs:     Lack of Transportation (Medical):      Lack of Transportation (Non-Medical):    Physical Activity:     Days of Exercise per Week:     Minutes of Exercise per Session:    Stress:     Feeling of Stress :    Social Connections:     Frequency of Communication with Friends and Family:     Frequency of Social Gatherings with Friends and Family:     Attends Mandaeism Services:     Active Member of Clubs or Organizations:     Attends Club or Organization Meetings:     Marital Status:    Intimate Partner Violence:     Fear of Current or Ex-Partner:     Emotionally Abused:     Physically Abused:     Sexually Abused:        Data:   CBC with Differential:    Lab Results   Component Value Date    WBC 5.3 08/24/2021    RBC 5.07 08/24/2021    HGB 14.7 08/24/2021    HCT 45.7 08/24/2021     08/24/2021    MCV 90.1 08/24/2021    MCH 29.0 08/24/2021    MCHC 32.2 08/24/2021    RDW 13.8 08/24/2021    SEGSPCT 75.6 08/24/2021    LYMPHOPCT 11.6 08/24/2021    MONOPCT 11.8 08/24/2021    EOSPCT 1.4 08/01/2011    BASOPCT 0.4 08/24/2021    MONOSABS 0.6 08/24/2021    LYMPHSABS 0.6 08/24/2021    EOSABS 0.0 08/24/2021    BASOSABS 0.0 08/24/2021    DIFFTYPE AUTOMATED DIFFERENTIAL 08/24/2021       CMP:     Lab Results   Component Value Date     08/24/2021    K 4.1 08/24/2021    CL 98 08/24/2021    CO2 21 08/24/2021    BUN 9 08/24/2021    CREATININE 0.7 08/24/2021    GFRAA >60 08/24/2021    LABGLOM >60 08/24/2021    GLUCOSE 102 08/24/2021    PROT 7.3 08/24/2021    PROT 7.2 12/25/2010    LABALBU 4.0 08/24/2021    CALCIUM 9.6 08/24/2021    BILITOT 1.2 08/24/2021    ALKPHOS 82 08/24/2021    AST 18 08/24/2021    ALT 31 08/24/2021       Troponin:  Lab Results   Component Value Date    TROPONINT <0.010 08/17/2021     Radiology results:  CT ABDOMEN PELVIS W IV CONTRAST Additional Contrast? Oral   Final Result   Findings suspicious for new left portal vein thrombus. Medications:   Home Medications:   Prior to Admission medications    Medication Sig Start Date End Date Taking? Authorizing Provider   pantoprazole (PROTONIX) 20 MG tablet Take 1 tablet by mouth 2 times daily 8/6/21   UZMA Dominguez CNP   ondansetron (ZOFRAN-ODT) 4 MG disintegrating tablet Take 1 tablet by mouth 3 times daily as needed for Nausea or Vomiting 8/6/21   UZMA Dominguez CNP     Medications:    enoxaparin  1 mg/kg Subcutaneous BID    ondansetron  4 mg IntraVENous Once      Infusions:   PRN Meds:      Electronically signed by Saadia Singh DO on 8/25/2021 at 2:44 AM      This dictation was created with voice recognition software. While attempts have been made to review the dictation as it is transcribed, on occasion the spoken word can be misinterpreted by the technology leading to omissions or inappropriate words, phrases or sentences.

## 2021-08-26 ENCOUNTER — APPOINTMENT (OUTPATIENT)
Dept: GENERAL RADIOLOGY | Age: 51
DRG: 441 | End: 2021-08-26
Payer: COMMERCIAL

## 2021-08-26 PROBLEM — R10.30 LOWER ABDOMINAL PAIN: Status: ACTIVE | Noted: 2021-08-17

## 2021-08-26 LAB
ALBUMIN SERPL-MCNC: 3.3 GM/DL (ref 3.4–5)
ANION GAP SERPL CALCULATED.3IONS-SCNC: 18 MMOL/L (ref 4–16)
BUN BLDV-MCNC: 8 MG/DL (ref 6–23)
CALCIUM SERPL-MCNC: 9.2 MG/DL (ref 8.3–10.6)
CHLORIDE BLD-SCNC: 102 MMOL/L (ref 99–110)
CO2: 22 MMOL/L (ref 21–32)
CREAT SERPL-MCNC: 0.5 MG/DL (ref 0.6–1.1)
CULTURE: NORMAL
GFR AFRICAN AMERICAN: >60 ML/MIN/1.73M2
GFR NON-AFRICAN AMERICAN: >60 ML/MIN/1.73M2
GLUCOSE BLD-MCNC: 85 MG/DL (ref 70–99)
HCT VFR BLD CALC: 44 % (ref 37–47)
HEMOGLOBIN: 13.9 GM/DL (ref 12.5–16)
HOMOCYSTEINE: 7.6 UMOL/L (ref 0–10)
Lab: NORMAL
MAGNESIUM: 1.8 MG/DL (ref 1.8–2.4)
MCH RBC QN AUTO: 29.3 PG (ref 27–31)
MCHC RBC AUTO-ENTMCNC: 31.6 % (ref 32–36)
MCV RBC AUTO: 92.8 FL (ref 78–100)
PDW BLD-RTO: 13.8 % (ref 11.7–14.9)
PHOSPHORUS: 2.3 MG/DL (ref 2.5–4.9)
PLATELET # BLD: 156 K/CU MM (ref 140–440)
PMV BLD AUTO: 12 FL (ref 7.5–11.1)
POTASSIUM SERPL-SCNC: 3.4 MMOL/L (ref 3.5–5.1)
RBC # BLD: 4.74 M/CU MM (ref 4.2–5.4)
SODIUM BLD-SCNC: 142 MMOL/L (ref 135–145)
SPECIMEN: NORMAL
WBC # BLD: 3.1 K/CU MM (ref 4–10.5)

## 2021-08-26 PROCEDURE — C9113 INJ PANTOPRAZOLE SODIUM, VIA: HCPCS | Performed by: STUDENT IN AN ORGANIZED HEALTH CARE EDUCATION/TRAINING PROGRAM

## 2021-08-26 PROCEDURE — 96372 THER/PROPH/DIAG INJ SC/IM: CPT

## 2021-08-26 PROCEDURE — 85245 CLOT FACTOR VIII VW RISTOCTN: CPT

## 2021-08-26 PROCEDURE — 99024 POSTOP FOLLOW-UP VISIT: CPT | Performed by: SURGERY

## 2021-08-26 PROCEDURE — 36415 COLL VENOUS BLD VENIPUNCTURE: CPT

## 2021-08-26 PROCEDURE — 99223 1ST HOSP IP/OBS HIGH 75: CPT | Performed by: SPECIALIST

## 2021-08-26 PROCEDURE — 85305 CLOT INHIBIT PROT S TOTAL: CPT

## 2021-08-26 PROCEDURE — 2580000003 HC RX 258: Performed by: NURSE PRACTITIONER

## 2021-08-26 PROCEDURE — 99204 OFFICE O/P NEW MOD 45 MIN: CPT | Performed by: INTERNAL MEDICINE

## 2021-08-26 PROCEDURE — 86147 CARDIOLIPIN ANTIBODY EA IG: CPT

## 2021-08-26 PROCEDURE — 2500000003 HC RX 250 WO HCPCS: Performed by: SURGERY

## 2021-08-26 PROCEDURE — 2580000003 HC RX 258: Performed by: STUDENT IN AN ORGANIZED HEALTH CARE EDUCATION/TRAINING PROGRAM

## 2021-08-26 PROCEDURE — 81241 F5 GENE: CPT

## 2021-08-26 PROCEDURE — 96376 TX/PRO/DX INJ SAME DRUG ADON: CPT

## 2021-08-26 PROCEDURE — 86146 BETA-2 GLYCOPROTEIN ANTIBODY: CPT

## 2021-08-26 PROCEDURE — 87338 HPYLORI STOOL AG IA: CPT

## 2021-08-26 PROCEDURE — 74246 X-RAY XM UPR GI TRC 2CNTRST: CPT

## 2021-08-26 PROCEDURE — 83090 ASSAY OF HOMOCYSTEINE: CPT

## 2021-08-26 PROCEDURE — 6370000000 HC RX 637 (ALT 250 FOR IP): Performed by: SPECIALIST

## 2021-08-26 PROCEDURE — 85303 CLOT INHIBIT PROT C ACTIVITY: CPT

## 2021-08-26 PROCEDURE — G0378 HOSPITAL OBSERVATION PER HR: HCPCS

## 2021-08-26 PROCEDURE — 6360000002 HC RX W HCPCS: Performed by: SURGERY

## 2021-08-26 PROCEDURE — 85246 CLOT FACTOR VIII VW ANTIGEN: CPT

## 2021-08-26 PROCEDURE — 80069 RENAL FUNCTION PANEL: CPT

## 2021-08-26 PROCEDURE — 85027 COMPLETE CBC AUTOMATED: CPT

## 2021-08-26 PROCEDURE — 83735 ASSAY OF MAGNESIUM: CPT

## 2021-08-26 PROCEDURE — 81240 F2 GENE: CPT

## 2021-08-26 PROCEDURE — 85240 CLOT FACTOR VIII AHG 1 STAGE: CPT

## 2021-08-26 PROCEDURE — 85613 RUSSELL VIPER VENOM DILUTED: CPT

## 2021-08-26 PROCEDURE — 6360000002 HC RX W HCPCS: Performed by: STUDENT IN AN ORGANIZED HEALTH CARE EDUCATION/TRAINING PROGRAM

## 2021-08-26 RX ORDER — ONDANSETRON 2 MG/ML
4 INJECTION INTRAMUSCULAR; INTRAVENOUS EVERY 6 HOURS
Status: DISCONTINUED | OUTPATIENT
Start: 2021-08-26 | End: 2021-08-26

## 2021-08-26 RX ORDER — PROMETHAZINE HYDROCHLORIDE 50 MG/ML
25 INJECTION, SOLUTION INTRAMUSCULAR; INTRAVENOUS EVERY 6 HOURS PRN
Status: DISCONTINUED | OUTPATIENT
Start: 2021-08-26 | End: 2021-08-26

## 2021-08-26 RX ORDER — SENNA AND DOCUSATE SODIUM 50; 8.6 MG/1; MG/1
2 TABLET, FILM COATED ORAL DAILY
Status: DISCONTINUED | OUTPATIENT
Start: 2021-08-26 | End: 2021-08-28 | Stop reason: HOSPADM

## 2021-08-26 RX ORDER — ONDANSETRON 4 MG/1
4 TABLET, ORALLY DISINTEGRATING ORAL EVERY 8 HOURS PRN
Status: DISCONTINUED | OUTPATIENT
Start: 2021-08-26 | End: 2021-08-26

## 2021-08-26 RX ORDER — MORPHINE SULFATE 4 MG/ML
2 INJECTION, SOLUTION INTRAMUSCULAR; INTRAVENOUS EVERY 4 HOURS PRN
Status: DISCONTINUED | OUTPATIENT
Start: 2021-08-26 | End: 2021-08-28 | Stop reason: HOSPADM

## 2021-08-26 RX ORDER — SODIUM CHLORIDE 9 MG/ML
INJECTION, SOLUTION INTRAVENOUS CONTINUOUS
Status: DISCONTINUED | OUTPATIENT
Start: 2021-08-26 | End: 2021-08-28 | Stop reason: HOSPADM

## 2021-08-26 RX ORDER — ONDANSETRON 4 MG/1
8 TABLET, ORALLY DISINTEGRATING ORAL EVERY 8 HOURS PRN
Status: DISCONTINUED | OUTPATIENT
Start: 2021-08-26 | End: 2021-08-26

## 2021-08-26 RX ORDER — POLYETHYLENE GLYCOL 3350 17 G/17G
17 POWDER, FOR SOLUTION ORAL DAILY
Status: DISCONTINUED | OUTPATIENT
Start: 2021-08-26 | End: 2021-08-28 | Stop reason: HOSPADM

## 2021-08-26 RX ORDER — ONDANSETRON 2 MG/ML
4 INJECTION INTRAMUSCULAR; INTRAVENOUS EVERY 6 HOURS PRN
Status: DISCONTINUED | OUTPATIENT
Start: 2021-08-26 | End: 2021-08-28 | Stop reason: HOSPADM

## 2021-08-26 RX ORDER — BISACODYL 10 MG
10 SUPPOSITORY, RECTAL RECTAL DAILY
Status: DISCONTINUED | OUTPATIENT
Start: 2021-08-26 | End: 2021-08-28 | Stop reason: HOSPADM

## 2021-08-26 RX ORDER — SODIUM PHOSPHATE, DIBASIC AND SODIUM PHOSPHATE, MONOBASIC 7; 19 G/133ML; G/133ML
1 ENEMA RECTAL ONCE
Status: COMPLETED | OUTPATIENT
Start: 2021-08-26 | End: 2021-08-26

## 2021-08-26 RX ADMIN — PANTOPRAZOLE SODIUM 40 MG: 40 INJECTION, POWDER, FOR SOLUTION INTRAVENOUS at 12:35

## 2021-08-26 RX ADMIN — SODIUM CHLORIDE, PRESERVATIVE FREE 10 ML: 5 INJECTION INTRAVENOUS at 12:36

## 2021-08-26 RX ADMIN — ONDANSETRON 4 MG: 2 INJECTION INTRAMUSCULAR; INTRAVENOUS at 17:59

## 2021-08-26 RX ADMIN — SODIUM CHLORIDE: 9 INJECTION, SOLUTION INTRAVENOUS at 12:16

## 2021-08-26 RX ADMIN — ENOXAPARIN SODIUM 100 MG: 100 INJECTION SUBCUTANEOUS at 20:29

## 2021-08-26 RX ADMIN — ENOXAPARIN SODIUM 100 MG: 100 INJECTION SUBCUTANEOUS at 12:35

## 2021-08-26 RX ADMIN — I.V. FAT EMULSION 250 ML: 20 EMULSION INTRAVENOUS at 20:08

## 2021-08-26 RX ADMIN — SODIUM CHLORIDE, PRESERVATIVE FREE 10 ML: 5 INJECTION INTRAVENOUS at 20:09

## 2021-08-26 RX ADMIN — ONDANSETRON 4 MG: 2 INJECTION INTRAMUSCULAR; INTRAVENOUS at 04:27

## 2021-08-26 RX ADMIN — SODIUM PHOSPHATE, DIBASIC AND SODIUM PHOSPHATE, MONOBASIC 1 ENEMA: 7; 19 ENEMA RECTAL at 13:04

## 2021-08-26 RX ADMIN — ONDANSETRON 4 MG: 2 INJECTION INTRAMUSCULAR; INTRAVENOUS at 12:40

## 2021-08-26 RX ADMIN — ASCORBIC ACID, VITAMIN A PALMITATE, CHOLECALCIFEROL, THIAMINE HYDROCHLORIDE, RIBOFLAVIN-5 PHOSPHATE SODIUM, PYRIDOXINE HYDROCHLORIDE, NIACINAMIDE, DEXPANTHENOL, ALPHA-TOCOPHEROL ACETATE, VITAMIN K1, FOLIC ACID, BIOTIN, CYANOCOBALAMIN: 200; 3300; 200; 6; 3.6; 6; 40; 15; 10; 150; 600; 60; 5 INJECTION, SOLUTION INTRAVENOUS at 20:08

## 2021-08-26 ASSESSMENT — ENCOUNTER SYMPTOMS
VOMITING: 1
NAUSEA: 1
ALLERGIC/IMMUNOLOGIC NEGATIVE: 1
RESPIRATORY NEGATIVE: 1
EYES NEGATIVE: 1

## 2021-08-26 ASSESSMENT — PAIN SCALES - GENERAL
PAINLEVEL_OUTOF10: 0

## 2021-08-26 NOTE — PROGRESS NOTES
Paged Juan F Ply regarding tpn order with 22 ga. Cecilia Conrad Pharmacist states this can be run through this line due to concentration.

## 2021-08-26 NOTE — PROGRESS NOTES
TPN LAB EVALUATION  Recent Labs     08/24/21 1810 08/24/21  1810 08/25/21  0904 08/25/21  0904 08/26/21  1101      < > 139   < > 142   K 4.1   < > 3.2*   < > 3.4*   CL 98*   < > 102   < > 102   CALCIUM 9.6   < > 9.0   < > 9.2   MG  --   --   --   --  1.8   PHOS  --   --   --   --  2.3*   GLUCOSE 102*  --  93  --  85    < > = values in this interval not displayed. Pharmacy to dose TPN per Dr. Hyland Kussmaul Day #  1     Indication: intractable Nausea and Vomiting    Goal: Unknown     Central line: NO - Peripheral Line     Diet: Bariatric Clear Liquid    Maintenance Fluids: 0.9% NS @ 75 ml/hour     GLUCOSE LEVELS LAST 72 HOURS                  (last 7 readings)    Recent Labs     08/24/21 1810 08/25/21  0904 08/26/21  1101   GLUCOSE 102* 93 85       Glucose:  Goal <180   0 readings below/above goal   NO sliding scale insulin order   0 units of insulin in TPN     TRIGLYCERIDES:  Triglycerides   Date Value Ref Range Status   12/02/2020 76 <150 MG/DL Final     Triglycerides:  Goal < 400   TG lab ordered for tomorrow AM  Patient is not currently receiving propofol  Continue standard lipid replacement at this time     LIVER FUNCTION LAB EVALUATION  Recent Labs     08/25/21  0904   AST 21   ALT 32   ALKPHOS 102   BILITOT 1.0   INR 1.09     Hepatic Function:  AST/ALT WNL   Total Bilirubin < 3.0; Trace elements added to TPN    RENAL LAB EVALUATION  Estimated Creatinine Clearance: 142 mL/min (A) (based on SCr of 0.5 mg/dL (L)). Recent Labs     08/24/21 1810 08/25/21  0904 08/26/21  1101   BUN 9 9 8   CREATININE 0.7 0.6 0.5*     Renal Function and Electrolytes:  K - little low @ 3.4  Mg - WNL  Phos - littlw low @ 2.3  Renal function stable at this time    Formulation: started on AA/Dex 4.25/10 TPN with Standard Electrolytes at initial infusion rate of 42 ml/hour, and Fat Emulsion 20%, 250ml four times a day.     +Standard Electrolytes per 2,000ml Bag+  30meq Sodium Acetate  40meq Sodium Chloride  NO Sodium Phosphate  NO Potassium Acetate  20meq Potassium Chloride  30mmol (44meq) Potassium Phosphate  2gm Calcium Gluconate  1gm Magnesium Sulfate        Pharmacy will continue to follow and adjust as appropriate.    Thank you for the consult,    Cris Weems  8/26/2021  6:04 PM

## 2021-08-26 NOTE — PROGRESS NOTES
General Surgery-Dr. Corinne Canton Day: 3    Chief Complaint on Admission: nausea, emesis, concern for portal venous thrombus      Subjective:     Feels better with IVF. Still with some nausea and dry heaving. Tolerating some sips of clears. Denies BM. Was seen by GI and by Hematology   Denies F/C    ROS:  Review of Systems   Constitutional: Positive for appetite change and fatigue. HENT: Negative. Eyes: Negative. Respiratory: Negative. Cardiovascular: Negative. Gastrointestinal: Positive for nausea and vomiting (more dry heaving). Endocrine: Negative. Genitourinary: Negative. Musculoskeletal: Negative. Skin: Negative. Allergic/Immunologic: Negative. Neurological: Negative. Hematological: Negative. Psychiatric/Behavioral: Negative. Allergies  Levofloxacin          Diagnosis Date    COVID-19     \"had + covid test 2021- only symptom was headache\"- negative covid test 2021    H/O echocardiogram 2021    Mild LVH, AR, TR & MR.    History of kidney stones     last stone:     Wears dentures     full upper plate    Wears glasses     to read       Objective:     Vitals:    21 0855   BP: 126/83   Pulse: 73   Resp: 16   Temp: 98 °F (36.7 °C)   SpO2: 97%       TEMPERATURE:  Current -Temp: 98 °F (36.7 °C); Max - Temp  Av.1 °F (36.7 °C)  Min: 98 °F (36.7 °C)  Max: 98.3 °F (36.8 °C)    No intake/output data recorded. No intake/output data recorded. Physical Exam:  Physical Exam  Vitals reviewed. Constitutional:       General: She is not in acute distress. Appearance: She is obese. She is not ill-appearing, toxic-appearing or diaphoretic. Comments:  at bedside     HENT:      Head: Normocephalic and atraumatic. Right Ear: External ear normal.      Left Ear: External ear normal.      Nose: Nose normal.   Eyes:      General:         Right eye: No discharge. Left eye: No discharge.       Extraocular Movements: Extraocular movements intact. Cardiovascular:      Rate and Rhythm: Normal rate. Pulmonary:      Effort: Pulmonary effort is normal.   Abdominal:      Palpations: Abdomen is soft. Tenderness: There is no abdominal tenderness. There is no guarding or rebound. Comments: Incisions healing appropriately    Musculoskeletal:         General: Normal range of motion. Skin:     General: Skin is warm. Neurological:      General: No focal deficit present. Mental Status: She is alert. Psychiatric:         Mood and Affect: Mood normal.           Scheduled Meds:   fleet  1 enema Rectal Once    sennosides-docusate sodium  2 tablet Oral Daily    polyethylene glycol  17 g Oral Daily    bisacodyl  10 mg Rectal Daily    ondansetron  4 mg IntraVENous Q6H    enoxaparin  1 mg/kg Subcutaneous BID    sodium chloride flush  5-40 mL IntraVENous 2 times per day    pantoprazole  40 mg IntraVENous Daily     ContinuousInfusions:   sodium chloride      sodium chloride       PRN Meds:morphine, sodium chloride flush, sodium chloride, polyethylene glycol, acetaminophen **OR** acetaminophen, traMADol      Labs/Imaging Results:   Lab Results   Component Value Date    WBC 3.1 (L) 08/26/2021    HGB 13.9 08/26/2021    HCT 44.0 08/26/2021    MCV 92.8 08/26/2021     08/26/2021     Lab Results   Component Value Date     08/26/2021    K 3.4 (L) 08/26/2021     08/26/2021    CO2 22 08/26/2021    BUN 8 08/26/2021    CREATININE 0.5 (L) 08/26/2021    GLUCOSE 85 08/26/2021    CALCIUM 9.2 08/26/2021    PROT 6.3 (L) 08/25/2021    LABALBU 3.3 (L) 08/26/2021    BILITOT 1.0 08/25/2021    ALKPHOS 102 08/25/2021    AST 21 08/25/2021    ALT 32 08/25/2021    LABGLOM >60 08/26/2021    GFRAA >60 08/26/2021       Assessment:       50 y/o F with nausea and dry heaving, concern for new portal vein thrombus    Plan:       -Appreciate Heme and GI eval and recs. -F/u pending labs.    -Will check UGI (sleeve stomach looked WNL on recent CT A/P with PO contrast as well as previous CT A/P without contrast). If UGI is WNL then may require EGD. -If pt unable to tolerate adequate PO, she may require a PICC and set up with [facility] for IVF at home until nausea / emesis resolves. D/w pt and her  at bedside. Spent 45+ minutes discussing all the above information. Answered all their questions. Electronically signed by Nicol Mistry II, MD on 8/26/2021 at 12:10 PM       Addendum:    Pt had UGI. Reviewed. No abnormality noted. Suspect persistent nausea and emesis is related to portal vein thrombus. Will place order for PICC and TPN. Will need up to 4 weeks of TPN at home. If can get set up in next 24 hours and rest of workup is done, planned d/c by primary tomorrow. Plan d/w pt and her .     Selina Horan MD

## 2021-08-26 NOTE — CONSULTS
ONCOLOGY HEMATOLOGY CARE ([facility])  CONSULTATION REPORT    REASON FOR CONSULT    Portal vein thrombosis    CHIEF COMPLAINT    Chief Complaint   Patient presents with    Emesis     post op from a gastric sleeve    Nausea       HISTORY OF PRESENT ILLNESS   Estuardo Kendrick is a 49 y.o. female who presents with persistent nausea and vomiting. She had laparoscopic gastric sleeve 8/4/21. She was then seen in the ED 8/17/21 for abdominal pain. She reports over past three days she has had worsening nausea with frequent vomiting associated with abdominal pain. 8/25/21 CT abdomen/pelvis with findings suspicious for new left portal vein thrombus. Follow up 8/25 US  Impression  The left portal vein thrombosis from the CT earlier today is not identified  at ultrasound. The imaged portions of the main portal vein and left portal  vein demonstrate patency and appropriate direction of flow. Findings could  represent partial resolution of thrombus. Continued follow-up recommended. She denies previous blood clot. No family history of blood clots. Due to portal vein thrombosis we were called to evaluate. PAST MEDICAL HISTORY    Past Medical History:   Diagnosis Date    COVID-19     \"had + covid test 4/2021- only symptom was headache\"- negative covid test 5/21/2021    H/O echocardiogram 02/25/2021    Mild LVH, AR, TR & MR. History of kidney stones     last stone: 2006    Wears dentures     full upper plate    Wears glasses     to read       SURGICAL HISTORY    Past Surgical History:   Procedure Laterality Date    APPENDECTOMY  2015?     CHOLECYSTECTOMY  1987    CYSTOSCOPY  2007?    stone manipulation    [address]    HYSTERECTOMY  2007    LITHOTRIPSY      ( not sure if had this done-per pt)    SLEEVE GASTRECTOMY N/A 8/4/2021    GASTRECTOMY SLEEVE LAPAROSCOPIC ROBOTIC  POSS HIATAL HERNIA REPAIR performed by Pernell Rust MD at [facility] 5/28/2021    EGD BIOPSY performed by Keyonna Liriano MD at [facility] N/A 8/4/2021    EGD ESOPHAGOGASTRODUODENOSCOPY performed by Keyonna Liriano MD at [address] [facility]    Family History   Problem Relation Age of Onset    Asthma Mother         copd    Heart Disease Mother         chf    Stroke Father        SOCIAL HISTORY    Social History     Socioeconomic History    Marital status:      Spouse name: None    Number of children: None    Years of education: None    Highest education level: None   Occupational History    None   Tobacco Use    Smoking status: Never Smoker    Smokeless tobacco: Never Used   Vaping Use    Vaping Use: Never used   Substance and Sexual Activity    Alcohol use: No     Comment: average \"1-2 times per year    Drug use: No    Sexual activity: Yes   Other Topics Concern    None   Social History Narrative    None     Social Determinants of Health     Financial Resource Strain:     Difficulty of Paying Living Expenses:    Food Insecurity:     Worried About Running Out of Food in the Last Year:     Ran Out of Food in the Last Year:    Transportation Needs:     Lack of Transportation (Medical):     Lack of Transportation (Non-Medical):    Physical Activity:     Days of Exercise per Week:     Minutes of Exercise per Session:    Stress:     Feeling of Stress :    Social Connections:     Frequency of Communication with Friends and Family:     Frequency of Social Gatherings with Friends and Family:     Attends Restoration Services:      Active Member of Clubs or Organizations:     Attends Club or Organization Meetings:     Marital Status:    Intimate Partner Violence:     Fear of Current or Ex-Partner:     Emotionally Abused:     Physically Abused:     Sexually Abused:        REVIEW OF SYSTEMS    Constitutional:  Denies fever, chills, loss of appetite, weight loss, tiredness, fatigue or weakness   HEENT:  Denies swelling of neck glands  Respiratory:  Denies cough, shortness of breath or hemoptysis  Cardiovascular:  Denies chest pain, palpitations or swelling   GI:  + abdominal discomfort, + nausea, +vomiting,no  constipation or diarrhea   Musculoskeletal:  Denies back pain   Skin:  Denies rash   Neurologic:  Denies headache, focal weakness or sensory changes   All systems negative except as marked. PHYSICAL EXAM    Vitals: BP (!) 112/59   Pulse 85   Temp 98.3 °F (36.8 °C) (Oral)   Resp 16   Ht 5' 1\" (1.549 m)   Wt 210 lb (95.3 kg)   SpO2 99%   BMI 39.68 kg/m²   CONSTITUTIONAL: awake, alert, cooperative, no apparent distress   EYES: sclera clear and conjunctiva normal  ENT: Normocephalic, without obvious abnormality, atraumatic  NECK: supple, symmetrical  HEMATOLOGIC/LYMPHATIC: no cervical, supraclavicular or axillary lymphadenopathy   LUNGS: no increased work of breathing and clear to auscultation   CARDIOVASCULAR: regular rate and rhythm, normal S1 and S2, no murmur noted  ABDOMEN: normal bowel sounds x 4, soft, non-distended, non-tender, no masses palpated, no hepatosplenomgaly   MUSCULOSKELETAL: full range of motion noted, tone is normal  NEUROLOGIC: awake, alert, oriented to name, place and time. Motor skills grossly intact. SKIN: Normal skin color, texture, turgor and no jaundice. Skin appears intact   EXTREMITIES: no LE edema, no cyanosis, no leg swelling, no clubbing    LABORATORY RESULTS  CBC:   Recent Labs     08/24/21 1810 08/25/21  0904   WBC 5.3 4.3   HGB 14.7 13.7    122*     BMP:    Recent Labs     08/24/21 1810 08/25/21  0904    139   K 4.1 3.2*   CL 98* 102   CO2 21 21   BUN 9 9   CREATININE 0.7 0.6   GLUCOSE 102* 93     Hepatic:   Recent Labs     08/24/21 1810 08/25/21  0904   AST 18 21   ALT 31 32   BILITOT 1.2* 1.0   ALKPHOS 82 102     INR:   Recent Labs     08/25/21  0904   INR 1.09         ASSESSMENT    ?  Portal vein thrombosis    RECOMMENDATION    We recommend to pursue hypercoagulable studies at this time. While she has risk factors, including recent surgery, obesity and immobility, they may not be enough to explain portal vein thrombosis. We would like to rule out acquired or hereditary thrombophilia. We will plan for at least six months of anticoagulation if hypercoagulable work ups are negative. If she is found to have hypercoagulable state, we will recommend long term AC tehrapy. We would recommend to transition to Xarelto. We recommend to increase dose of ODT zofran to 8 mg every 8 hours for her nausea. She will need to follow up at the cancer center as an outpatient. We will follow the patient. Thank you for allowing me to participate in the care of this very pleasant patient. I have independently evaluated and examined this patient today. I have reviewed radiologic and biochemical tests on this patient. Management Plan is developed mutually with Rosie Rea NP.  I have reviewed above note and agree with assessment and plan

## 2021-08-26 NOTE — PROGRESS NOTES
Hospitalist Progress Note      Name:  Jose Zimmer /Age/Sex: 1970  (49 y.o. female)   MRN & CSN:  [id number] & [id number] Admission Date/Time: 2021 10:37 PM   Location:  [id number] PCP: Meenu Burgos MD         Hospital Day: 3    Assessment and Plan:   Jose Zimmer is a 49 y.o.  female  who presents with Intractable nausea and vomiting    Intractable nausea and vomiting  Abdominal pain S/P gastric sleeve 21  IV fluid hydration x 10 hours  clears  Consult GI-rec's appreciated  Zofran & Compazine prn  Monitor labs for metabolic acidosis resolution    General surgery following  Pending upper GI     Portal venous thrombosis       Ct abd/pelvis () shows suspicion for left portal vein thrombus, this is new from the recent Ct scan completed on         RUQ US () dose not demonstrate a hepatic portal vein thrombus, blood flow in the appropriate direction, may represent a partially resolved thrombus         Consult Hem/Oc-initiate Lovenox with bridge to Xarelto           Hypercoag panel pending prior to initiating xarelto    Constipation        Chronic issue               Evaluated by GI-rec's for fleets enema        Requesting pain medication prior to enema-ordered            Morbid Obesity         BMI 39.68         Encourage healthy lifestyle and eating habits      This patient was seen and examined autonomously  A hospitalist attending physician was available for questions/consultation as needed. Diet ADULT DIET; Clear Liquid   DVT Prophylaxis [x] Lovenox, []  Heparin, [] SCDs, [] Warfarin  [] NOAC     GI Prophylaxis [x] PPI,  [] H2 Blocker,  [] Carafate,  [] Diet/Tube Feeds   Code Status Full Code     History of Present Illness:     Chief Complaint: Intractable nausea and vomiting    Reports continued nausea and abdominal pain.  No significant overnight events      Ten point ROS reviewed negative, unless as noted above    Objective:   No intake or output data in the 24 hours ending 08/26/21 1054   Vitals:   Vitals:    08/26/21 0855   BP: 126/83   Pulse: 73   Resp: 16   Temp: 98 °F (36.7 °C)   SpO2: 97%     Physical Exam:   GEN Awake female, sitting upright in bed in no apparent distress. Appears given age. EYES Pupils are equally round. No scleral erythema, discharge, or conjunctivitis. HENT Mucous membranes are moist. Oral pharynx without exudates, no evidence of thrush. NECK Supple, no apparent thyromegaly or masses. RESP Clear to auscultation, no wheezes, rales or rhonchi. Symmetric chest movement while on room air. CARDIO/VASC S1/S2 auscultated. Regular rate without appreciable murmurs, rubs, or gallops. No JVD or carotid bruits. Peripheral pulses equal bilaterally and palpable. No peripheral edema. GI Abdomen is soft without significant tenderness, masses, or guarding. Bowel sounds are normoactive. Rectal exam deferred. Incisions appear well healing   MSK No gross joint deformities. SKIN Normal coloration, warm, dry. NEURO Cranial nerves appear grossly intact, normal speech, no lateralizing weakness. PSYCH Awake, alert, oriented x 4. Affect appropriate.     Medications:   Medications:    fleet  1 enema Rectal Once    sennosides-docusate sodium  2 tablet Oral Daily    polyethylene glycol  17 g Oral Daily    enoxaparin  1 mg/kg Subcutaneous BID    sodium chloride flush  5-40 mL IntraVENous 2 times per day    pantoprazole  40 mg IntraVENous Daily      Infusions:    sodium chloride      sodium chloride       PRN Meds: ondansetron, 8 mg, Q8H PRN  morphine, 2 mg, Q4H PRN  sodium chloride flush, 5-40 mL, PRN  sodium chloride, 25 mL, PRN  polyethylene glycol, 17 g, Daily PRN  acetaminophen, 650 mg, Q6H PRN   Or  acetaminophen, 650 mg, Q6H PRN  prochlorperazine, 10 mg, Q6H PRN  traMADol, 50 mg, Q6H PRN        Recent Labs     08/24/21 1810 08/25/21  0904   WBC 5.3 4.3   HGB 14.7 13.7   HCT 45.7 41.7    122*      Recent Labs     08/24/21 1810 08/25/21  0904    139   K 4.1 3.2*   CL 98* 102   CO2 21 21   BUN 9 9   CREATININE 0.7 0.6     Recent Labs     08/24/21  1810 08/25/21  0904   AST 18 21   ALT 31 32   BILIDIR  --  0.4*   BILITOT 1.2* 1.0   ALKPHOS 82 102     Recent Labs     08/25/21  0904   INR 1.09          Imaging reviewed      Electronically signed by UZMA Krueger CNP on 8/26/2021 at 10:54 AM

## 2021-08-26 NOTE — CARE COORDINATION
Chart reviewed and met w/ pt to initiate discharge planning. CM introduced self and explained role. Pt is from home w/ her  and was independent PTA. Pt has PCP, added to chart. Pt has insurance with affordable RX copays. Pt declined any needs from CM at this time for discharge. Pt did ask to talk to someone about how she was treated in the ED. CM called [address] [place] [address], patient advocate, to inform him that pt would like to talk to him. CM available should needs present for discharge.

## 2021-08-27 PROBLEM — E43 SEVERE MALNUTRITION (HCC): Chronic | Status: ACTIVE | Noted: 2021-08-27

## 2021-08-27 LAB
ANION GAP SERPL CALCULATED.3IONS-SCNC: 13 MMOL/L (ref 4–16)
APTT: 36 SECONDS (ref 25.1–37.1)
BUN BLDV-MCNC: 10 MG/DL (ref 6–23)
CALCIUM SERPL-MCNC: 8.9 MG/DL (ref 8.3–10.6)
CHLORIDE BLD-SCNC: 102 MMOL/L (ref 99–110)
CO2: 25 MMOL/L (ref 21–32)
CREAT SERPL-MCNC: 0.5 MG/DL (ref 0.6–1.1)
GFR AFRICAN AMERICAN: >60 ML/MIN/1.73M2
GFR NON-AFRICAN AMERICAN: >60 ML/MIN/1.73M2
GLUCOSE BLD-MCNC: 131 MG/DL (ref 70–99)
GLUCOSE BLD-MCNC: 138 MG/DL (ref 70–99)
GLUCOSE BLD-MCNC: 141 MG/DL (ref 70–99)
INR BLD: 1.06 INDEX
LIPASE: 184 IU/L (ref 13–60)
MAGNESIUM: 1.7 MG/DL (ref 1.8–2.4)
PHOSPHORUS: 2.4 MG/DL (ref 2.5–4.9)
POTASSIUM SERPL-SCNC: 3 MMOL/L (ref 3.5–5.1)
POTASSIUM SERPL-SCNC: 3.3 MMOL/L (ref 3.5–5.1)
PROTHROMBIN TIME: 13.7 SECONDS (ref 11.7–14.5)
SODIUM BLD-SCNC: 140 MMOL/L (ref 135–145)
TRIGL SERPL-MCNC: 84 MG/DL

## 2021-08-27 PROCEDURE — 6360000002 HC RX W HCPCS: Performed by: SURGERY

## 2021-08-27 PROCEDURE — 83735 ASSAY OF MAGNESIUM: CPT

## 2021-08-27 PROCEDURE — 96376 TX/PRO/DX INJ SAME DRUG ADON: CPT

## 2021-08-27 PROCEDURE — 1200000000 HC SEMI PRIVATE

## 2021-08-27 PROCEDURE — 36569 INSJ PICC 5 YR+ W/O IMAGING: CPT

## 2021-08-27 PROCEDURE — 76937 US GUIDE VASCULAR ACCESS: CPT

## 2021-08-27 PROCEDURE — 2580000003 HC RX 258: Performed by: STUDENT IN AN ORGANIZED HEALTH CARE EDUCATION/TRAINING PROGRAM

## 2021-08-27 PROCEDURE — 6360000002 HC RX W HCPCS: Performed by: STUDENT IN AN ORGANIZED HEALTH CARE EDUCATION/TRAINING PROGRAM

## 2021-08-27 PROCEDURE — 82962 GLUCOSE BLOOD TEST: CPT

## 2021-08-27 PROCEDURE — 84100 ASSAY OF PHOSPHORUS: CPT

## 2021-08-27 PROCEDURE — 85730 THROMBOPLASTIN TIME PARTIAL: CPT

## 2021-08-27 PROCEDURE — G0378 HOSPITAL OBSERVATION PER HR: HCPCS

## 2021-08-27 PROCEDURE — 83690 ASSAY OF LIPASE: CPT

## 2021-08-27 PROCEDURE — C1751 CATH, INF, PER/CENT/MIDLINE: HCPCS

## 2021-08-27 PROCEDURE — 85610 PROTHROMBIN TIME: CPT

## 2021-08-27 PROCEDURE — 96366 THER/PROPH/DIAG IV INF ADDON: CPT

## 2021-08-27 PROCEDURE — 84132 ASSAY OF SERUM POTASSIUM: CPT

## 2021-08-27 PROCEDURE — 96372 THER/PROPH/DIAG INJ SC/IM: CPT

## 2021-08-27 PROCEDURE — 96365 THER/PROPH/DIAG IV INF INIT: CPT

## 2021-08-27 PROCEDURE — 2500000003 HC RX 250 WO HCPCS: Performed by: SURGERY

## 2021-08-27 PROCEDURE — 80048 BASIC METABOLIC PNL TOTAL CA: CPT

## 2021-08-27 PROCEDURE — 96368 THER/DIAG CONCURRENT INF: CPT

## 2021-08-27 PROCEDURE — 84478 ASSAY OF TRIGLYCERIDES: CPT

## 2021-08-27 PROCEDURE — 99024 POSTOP FOLLOW-UP VISIT: CPT | Performed by: SURGERY

## 2021-08-27 PROCEDURE — 94761 N-INVAS EAR/PLS OXIMETRY MLT: CPT

## 2021-08-27 PROCEDURE — 6360000002 HC RX W HCPCS: Performed by: NURSE PRACTITIONER

## 2021-08-27 PROCEDURE — 6370000000 HC RX 637 (ALT 250 FOR IP): Performed by: NURSE PRACTITIONER

## 2021-08-27 PROCEDURE — C9113 INJ PANTOPRAZOLE SODIUM, VIA: HCPCS | Performed by: STUDENT IN AN ORGANIZED HEALTH CARE EDUCATION/TRAINING PROGRAM

## 2021-08-27 RX ORDER — POTASSIUM CHLORIDE 29.8 MG/ML
20 INJECTION INTRAVENOUS PRN
Status: DISCONTINUED | OUTPATIENT
Start: 2021-08-27 | End: 2021-08-28 | Stop reason: HOSPADM

## 2021-08-27 RX ORDER — SCOLOPAMINE TRANSDERMAL SYSTEM 1 MG/1
1 PATCH, EXTENDED RELEASE TRANSDERMAL
Status: DISCONTINUED | OUTPATIENT
Start: 2021-08-27 | End: 2021-08-28 | Stop reason: HOSPADM

## 2021-08-27 RX ORDER — MAGNESIUM SULFATE 1 G/100ML
1000 INJECTION INTRAVENOUS ONCE
Status: COMPLETED | OUTPATIENT
Start: 2021-08-27 | End: 2021-08-27

## 2021-08-27 RX ORDER — POTASSIUM CHLORIDE 7.45 MG/ML
10 INJECTION INTRAVENOUS PRN
Status: DISCONTINUED | OUTPATIENT
Start: 2021-08-27 | End: 2021-08-28 | Stop reason: HOSPADM

## 2021-08-27 RX ORDER — MAGNESIUM SULFATE 1 G/100ML
1000 INJECTION INTRAVENOUS PRN
Status: DISCONTINUED | OUTPATIENT
Start: 2021-08-27 | End: 2021-08-28 | Stop reason: HOSPADM

## 2021-08-27 RX ADMIN — MAGNESIUM SULFATE HEPTAHYDRATE 1000 MG: 1 INJECTION, SOLUTION INTRAVENOUS at 13:08

## 2021-08-27 RX ADMIN — POTASSIUM CHLORIDE 20 MEQ: 29.8 INJECTION INTRAVENOUS at 13:06

## 2021-08-27 RX ADMIN — ONDANSETRON 4 MG: 2 INJECTION INTRAMUSCULAR; INTRAVENOUS at 00:41

## 2021-08-27 RX ADMIN — POTASSIUM CHLORIDE 20 MEQ: 29.8 INJECTION INTRAVENOUS at 23:32

## 2021-08-27 RX ADMIN — POTASSIUM CHLORIDE 20 MEQ: 29.8 INJECTION INTRAVENOUS at 15:09

## 2021-08-27 RX ADMIN — ONDANSETRON 4 MG: 2 INJECTION INTRAMUSCULAR; INTRAVENOUS at 08:08

## 2021-08-27 RX ADMIN — POTASSIUM CHLORIDE 20 MEQ: 29.8 INJECTION INTRAVENOUS at 14:11

## 2021-08-27 RX ADMIN — ONDANSETRON 4 MG: 2 INJECTION INTRAMUSCULAR; INTRAVENOUS at 13:04

## 2021-08-27 RX ADMIN — SODIUM CHLORIDE, PRESERVATIVE FREE 10 ML: 5 INJECTION INTRAVENOUS at 22:36

## 2021-08-27 RX ADMIN — ASCORBIC ACID, VITAMIN A PALMITATE, CHOLECALCIFEROL, THIAMINE HYDROCHLORIDE, RIBOFLAVIN-5 PHOSPHATE SODIUM, PYRIDOXINE HYDROCHLORIDE, NIACINAMIDE, DEXPANTHENOL, ALPHA-TOCOPHEROL ACETATE, VITAMIN K1, FOLIC ACID, BIOTIN, CYANOCOBALAMIN: 200; 3300; 200; 6; 3.6; 6; 40; 15; 10; 150; 600; 60; 5 INJECTION, SOLUTION INTRAVENOUS at 20:57

## 2021-08-27 RX ADMIN — ENOXAPARIN SODIUM 100 MG: 100 INJECTION SUBCUTANEOUS at 20:57

## 2021-08-27 RX ADMIN — MORPHINE SULFATE 2 MG: 4 INJECTION INTRAVENOUS at 19:37

## 2021-08-27 RX ADMIN — ONDANSETRON 4 MG: 2 INJECTION INTRAMUSCULAR; INTRAVENOUS at 19:37

## 2021-08-27 RX ADMIN — PANTOPRAZOLE SODIUM 40 MG: 40 INJECTION, POWDER, FOR SOLUTION INTRAVENOUS at 08:08

## 2021-08-27 RX ADMIN — SODIUM CHLORIDE, PRESERVATIVE FREE 10 ML: 5 INJECTION INTRAVENOUS at 08:13

## 2021-08-27 RX ADMIN — I.V. FAT EMULSION 250 ML: 20 EMULSION INTRAVENOUS at 20:57

## 2021-08-27 RX ADMIN — ENOXAPARIN SODIUM 100 MG: 100 INJECTION SUBCUTANEOUS at 08:09

## 2021-08-27 ASSESSMENT — PAIN SCALES - GENERAL
PAINLEVEL_OUTOF10: 7
PAINLEVEL_OUTOF10: 6
PAINLEVEL_OUTOF10: 0
PAINLEVEL_OUTOF10: 0

## 2021-08-27 ASSESSMENT — ENCOUNTER SYMPTOMS
ALLERGIC/IMMUNOLOGIC NEGATIVE: 1
EYES NEGATIVE: 1
NAUSEA: 1
RESPIRATORY NEGATIVE: 1
VOMITING: 1

## 2021-08-27 NOTE — PROGRESS NOTES
Pharmacy to dose TPN per Dr. Tony Johnson Day #2    Indication: intractable Nausea and Vomiting    Goal: to be determined    Central line: Double lumen PICC    Diet: Bariatric Clear Liquid    Maintenance Fluids: 0.9% NS @ 75 ml/hour       GLUCOSE LEVELS LAST 72 HOURS                  (last 7 readings)    Recent Labs     08/24/21 1810 08/25/21  0904 08/26/21  1101 08/27/21  0850   GLUCOSE 102* 93 85 131*     Glucose:   Goal <180    No readings above goal    No sliding scale insulin order, POC glucose q6h    No insulin in the TPN formula. TRIGLYCERIDES:  Triglycerides   Date Value Ref Range Status   08/27/2021 84 <150 MG/DL Final   12/02/2020 76 <150 MG/DL Final     Triglycerides:   Goal < 400    TG level @84 today, at goal   Patient is not currently receiving propofol   Continue standard lipid replacement at this time       LIVER FUNCTION LAB EVALUATION  Recent Labs     08/25/21  0904 08/27/21  0520   AST 21  --    ALT 32  --    ALKPHOS 102  --    BILITOT 1.0  --    INR 1.09 1.06     Hepatic Function:   WNL   Total Bilirubin < 3.0; Trace elements added to TPN      TPN LAB EVALUATION  Recent Labs     08/24/21 1810 08/24/21 1810 08/25/21  0904 08/25/21  0904 08/26/21  1101 08/26/21  1101 08/27/21  0850      < > 139   < > 142   < > 140   K 4.1   < > 3.2*   < > 3.4*   < > 3.0*   CL 98*   < > 102   < > 102   < > 102   CALCIUM 9.6   < > 9.0   < > 9.2   < > 8.9   MG  --   --   --   --  1.8   < > 1.7*   PHOS  --   --   --   --  2.3*   < > 2.4*   GLUCOSE 102*  --  93  --  85  --  131*    < > = values in this interval not displayed. RENAL LAB EVALUATION  Estimated Creatinine Clearance: 142 mL/min (A) (based on SCr of 0.5 mg/dL (L)).   Recent Labs     08/25/21  0904 08/26/21  1101 08/27/21  0850   BUN 9 8 10   CREATININE 0.6 0.5* 0.5*     Renal Function and Electrolytes:   Na - WNL   K - low @3.0 - Potassium protocol ordered outside of TPN   Ca - WNL   Mg - slightly low - will replace with magnesium sulfate 1gm ivpb outside of TPN   Phos - slightly low - slowly trending up, TPN rate to be increased tonight.  Renal function remains stable at this time, no UOP data    Formulation: Change to AA 5% + DEX 15% at an increased rate of 75 ml/hr now that a central line is in place. Standard lipids 4 times weekly. Component Order Dose Admin Dose   CLINIMIX E 5/15 5 % Soln 2,000 mLs 2,000 mL   adult multi-vitamin with vitamin k Inj 10 mLs 10 mL   trace minerals Cu-Mn-Se-Zn 876-70- MCG/ML Soln 1 mL        Pharmacy will continue to follow and adjust as appropriate. Thank you for the consult,    Nilay Azul.  Gloria Wallis, [facility]    8/27/2021  11:18 AM

## 2021-08-27 NOTE — CARE COORDINATION
CM was updated by Jorge Luis Florez that pt will need HHC and TPN set up. CM met w/ pt and explained the need for [employer] for TPN. Pt declined ever having HHC previously and states she has no preference in [employer] companies at this time. Referral sent to [facility] liaison.

## 2021-08-27 NOTE — PROGRESS NOTES
Hospitalist Progress Note      Name:  Annabelle Carranza /Age/Sex: 1970  (49 y.o. female)   MRN & CSN:  [id number] & [id number] Admission Date/Time: 2021 10:37 PM   Location:  [facility]  PCP: Delta Saha MD         Hospital Day: 4    Assessment and Plan:   Annabelle Carranza is a 49 y.o.  female  who presents with Intractable nausea and vomiting    Intractable nausea and vomiting  Abdominal pain S/P gastric sleeve 21  Clears- remains unable to tolerate anything PO   GI following   Zofran & Compazine prn. Scopolamine patch  Monitor labs for metabolic acidosis resolution    General surgery following  Upper GI () normal post surgical changes  PICC line placed    CM to assist with TPN and HHC orders     Portal venous thrombosis       Ct abd/pelvis () shows suspicion for left portal vein thrombus, this is new from the recent Ct scan completed on         RUQ US () dose not demonstrate a hepatic portal vein thrombus, blood flow in the appropriate direction, may represent a partially resolved thrombus         Consult Hem/Onc-initiate Lovenox with bridge to Xarelto           Hypercoag panel pending prior to initiating xarelto         Pending results of hyper coag panel and unable to tolerate PO medication ()    Electrolyte abnormalities  Hypokalemia (3.0)  Hypomagnesemia (1.7)   Replacement ordered   Monitor trends    Constipation-improved        Chronic issue               Several BMs with fleet enema             Morbid Obesity         BMI 39.68         Encourage healthy lifestyle and eating habits      This patient was seen and examined autonomously  A hospitalist attending physician was available for questions/consultation as needed. Diet BARIATRIC DIET;  Bariatric Clear Liquid  PN-Adult Premix  4.25/10 - Standard Electrolytes - Peripheral Line   DVT Prophylaxis [x] Lovenox, []  Heparin, [] SCDs, [] Warfarin  [] NOAC     GI Prophylaxis [x] PPI,  [] H2 Blocker,  [] Carafate,  [] Diet/Tube Feeds   Code Status Full Code     History of Present Illness:     Chief Complaint: Intractable nausea and vomiting    Reports improved nausea and abdominal pain. No significant overnight events. 2 BMs this morning. Discussed D/C planning. Reports apprehension regarding discharge today d/t unable to tolerate anything by mouth and taking anticoagulation. Ten point ROS reviewed negative, unless as noted above    Objective: Intake/Output Summary (Last 24 hours) at 8/27/2021 0912  Last data filed at 8/26/2021 2311  Gross per 24 hour   Intake 688 ml   Output --   Net 688 ml      Vitals:   Vitals:    08/27/21 0752   BP: 124/70   Pulse: 79   Resp: 16   Temp: 98.4 °F (36.9 °C)   SpO2:      Physical Exam:   GEN Awake female, sitting upright in bed in no apparent distress. Appears given age. EYES Pupils are equally round. No scleral erythema, discharge, or conjunctivitis. HENT Mucous membranes are moist. Oral pharynx without exudates, no evidence of thrush. NECK Supple, no apparent thyromegaly or masses. RESP Clear to auscultation, no wheezes, rales or rhonchi. Symmetric chest movement while on room air. CARDIO/VASC S1/S2 auscultated. Regular rate without appreciable murmurs, rubs, or gallops. No JVD or carotid bruits. Peripheral pulses equal bilaterally and palpable. No peripheral edema. GI Abdomen is soft without significant tenderness, masses, or guarding. Bowel sounds are normoactive. Rectal exam deferred. Incisions appear well healing   MSK No gross joint deformities. SKIN Normal coloration, warm, dry. NEURO Cranial nerves appear grossly intact, normal speech, no lateralizing weakness. PSYCH Awake, alert, oriented x 4. Affect appropriate.     Medications:   Medications:    scopolamine  1 patch Transdermal Q72H    sennosides-docusate sodium  2 tablet Oral Daily    polyethylene glycol  17 g Oral Daily    bisacodyl  10 mg Rectal Daily    fat emulsion  250 mL IntraVENous Once per day on Mon Tue Thu Fri    enoxaparin  1 mg/kg Subcutaneous BID    sodium chloride flush  5-40 mL IntraVENous 2 times per day    pantoprazole  40 mg IntraVENous Daily      Infusions:    sodium chloride 75 mL/hr at 08/26/21 1216    PN-Adult Premix  4.25/10 - Standard Electrolytes - Peripheral Line 42 mL/hr at 08/26/21 2008    sodium chloride       PRN Meds: morphine, 2 mg, Q4H PRN  ondansetron, 4 mg, Q6H PRN  sodium chloride flush, 5-40 mL, PRN  sodium chloride, 25 mL, PRN  polyethylene glycol, 17 g, Daily PRN  acetaminophen, 650 mg, Q6H PRN   Or  acetaminophen, 650 mg, Q6H PRN  traMADol, 50 mg, Q6H PRN        Recent Labs     08/24/21 1810 08/25/21  0904 08/26/21  1101   WBC 5.3 4.3 3.1*   HGB 14.7 13.7 13.9   HCT 45.7 41.7 44.0    122* 156      Recent Labs     08/24/21 1810 08/25/21  0904 08/26/21  1101    139 142   K 4.1 3.2* 3.4*   CL 98* 102 102   CO2 21 21 22   PHOS  --   --  2.3*   BUN 9 9 8   CREATININE 0.7 0.6 0.5*     Recent Labs     08/24/21  1810 08/25/21  0904   AST 18 21   ALT 31 32   BILIDIR  --  0.4*   BILITOT 1.2* 1.0   ALKPHOS 82 102     Recent Labs     08/25/21  0904 08/27/21  0520   INR 1.09 1.06          Imaging reviewed      Electronically signed by UZMA Hammonds - CNP on 8/27/2021 at 9:12 AM

## 2021-08-27 NOTE — PROGRESS NOTES
General Surgery-Dr. Mark Gates Day: 4    Chief Complaint on Admission: nausea, emesis, concern for portal venous thrombus      Subjective:     Got PICC. Started on TPN. Feeling a little better. Still with some nausea. Tolerating some sips of clears. +BM x 2  Was seen by GI and by Hematology   Denies F/C    ROS:  Review of Systems   Constitutional: Positive for appetite change and fatigue. HENT: Negative. Eyes: Negative. Respiratory: Negative. Cardiovascular: Negative. Gastrointestinal: Positive for nausea and vomiting (more dry heaving). Endocrine: Negative. Genitourinary: Negative. Musculoskeletal: Negative. Skin: Negative. Allergic/Immunologic: Negative. Neurological: Negative. Hematological: Negative. Psychiatric/Behavioral: Negative. Allergies  Levofloxacin          Diagnosis Date    COVID-19     \"had + covid test 2021- only symptom was headache\"- negative covid test 2021    H/O echocardiogram 2021    Mild LVH, AR, TR & MR.    History of kidney stones     last stone:     Wears dentures     full upper plate    Wears glasses     to read       Objective:     Vitals:    21 1316   BP: 118/79   Pulse: 77   Resp: 18   Temp:    SpO2:        TEMPERATURE:  Current -Temp: 98.4 °F (36.9 °C); Max - Temp  Av °F (36.7 °C)  Min: 97.6 °F (36.4 °C)  Max: 98.4 °F (36.9 °C)    No intake/output data recorded. I/O last 3 completed shifts: In: 688 [I.V.:558]  Out: -       Physical Exam:  Physical Exam  Vitals reviewed. Constitutional:       General: She is not in acute distress. Appearance: She is obese. She is not ill-appearing, toxic-appearing or diaphoretic. Comments:  at bedside     HENT:      Head: Normocephalic and atraumatic. Right Ear: External ear normal.      Left Ear: External ear normal.      Nose: Nose normal.   Eyes:      General:         Right eye: No discharge. Left eye: No discharge. Extraocular Movements: Extraocular movements intact. Cardiovascular:      Rate and Rhythm: Normal rate. Pulmonary:      Effort: Pulmonary effort is normal.   Abdominal:      Palpations: Abdomen is soft. Tenderness: There is no abdominal tenderness. There is no guarding or rebound. Comments: Incisions healing appropriately    Musculoskeletal:         General: Normal range of motion. Skin:     General: Skin is warm. Neurological:      General: No focal deficit present. Mental Status: She is alert.    Psychiatric:         Mood and Affect: Mood normal.           Scheduled Meds:   scopolamine  1 patch Transdermal Q72H    magnesium sulfate  1,000 mg IntraVENous Once    sennosides-docusate sodium  2 tablet Oral Daily    polyethylene glycol  17 g Oral Daily    bisacodyl  10 mg Rectal Daily    fat emulsion  250 mL IntraVENous Once per day on Mon Tue Thu Fri    enoxaparin  1 mg/kg Subcutaneous BID    sodium chloride flush  5-40 mL IntraVENous 2 times per day    pantoprazole  40 mg IntraVENous Daily     ContinuousInfusions:   PN-Adult Premix 5/15 - Standard Electrolytes - Central Line      sodium chloride 75 mL/hr at 08/26/21 1216    PN-Adult Premix  4.25/10 - Standard Electrolytes - Peripheral Line 42 mL/hr at 08/26/21 2008    sodium chloride       PRN Meds:potassium chloride, magnesium sulfate, potassium chloride, morphine, ondansetron, sodium chloride flush, sodium chloride, polyethylene glycol, acetaminophen **OR** acetaminophen, traMADol      Labs/Imaging Results:   Lab Results   Component Value Date    WBC 3.1 (L) 08/26/2021    HGB 13.9 08/26/2021    HCT 44.0 08/26/2021    MCV 92.8 08/26/2021     08/26/2021     Lab Results   Component Value Date     08/27/2021    K 3.0 (LL) 08/27/2021     08/27/2021    CO2 25 08/27/2021    BUN 10 08/27/2021    CREATININE 0.5 (L) 08/27/2021    GLUCOSE 131 (H) 08/27/2021    CALCIUM 8.9 08/27/2021    PROT 6.3 (L) 08/25/2021    LABALBU 3.3 (L) 08/26/2021    BILITOT 1.0 08/25/2021    ALKPHOS 102 08/25/2021    AST 21 08/25/2021    ALT 32 08/25/2021    LABGLOM >60 08/27/2021    GFRAA >60 08/27/2021       Assessment:       50 y/o F with nausea and dry heaving, concern for new portal vein thrombus    Plan:       -Will need TPN at d/c for 3-5 weeks. -Appreciate Medicine, GI, and Heme recs. -Once d/c'd follow up with me in 1-2 weeks.   -D/w pt and Waldo Dietrich MD

## 2021-08-28 VITALS
BODY MASS INDEX: 39.65 KG/M2 | TEMPERATURE: 98.6 F | HEART RATE: 60 BPM | RESPIRATION RATE: 16 BRPM | SYSTOLIC BLOOD PRESSURE: 122 MMHG | WEIGHT: 210 LBS | HEIGHT: 61 IN | DIASTOLIC BLOOD PRESSURE: 61 MMHG | OXYGEN SATURATION: 98 %

## 2021-08-28 LAB
ANION GAP SERPL CALCULATED.3IONS-SCNC: 9 MMOL/L (ref 4–16)
APTT: 33 SECONDS (ref 25.1–37.1)
BUN BLDV-MCNC: 14 MG/DL (ref 6–23)
CALCIUM SERPL-MCNC: 8.5 MG/DL (ref 8.3–10.6)
CHLORIDE BLD-SCNC: 105 MMOL/L (ref 99–110)
CO2: 28 MMOL/L (ref 21–32)
CREAT SERPL-MCNC: 0.5 MG/DL (ref 0.6–1.1)
DILUTE RUSSELL VIPER VENOM TIME: 35 SEC (ref 33–44)
DRVVT 1 TO 1 MIX REFLEX ONLY: NORMAL SEC (ref 33–44)
DRVVT CONFIRMATION TEST: NORMAL RATIO
FACTOR VIII ACTIVITY: 265 % (ref 56–191)
GFR AFRICAN AMERICAN: >60 ML/MIN/1.73M2
GFR NON-AFRICAN AMERICAN: >60 ML/MIN/1.73M2
GLUCOSE BLD-MCNC: 159 MG/DL (ref 70–99)
HCT VFR BLD CALC: 39.7 % (ref 37–47)
HEMOGLOBIN: 12.5 GM/DL (ref 12.5–16)
INR BLD: 1.03 INDEX
MAGNESIUM: 2 MG/DL (ref 1.8–2.4)
MCH RBC QN AUTO: 29.3 PG (ref 27–31)
MCHC RBC AUTO-ENTMCNC: 31.5 % (ref 32–36)
MCV RBC AUTO: 93.2 FL (ref 78–100)
PDW BLD-RTO: 13.8 % (ref 11.7–14.9)
PHOSPHORUS: 2.1 MG/DL (ref 2.5–4.9)
PLATELET # BLD: 140 K/CU MM (ref 140–440)
PMV BLD AUTO: 12.4 FL (ref 7.5–11.1)
POTASSIUM SERPL-SCNC: 3.5 MMOL/L (ref 3.5–5.1)
PROTEIN C ACTIVITY: 128 % (ref 83–168)
PROTEIN S ACTIVITY: 62 % (ref 57–131)
PROTHROMBIN TIME: 13.3 SECONDS (ref 11.7–14.5)
RBC # BLD: 4.26 M/CU MM (ref 4.2–5.4)
RISTOCETIN CO-FACTOR: 411 % (ref 51–215)
SODIUM BLD-SCNC: 142 MMOL/L (ref 135–145)
VON WILLEBRAND AG: 397 % (ref 52–214)
WBC # BLD: 2.9 K/CU MM (ref 4–10.5)

## 2021-08-28 PROCEDURE — 85027 COMPLETE CBC AUTOMATED: CPT

## 2021-08-28 PROCEDURE — 80048 BASIC METABOLIC PNL TOTAL CA: CPT

## 2021-08-28 PROCEDURE — 6360000002 HC RX W HCPCS: Performed by: SURGERY

## 2021-08-28 PROCEDURE — 99231 SBSQ HOSP IP/OBS SF/LOW 25: CPT | Performed by: SPECIALIST

## 2021-08-28 PROCEDURE — 85300 ANTITHROMBIN III ACTIVITY: CPT

## 2021-08-28 PROCEDURE — 36415 COLL VENOUS BLD VENIPUNCTURE: CPT

## 2021-08-28 PROCEDURE — 85730 THROMBOPLASTIN TIME PARTIAL: CPT

## 2021-08-28 PROCEDURE — 6360000002 HC RX W HCPCS: Performed by: STUDENT IN AN ORGANIZED HEALTH CARE EDUCATION/TRAINING PROGRAM

## 2021-08-28 PROCEDURE — 84100 ASSAY OF PHOSPHORUS: CPT

## 2021-08-28 PROCEDURE — 2580000003 HC RX 258: Performed by: STUDENT IN AN ORGANIZED HEALTH CARE EDUCATION/TRAINING PROGRAM

## 2021-08-28 PROCEDURE — C9113 INJ PANTOPRAZOLE SODIUM, VIA: HCPCS | Performed by: STUDENT IN AN ORGANIZED HEALTH CARE EDUCATION/TRAINING PROGRAM

## 2021-08-28 PROCEDURE — 85610 PROTHROMBIN TIME: CPT

## 2021-08-28 PROCEDURE — 83735 ASSAY OF MAGNESIUM: CPT

## 2021-08-28 PROCEDURE — 99024 POSTOP FOLLOW-UP VISIT: CPT | Performed by: SURGERY

## 2021-08-28 PROCEDURE — 6360000002 HC RX W HCPCS: Performed by: NURSE PRACTITIONER

## 2021-08-28 RX ORDER — SCOLOPAMINE TRANSDERMAL SYSTEM 1 MG/1
1 PATCH, EXTENDED RELEASE TRANSDERMAL
Qty: 10 PATCH | Refills: 0 | Status: SHIPPED | OUTPATIENT
Start: 2021-08-30 | End: 2022-03-18

## 2021-08-28 RX ORDER — BISACODYL 10 MG
10 SUPPOSITORY, RECTAL RECTAL DAILY
Qty: 30 SUPPOSITORY | Refills: 0 | Status: SHIPPED | OUTPATIENT
Start: 2021-08-28 | End: 2021-09-13

## 2021-08-28 RX ORDER — ONDANSETRON 4 MG/1
4 TABLET, ORALLY DISINTEGRATING ORAL 3 TIMES DAILY PRN
Qty: 21 TABLET | Refills: 1 | Status: SHIPPED | OUTPATIENT
Start: 2021-08-28 | End: 2021-09-19 | Stop reason: SDUPTHER

## 2021-08-28 RX ADMIN — ONDANSETRON 4 MG: 2 INJECTION INTRAMUSCULAR; INTRAVENOUS at 10:39

## 2021-08-28 RX ADMIN — ENOXAPARIN SODIUM 100 MG: 100 INJECTION SUBCUTANEOUS at 10:27

## 2021-08-28 RX ADMIN — PANTOPRAZOLE SODIUM 40 MG: 40 INJECTION, POWDER, FOR SOLUTION INTRAVENOUS at 10:28

## 2021-08-28 RX ADMIN — ONDANSETRON 4 MG: 2 INJECTION INTRAMUSCULAR; INTRAVENOUS at 01:50

## 2021-08-28 RX ADMIN — SODIUM CHLORIDE, PRESERVATIVE FREE 10 ML: 5 INJECTION INTRAVENOUS at 10:29

## 2021-08-28 RX ADMIN — POTASSIUM CHLORIDE 20 MEQ: 29.8 INJECTION INTRAVENOUS at 00:38

## 2021-08-28 ASSESSMENT — ENCOUNTER SYMPTOMS
RESPIRATORY NEGATIVE: 1
EYES NEGATIVE: 1
NAUSEA: 1
ALLERGIC/IMMUNOLOGIC NEGATIVE: 1
VOMITING: 1

## 2021-08-28 ASSESSMENT — PAIN SCALES - GENERAL: PAINLEVEL_OUTOF10: 4

## 2021-08-28 NOTE — PROGRESS NOTES
Doing better and anticipate discharge  Abdomen soft, non-tender, non-distended  lipse mildly elevated    Impression:    1) S/P gastric sleeve    2) prob partial portal vein thrombosis- can be secondary to the gastric sleeve procedure   3) constipation, nausea, vomiting- likely due to the surgery or the portal vein thrombosis   4) mildly elevated lipase- no pancreatitis on CT- ? due to portal vein thrombosis      5) possible H pylori on gastric pathology     Plan:   1) agree with biscodyl suppos if not able to tolerate oral Miralax   2) needs colonoscopy later after recovery from the gastric sleeve   3) recommend treating H pylori in a few weeks when oral intake better

## 2021-08-28 NOTE — PROGRESS NOTES
AVS reviewed with pt. Pt verbalized understanding and signed AVS. PICC remains in place for home use. Periphreal IV removed. Belongings gathered and to exit by wc for personal vehicle for transportation home.

## 2021-08-28 NOTE — CARE COORDINATION
LSW received a call from [facility] with [address] informed this LSW that she has sent the Rx for the TPN to [facility] yesterday. [facility] stated that if pt is discharge today. They will need the discharge order and [facility] [place], [facility]. order before 2:00 PM in order for the TPN to be able to be delivered to pt. LSW PS Dr. Dania Collins and informed him of this information.

## 2021-08-28 NOTE — DISCHARGE SUMMARY
8/26 showed normal postsurgical changes. General surgery felt that patient should have TPN until her nausea and vomiting resolved. PICC line was placed on 8/27. TPN was ordered for home with home health care. GI recommended outpatient follow-up for colonoscopy and possible EGD. GI also suspected that patient had possible H pylori on gastric pathology. GI recommended treating H. pylori in a few weeks when oral intake is better. CT of the abdomen pelvis on 8/26 showed suspicion for left portal vein thrombosis. This appeared to be new compared to prior CT scan completed on 8/17. Heme-onc recommended anticoagulation for least 6 months. Patient was placed on Lovenox subcutaneously 1 mg/kg twice daily. Once patient is able to tolerate p.o. medication she should be switched to an oral anticoagulation medication such as Xarelto. Hypercoagulability panel has been initiated by heme-onc and was pending at the time of discharge. Patient will need outpatient follow-up with heme-onc. Patient had hypokalemia and hypomagnesemia which were replaced. Patient had constipation and eventually had several bowel movements with Fleet enema. Patient was stable. She was discharged to home with home health care. Physical Exam:  /70   Pulse 84   Temp 98.3 °F (36.8 °C) (Oral)   Resp 16   Ht 5' 1\" (1.549 m)   Wt 210 lb (95.3 kg)   SpO2 98%   BMI 39.68 kg/m²   General: no acute distress, well nourished  HEENT: normocephalic and atraumatic  Heart: S1S2 RRR  Lungs: Clear to ascultation bilaterally, respiratory effort normal  Abdomen: soft, mild right sided tenderness, no guarding or rigidity, has surgical wounds  Extremities: no pitting edema, nontender   Neuro: no gross deficits, patient is awake, alert and orientated  Skin: surgical wounds on abdomen    Patient Instructions:   Follow-up With  Details  Why  Contact Info   [address]       [address] [facility]   Jose Selby MD  Schedule an appointment as soon as possible for a visit in 1 week  for follow up of your hospital stay  [address]  [phone]  [address] [phone]  [address] [place] [address] Dr TERRIE MD  Schedule an appointment as soon as possible for a visit in 1 week  to check on your surgery  [address]  Alexei Cook [address]    Jhon Holt MD  Schedule an appointment as soon as possible for a visit in 2 weeks  for colonoscopy  [address] [facility],  [address]   Tran De Jesus MD  Schedule an appointment as soon as possible for a visit in 2 weeks  to check on your blood clot  [id number] [phone]         Medications: see computerized discharge medication list  Activity: as directed by surgeon  Diet: BARIATRIC DIET; Bariatric Clear Liquid  PN-Adult Premix 5/15 - Standardard Electrolytes - Central Line  Disposition: home with home care  Discharged Condition: Stable      The patient was seen and examined on day of discharge and this discharge summary is in conjunction with any daily progress note from day of discharge. Time spent on discharge is 33 minutes in the examination, evaluation, counseling and review of medications and discharge plan.     Discharge Medications:     Medication List      START taking these medications    bisacodyl 10 MG suppository  Commonly known as: DULCOLAX  Place 1 suppository rectally daily     enoxaparin 100 MG/ML injection  Commonly known as: LOVENOX  Inject 1 mL into the skin 2 times daily     scopolamine transdermal patch  Commonly known as: TRANSDERM-SCOP  Place 1 patch onto the skin every 72 hours  Start taking on: August 30, 2021        Quiana Matthews taking these medications    ondansetron 4 MG disintegrating tablet  Commonly known as: ZOFRAN-ODT  Take 1 tablet by mouth 3 times daily as needed for Nausea or Vomiting pantoprazole 20 MG tablet  Commonly known as: Protonix  Take 1 tablet by mouth 2 times daily           Where to Get Your Medications      These medications were sent to [address] [place], [place] - [address]  [address] [place] [address]    Phone: [phone]   · bisacodyl 10 MG suppository  · enoxaparin 100 MG/ML injection  · ondansetron 4 MG disintegrating tablet  · scopolamine transdermal patch         Consultations  IP CONSULT TO HOSPITALIST  IP CONSULT TO GI  IP CONSULT TO ONCOLOGY  IP CONSULT TO IV TEAM  IP CONSULT TO PHARMACY  IP CONSULT TO HOME CARE NEEDS    Invasive procedures:   none    Significant Diagnostic Studies:    Imaging  CT ABDOMEN PELVIS W IV CONTRAST Additional Contrast? Oral  Result Date: 8/25/2021  Findings suspicious for new left portal vein thrombus. FL UGI W AIR CONTRAST  Result Date: 8/26/2021  1. Limited upper GI series secondary to relative poor volume of ingested contrast, and patient inability to ingest additional contrast secondary to violent wretching and vomiting. 2. No gross evidence of an abnormal stricture, mass, or filling defect of the upper GI tract. There is no evidence of esophageal, gastric, or duodenal obstruction. 3. Expected postoperative changes status post gastric sleeve, without evidence of extravasation or leak. Findings were telephoned and discussed with the ordering clinician, Dr. Brandi Collins, at approximately 1512 hours on 08/26/2021. US ABDOMEN LIMITED Specify organ? LIVER  Result Date: 8/26/2021  The left portal vein thrombosis from the CT earlier today is not identified at ultrasound. The imaged portions of the main portal vein and left portal vein demonstrate patency and appropriate direction of flow. Findings could represent partial resolution of thrombus. Continued follow-up recommended.        Code Status:  Full Code        Junior Cordon MD

## 2021-08-28 NOTE — PROGRESS NOTES
General Surgery-Dr. Josh Cummins Day: 5    Chief Complaint on Admission: nausea, emesis, concern for portal venous thrombus      Subjective:     Continue to get TPN. Looks better today. Tolerating some sips. Still with some dry heaves. Feeling a little better. Still with some nausea. +BM  Was seen by GI and by Hematology   Denies F/C    ROS:  Review of Systems   Constitutional: Positive for appetite change and fatigue. HENT: Negative. Eyes: Negative. Respiratory: Negative. Cardiovascular: Negative. Gastrointestinal: Positive for nausea and vomiting (more dry heaving). Endocrine: Negative. Genitourinary: Negative. Musculoskeletal: Negative. Skin: Negative. Allergic/Immunologic: Negative. Neurological: Negative. Hematological: Negative. Psychiatric/Behavioral: Negative. Allergies  Levofloxacin          Diagnosis Date    COVID-19     \"had + covid test 2021- only symptom was headache\"- negative covid test 2021    H/O echocardiogram 2021    Mild LVH, AR, TR & MR.    History of kidney stones     last stone:     Wears dentures     full upper plate    Wears glasses     to read       Objective:     Vitals:    21 0159   BP: 127/70   Pulse: 84   Resp: 16   Temp: 98.3 °F (36.8 °C)   SpO2: 98%       TEMPERATURE:  Current -Temp: 98.3 °F (36.8 °C); Max - Temp  Av.9 °F (36.6 °C)  Min: 97.5 °F (36.4 °C)  Max: 98.3 °F (36.8 °C)    No intake/output data recorded. I/O last 3 completed shifts: In: 150 [I.V.:100; IV Piggyback:50]  Out: -       Physical Exam:  Physical Exam  Vitals reviewed. Constitutional:       General: She is not in acute distress. Appearance: She is obese. She is not ill-appearing, toxic-appearing or diaphoretic. Comments:  at bedside     HENT:      Head: Normocephalic and atraumatic.       Right Ear: External ear normal.      Left Ear: External ear normal.      Nose: Nose normal.   Eyes:      General: Right eye: No discharge. Left eye: No discharge. Extraocular Movements: Extraocular movements intact. Cardiovascular:      Rate and Rhythm: Normal rate. Pulmonary:      Effort: Pulmonary effort is normal.   Abdominal:      Palpations: Abdomen is soft. Tenderness: There is no abdominal tenderness. There is no guarding or rebound. Comments: Incisions healing appropriately    Musculoskeletal:         General: Normal range of motion. Skin:     General: Skin is warm. Neurological:      General: No focal deficit present. Mental Status: She is alert.    Psychiatric:         Mood and Affect: Mood normal.           Scheduled Meds:   scopolamine  1 patch Transdermal Q72H    sennosides-docusate sodium  2 tablet Oral Daily    polyethylene glycol  17 g Oral Daily    bisacodyl  10 mg Rectal Daily    fat emulsion  250 mL IntraVENous Once per day on Mon Tue Thu Fri    enoxaparin  1 mg/kg Subcutaneous BID    sodium chloride flush  5-40 mL IntraVENous 2 times per day    pantoprazole  40 mg IntraVENous Daily     ContinuousInfusions:   PN-Adult Premix 5/15 - Standard Electrolytes - Central Line 75 mL/hr at 08/27/21 2057    sodium chloride 75 mL/hr at 08/26/21 1216    sodium chloride       PRN Meds:potassium chloride, magnesium sulfate, potassium chloride, morphine, ondansetron, sodium chloride flush, sodium chloride, polyethylene glycol, acetaminophen **OR** acetaminophen, traMADol      Labs/Imaging Results:   Lab Results   Component Value Date    WBC 2.9 (L) 08/28/2021    HGB 12.5 08/28/2021    HCT 39.7 08/28/2021    MCV 93.2 08/28/2021     08/28/2021     Lab Results   Component Value Date     08/28/2021    K 3.5 08/28/2021     08/28/2021    CO2 28 08/28/2021    BUN 14 08/28/2021    CREATININE 0.5 (L) 08/28/2021    GLUCOSE 159 (H) 08/28/2021    CALCIUM 8.5 08/28/2021    PROT 6.3 (L) 08/25/2021    LABALBU 3.3 (L) 08/26/2021    BILITOT 1.0 08/25/2021    ALKPHOS 102 08/25/2021    AST 21 08/25/2021    ALT 32 08/25/2021    LABGLOM >60 08/28/2021    GFRAA >60 08/28/2021       Assessment:       50 y/o F with nausea and dry heaving, concern for new portal vein thrombus    Plan:       -Will need TPN at d/c for 3-5 weeks. Pt has PICC. -Appreciate Medicine, GI, and Heme recs. -Once d/c'd follow up with me in 1-2 weeks. -D/w pt and her  at bedside. Guillermo Marquez for d/c' from General Surgery standpoint once cleared by all other consultants.        Tierra Guzman MD

## 2021-08-28 NOTE — PROGRESS NOTES
Comprehensive Nutrition Assessment    Type and Reason for Visit:  Initial    Nutrition Recommendations/Plan:   · Continue current PN as ordered    Nutrition Assessment:  Pt is s/p a gastric sleeve ~3 weeks ago. She how has a portal vein thrombus and is need of TPN now and for 3-5 weeks surgery. Malnutrition Assessment:  Malnutrition Status:  Severe malnutrition    Context:  Chronic Illness     Findings of the 6 clinical characteristics of malnutrition:  Energy Intake:  7 - 75% or less estimated energy requirements for 1 month or longer  Weight Loss:  7 - 5% over 1 month     Body Fat Loss:  Unable to assess     Muscle Mass Loss:  Unable to assess    Fluid Accumulation:  1 - Mild Generalized   Strength:  Not Performed    Estimated Daily Nutrient Needs:  Energy (kcal):  4721-7443; Weight Used for Energy Requirements:  Current     Protein (g):  68;  Weight Used for Protein Requirements:  Ideal        Fluid (ml/day):  3181-4095; Method Used for Fluid Requirements:  1 ml/kcal     Wounds:  Surgical Incision       Current Nutrition Therapies:    Current Parenteral Nutrition Orders:  · Type and Formula: 2-in-1 Standard (5/15)   · Lipids: 250ml (x4)  · Duration: Continuous  · Rate/Volume: 75 mL/hr  · Current PN Order Provides: 1563 kcal and 90 g of protein day    Anthropometric Measures:  · Height: 5' 1\" (154.9 cm)  · Current Body Weight: 210 lb (95.3 kg)   · Admission Body Weight:  (CARLOS)    · Usual Body Weight: 253 lb (114.8 kg)     · Ideal Body Weight: 105 lbs; % Ideal Body Weight 200 %   · BMI: 39.7   · BMI Categories: Obese Class 2 (BMI 35.0 -39.9)       Nutrition Diagnosis:   · Severe malnutrition, In context of chronic, non-illness related related to altered GI structure as evidenced by weight loss greater than or equal to 5% in 1 month, poor intake prior to admission      Nutrition Interventions:   Food and/or Nutrient Delivery:  Continue Parenteral Nutrition  Nutrition Education/Counseling:  No recommendation at this time   Coordination of Nutrition Care:  Continue to monitor while inpatient    Goals:  pt will will receive greater than 75% of her estimated needs through PN until appropriate for diet       Nutrition Monitoring and Evaluation:   Food/Nutrient Intake Outcomes:  Parenteral Nutrition Intake/Tolerance, Diet Advancement/Tolerance  Physical Signs/Symptoms Outcomes:  Biochemical Data, Skin, Weight     Discharge Planning:     Too soon to determine     Electronically signed by Joselito Coleman RD, LD on 9/51/71 at 10:08 PM EDT    Contact: [phone]

## 2021-08-29 LAB
ANTICARDIOLIPIN IGA ANTIBODY: <10 APL (ref 0–11)
ANTICARDIOLIPIN IGG ANTIBODY: <10 GPL (ref 0–14)
BETA 2 GLYCOPROT.1 IGA AB: 4 SAU (ref 0–20)
BETA 2 GLYCOPROT.1 IGM AB: 3 SMU (ref 0–20)
BETA-2 GLYCOPROTEIN 1 IGG ANTIBODY: 1 SGU (ref 0–20)
CARDIOLIPIN AB IGM: <10 MPL (ref 0–12)

## 2021-08-29 NOTE — PROGRESS NOTES
Physician Progress Note      Akshat Keene  CSN #:                  [id number]  :                       1970  ADMIT DATE:       2021 10:37 PM  [address] [place] DATE:        2021 12:15 PM  RESPONDING  PROVIDER #:        Mak Adams II          QUERY TEXT:    Pt admitted with partial portal vein thrombosis. Pt noted to have Gastric   Sleeve 21. If possible, please document in progress notes and discharge   summary:    The medical record reflects the following:  Risk Factors: Gastric Sleeve on 21  Clinical Indicators: CT ABD/PELVIS: Findings suspicious for new left portal   vein thrombus.  Dr Britta Candelario, PN: \"prob partial portal vein thrombosis- can   be secondary to the gastric sleeve procedure\"  Treatment: anticoagulation    Thank you,  Dina Steele, RN  [phone]  Options provided:  -- Partial portal vein thrombosis as a postoperative complication  -- Partial portal vein thrombosis as an expected/inherent condition that   occurred postoperatively and not a complication  -- Partial portal vein thrombosis related to other incidental risk factor   (please specify) occurring following surgery and not a complication, Please   document incidental risk factor. -- Other - I will add my own diagnosis  -- Disagree - Not applicable / Not valid  -- Disagree - Clinically unable to determine / Unknown  -- Refer to Clinical Documentation Reviewer    PROVIDER RESPONSE TEXT:    Provider disagreed with this query. Pt was not admitted to me. Was admitted and d/c'd by the Hospitalist. Please   direct this message to them. Thanks.     Query created by: Lo Magana on 2021 11:31 AM      Electronically signed by:  Mak Adams II 2021 8:52 PM

## 2021-08-30 ENCOUNTER — HOSPITAL ENCOUNTER (OUTPATIENT)
Age: 51
Setting detail: SPECIMEN
Discharge: HOME | End: 2021-08-30
Payer: COMMERCIAL

## 2021-08-30 LAB
ALBUMIN SERPL-MCNC: 3.1 GM/DL (ref 3.4–5)
ALP BLD-CCNC: 95 IU/L (ref 40–128)
ALT SERPL-CCNC: 35 U/L (ref 10–40)
ANION GAP SERPL CALCULATED.3IONS-SCNC: 10 MMOL/L (ref 4–16)
AST SERPL-CCNC: 16 IU/L (ref 15–37)
BASOPHILS ABSOLUTE: 0 K/CU MM
BASOPHILS RELATIVE PERCENT: 0.8 % (ref 0–1)
BILIRUB SERPL-MCNC: 0.6 MG/DL (ref 0–1)
BUN BLDV-MCNC: 16 MG/DL (ref 6–23)
CALCIUM SERPL-MCNC: 8.3 MG/DL (ref 8.3–10.6)
CHLORIDE BLD-SCNC: 96 MMOL/L (ref 99–110)
CO2: 29 MMOL/L (ref 21–32)
CREAT SERPL-MCNC: 0.4 MG/DL (ref 0.6–1.1)
DIFFERENTIAL TYPE: ABNORMAL
EOSINOPHILS ABSOLUTE: 0.1 K/CU MM
EOSINOPHILS RELATIVE PERCENT: 1.3 % (ref 0–3)
GFR AFRICAN AMERICAN: >60 ML/MIN/1.73M2
GFR NON-AFRICAN AMERICAN: >60 ML/MIN/1.73M2
GLUCOSE BLD-MCNC: 134 MG/DL (ref 70–99)
HCT VFR BLD CALC: 40.6 % (ref 37–47)
HEMOGLOBIN: 13.2 GM/DL (ref 12.5–16)
IMMATURE NEUTROPHIL %: 0.8 % (ref 0–0.43)
LYMPHOCYTES ABSOLUTE: 1 K/CU MM
LYMPHOCYTES RELATIVE PERCENT: 26.1 % (ref 24–44)
MAGNESIUM: 1.9 MG/DL (ref 1.8–2.4)
MCH RBC QN AUTO: 29.3 PG (ref 27–31)
MCHC RBC AUTO-ENTMCNC: 32.5 % (ref 32–36)
MCV RBC AUTO: 90.2 FL (ref 78–100)
MONOCYTES ABSOLUTE: 0.4 K/CU MM
MONOCYTES RELATIVE PERCENT: 11.4 % (ref 0–4)
NUCLEATED RBC %: 0 %
OSMOLALITY: 292 MOS/L
PDW BLD-RTO: 13.7 % (ref 11.7–14.9)
PHOSPHORUS: 3 MG/DL (ref 2.5–4.9)
PLATELET # BLD: 140 K/CU MM (ref 140–440)
PMV BLD AUTO: 12.7 FL (ref 7.5–11.1)
POTASSIUM SERPL-SCNC: 3.3 MMOL/L (ref 3.5–5.1)
PREALBUMIN: 12 MG/DL (ref 20–40)
RBC # BLD: 4.5 M/CU MM (ref 4.2–5.4)
SEGMENTED NEUTROPHILS ABSOLUTE COUNT: 2.3 K/CU MM
SEGMENTED NEUTROPHILS RELATIVE PERCENT: 59.6 % (ref 36–66)
SODIUM BLD-SCNC: 135 MMOL/L (ref 135–145)
TOTAL IMMATURE NEUTOROPHIL: 0.03 K/CU MM
TOTAL NUCLEATED RBC: 0 K/CU MM
TOTAL PROTEIN: 5.3 GM/DL (ref 6.4–8.2)
TRIGL SERPL-MCNC: 122 MG/DL
WBC # BLD: 3.9 K/CU MM (ref 4–10.5)

## 2021-08-30 PROCEDURE — 84100 ASSAY OF PHOSPHORUS: CPT

## 2021-08-30 PROCEDURE — 84478 ASSAY OF TRIGLYCERIDES: CPT

## 2021-08-30 PROCEDURE — 84134 ASSAY OF PREALBUMIN: CPT

## 2021-08-30 PROCEDURE — 80053 COMPREHEN METABOLIC PANEL: CPT

## 2021-08-30 PROCEDURE — 83930 ASSAY OF BLOOD OSMOLALITY: CPT

## 2021-08-30 PROCEDURE — 83735 ASSAY OF MAGNESIUM: CPT

## 2021-08-30 PROCEDURE — 85025 COMPLETE CBC W/AUTO DIFF WBC: CPT

## 2021-08-30 NOTE — PROGRESS NOTES
Physician Progress Note      Misael Gamble  CSN #:                  [id number]  :                       1970  ADMIT DATE:       2021 10:37 PM  Stevo Harrison DATE:        2021 12:15 PM  RESPONDING  PROVIDER #:        Coco Hoyos CNP          QUERY TEXT:    Pt admitted with partial portal vein thrombosis. Pt noted to have Gastric   Sleeve 21. If possible, please document in progress notes and discharge   summary:    The medical record reflects the following:  Risk Factors: Gastric Sleeve on 21  Clinical Indicators: CT ABD/PELVIS: Findings suspicious for new left portal   vein thrombus.  Dr Rikki Mack, PN: \"prob partial portal vein thrombosis- can   be secondary to the gastric sleeve procedure\"  Treatment: anticoagulation    Thank you,  Dolly Renteria, RN  [phone]  Options provided:  -- Partial portal vein thrombosis as a postoperative complication  -- Partial portal vein thrombosis as an expected/inherent condition that   occurred postoperatively and not a complication  -- Partial portal vein thrombosis related to other incidental risk factor   (please specify) occurring following surgery and not a complication, Please   document incidental risk factor. -- Other - I will add my own diagnosis  -- Disagree - Not applicable / Not valid  -- Disagree - Clinically unable to determine / Unknown  -- Refer to Clinical Documentation Reviewer    PROVIDER RESPONSE TEXT:    Patient has a partial portal vein thrombosis as an expected condition that   occurred postoperatively and not a complication.     Query created by: Tracey Loco on 2021 7:25 AM      Electronically signed by:  Coco Hoyos CNP 2021 9:44 AM

## 2021-08-31 LAB
H PYLORI ANTIGEN STOOL: POSITIVE
PROTHROMBIN G20210A MUTATION: NEGATIVE

## 2021-09-01 LAB
ANTITHROMBIN ACTIVITY: 81 % (ref 76–128)
FACTOR V LEIDEN: NEGATIVE

## 2021-09-02 ENCOUNTER — HOSPITAL ENCOUNTER (OUTPATIENT)
Age: 51
Setting detail: SPECIMEN
Discharge: HOME | End: 2021-09-02
Payer: COMMERCIAL

## 2021-09-02 LAB
ALBUMIN SERPL-MCNC: 3.3 GM/DL (ref 3.4–5)
ALP BLD-CCNC: 98 IU/L (ref 40–128)
ALT SERPL-CCNC: 29 U/L (ref 10–40)
ANION GAP SERPL CALCULATED.3IONS-SCNC: 11 MMOL/L (ref 4–16)
AST SERPL-CCNC: 19 IU/L (ref 15–37)
BASOPHILS ABSOLUTE: 0 K/CU MM
BASOPHILS RELATIVE PERCENT: 0.7 % (ref 0–1)
BILIRUB SERPL-MCNC: 0.8 MG/DL (ref 0–1)
BUN BLDV-MCNC: 17 MG/DL (ref 6–23)
CALCIUM SERPL-MCNC: 8.6 MG/DL (ref 8.3–10.6)
CHLORIDE BLD-SCNC: 95 MMOL/L (ref 99–110)
CO2: 28 MMOL/L (ref 21–32)
CREAT SERPL-MCNC: 0.6 MG/DL (ref 0.6–1.1)
DIFFERENTIAL TYPE: ABNORMAL
EOSINOPHILS ABSOLUTE: 0.1 K/CU MM
EOSINOPHILS RELATIVE PERCENT: 1.3 % (ref 0–3)
GFR AFRICAN AMERICAN: >60 ML/MIN/1.73M2
GFR NON-AFRICAN AMERICAN: >60 ML/MIN/1.73M2
GLUCOSE BLD-MCNC: 87 MG/DL (ref 70–99)
HCT VFR BLD CALC: 40.6 % (ref 37–47)
HEMOGLOBIN: 13 GM/DL (ref 12.5–16)
IMMATURE NEUTROPHIL %: 0.7 % (ref 0–0.43)
LYMPHOCYTES ABSOLUTE: 1.1 K/CU MM
LYMPHOCYTES RELATIVE PERCENT: 25.2 % (ref 24–44)
MAGNESIUM: 2 MG/DL (ref 1.8–2.4)
MCH RBC QN AUTO: 29.1 PG (ref 27–31)
MCHC RBC AUTO-ENTMCNC: 32 % (ref 32–36)
MCV RBC AUTO: 90.8 FL (ref 78–100)
MONOCYTES ABSOLUTE: 0.7 K/CU MM
MONOCYTES RELATIVE PERCENT: 16.1 % (ref 0–4)
NUCLEATED RBC %: 0 %
OSMOLALITY: 282 MOS/L
PDW BLD-RTO: 14.3 % (ref 11.7–14.9)
PHOSPHORUS: 3.4 MG/DL (ref 2.5–4.9)
PLATELET # BLD: 127 K/CU MM (ref 140–440)
PMV BLD AUTO: 12.8 FL (ref 7.5–11.1)
POTASSIUM SERPL-SCNC: 3.9 MMOL/L (ref 3.5–5.1)
PREALBUMIN: 17 MG/DL (ref 20–40)
RBC # BLD: 4.47 M/CU MM (ref 4.2–5.4)
SEGMENTED NEUTROPHILS ABSOLUTE COUNT: 2.5 K/CU MM
SEGMENTED NEUTROPHILS RELATIVE PERCENT: 56 % (ref 36–66)
SODIUM BLD-SCNC: 134 MMOL/L (ref 135–145)
TOTAL IMMATURE NEUTOROPHIL: 0.03 K/CU MM
TOTAL NUCLEATED RBC: 0 K/CU MM
TOTAL PROTEIN: 5.8 GM/DL (ref 6.4–8.2)
TRIGL SERPL-MCNC: 112 MG/DL
WBC # BLD: 4.5 K/CU MM (ref 4–10.5)

## 2021-09-02 PROCEDURE — 84100 ASSAY OF PHOSPHORUS: CPT

## 2021-09-02 PROCEDURE — 84478 ASSAY OF TRIGLYCERIDES: CPT

## 2021-09-02 PROCEDURE — 83930 ASSAY OF BLOOD OSMOLALITY: CPT

## 2021-09-02 PROCEDURE — 80053 COMPREHEN METABOLIC PANEL: CPT

## 2021-09-02 PROCEDURE — 84134 ASSAY OF PREALBUMIN: CPT

## 2021-09-02 PROCEDURE — 85025 COMPLETE CBC W/AUTO DIFF WBC: CPT

## 2021-09-02 PROCEDURE — 83735 ASSAY OF MAGNESIUM: CPT

## 2021-09-06 ENCOUNTER — HOSPITAL ENCOUNTER (OUTPATIENT)
Age: 51
Setting detail: SPECIMEN
Discharge: HOME | End: 2021-09-06
Payer: COMMERCIAL

## 2021-09-06 PROCEDURE — 83930 ASSAY OF BLOOD OSMOLALITY: CPT

## 2021-09-06 PROCEDURE — 85025 COMPLETE CBC W/AUTO DIFF WBC: CPT

## 2021-09-06 PROCEDURE — 80053 COMPREHEN METABOLIC PANEL: CPT

## 2021-09-06 PROCEDURE — 84100 ASSAY OF PHOSPHORUS: CPT

## 2021-09-06 PROCEDURE — 83735 ASSAY OF MAGNESIUM: CPT

## 2021-09-06 PROCEDURE — 84478 ASSAY OF TRIGLYCERIDES: CPT

## 2021-09-06 PROCEDURE — 84134 ASSAY OF PREALBUMIN: CPT

## 2021-09-07 LAB
BASOPHILS ABSOLUTE: 0 K/CU MM
BASOPHILS RELATIVE PERCENT: 0.3 % (ref 0–1)
DIFFERENTIAL TYPE: ABNORMAL
EOSINOPHILS ABSOLUTE: 0.1 K/CU MM
EOSINOPHILS RELATIVE PERCENT: 2 % (ref 0–3)
HCT VFR BLD CALC: 38.5 % (ref 37–47)
HEMOGLOBIN: 12.3 GM/DL (ref 12.5–16)
IMMATURE NEUTROPHIL %: 0.6 % (ref 0–0.43)
LYMPHOCYTES ABSOLUTE: 0.9 K/CU MM
LYMPHOCYTES RELATIVE PERCENT: 25.9 % (ref 24–44)
MCH RBC QN AUTO: 29.4 PG (ref 27–31)
MCHC RBC AUTO-ENTMCNC: 31.9 % (ref 32–36)
MCV RBC AUTO: 92.1 FL (ref 78–100)
MONOCYTES ABSOLUTE: 0.5 K/CU MM
MONOCYTES RELATIVE PERCENT: 14.2 % (ref 0–4)
NUCLEATED RBC %: 0 %
PDW BLD-RTO: 14.7 % (ref 11.7–14.9)
PLATELET # BLD: 145 K/CU MM (ref 140–440)
PMV BLD AUTO: 11.4 FL (ref 7.5–11.1)
RBC # BLD: 4.18 M/CU MM (ref 4.2–5.4)
SEGMENTED NEUTROPHILS ABSOLUTE COUNT: 2 K/CU MM
SEGMENTED NEUTROPHILS RELATIVE PERCENT: 57 % (ref 36–66)
TOTAL IMMATURE NEUTOROPHIL: 0.02 K/CU MM
TOTAL NUCLEATED RBC: 0 K/CU MM
TOTAL RETICULOCYTE COUNT: 0.16 K/CU MM
WBC # BLD: 3.5 K/CU MM (ref 4–10.5)

## 2021-09-08 LAB
ALBUMIN SERPL-MCNC: 3.2 GM/DL (ref 3.4–5)
ALP BLD-CCNC: 91 IU/L (ref 40–128)
ALT SERPL-CCNC: 29 U/L (ref 10–40)
ANION GAP SERPL CALCULATED.3IONS-SCNC: 9 MMOL/L (ref 4–16)
AST SERPL-CCNC: 19 IU/L (ref 15–37)
BILIRUB SERPL-MCNC: 0.7 MG/DL (ref 0–1)
BUN BLDV-MCNC: 18 MG/DL (ref 6–23)
CALCIUM SERPL-MCNC: 8.7 MG/DL (ref 8.3–10.6)
CHLORIDE BLD-SCNC: 101 MMOL/L (ref 99–110)
CO2: 26 MMOL/L (ref 21–32)
CREAT SERPL-MCNC: 0.6 MG/DL (ref 0.6–1.1)
GFR AFRICAN AMERICAN: >60 ML/MIN/1.73M2
GFR NON-AFRICAN AMERICAN: >60 ML/MIN/1.73M2
GLUCOSE BLD-MCNC: 113 MG/DL (ref 70–99)
MAGNESIUM: 2.2 MG/DL (ref 1.8–2.4)
OSMOLALITY: 284 MOS/L
PHOSPHORUS: 3.4 MG/DL (ref 2.5–4.9)
POTASSIUM SERPL-SCNC: 4.6 MMOL/L (ref 3.5–5.1)
PREALBUMIN: 19 MG/DL (ref 20–40)
SODIUM BLD-SCNC: 136 MMOL/L (ref 135–145)
TOTAL PROTEIN: 5.5 GM/DL (ref 6.4–8.2)
TRIGL SERPL-MCNC: 87 MG/DL

## 2021-09-09 ENCOUNTER — HOSPITAL ENCOUNTER (OUTPATIENT)
Age: 51
Setting detail: SPECIMEN
Discharge: HOME | End: 2021-09-09
Payer: COMMERCIAL

## 2021-09-09 LAB
ALBUMIN SERPL-MCNC: 3.3 GM/DL (ref 3.4–5)
ALP BLD-CCNC: 96 IU/L (ref 40–128)
ALT SERPL-CCNC: 32 U/L (ref 10–40)
ANION GAP SERPL CALCULATED.3IONS-SCNC: 11 MMOL/L (ref 4–16)
AST SERPL-CCNC: 19 IU/L (ref 15–37)
BASOPHILS ABSOLUTE: 0 K/CU MM
BASOPHILS RELATIVE PERCENT: 0.6 % (ref 0–1)
BILIRUB SERPL-MCNC: 0.6 MG/DL (ref 0–1)
BUN BLDV-MCNC: 18 MG/DL (ref 6–23)
CALCIUM SERPL-MCNC: 8.6 MG/DL (ref 8.3–10.6)
CHLORIDE BLD-SCNC: 100 MMOL/L (ref 99–110)
CO2: 25 MMOL/L (ref 21–32)
CREAT SERPL-MCNC: 0.7 MG/DL (ref 0.6–1.1)
DIFFERENTIAL TYPE: ABNORMAL
EOSINOPHILS ABSOLUTE: 0.1 K/CU MM
EOSINOPHILS RELATIVE PERCENT: 2.8 % (ref 0–3)
GFR AFRICAN AMERICAN: >60 ML/MIN/1.73M2
GFR NON-AFRICAN AMERICAN: >60 ML/MIN/1.73M2
GLUCOSE BLD-MCNC: 96 MG/DL (ref 70–99)
HCT VFR BLD CALC: 39.2 % (ref 37–47)
HEMOGLOBIN: 12.3 GM/DL (ref 12.5–16)
IMMATURE NEUTROPHIL %: 0 % (ref 0–0.43)
LYMPHOCYTES ABSOLUTE: 0.9 K/CU MM
LYMPHOCYTES RELATIVE PERCENT: 27 % (ref 24–44)
MAGNESIUM: 2.2 MG/DL (ref 1.8–2.4)
MCH RBC QN AUTO: 29.4 PG (ref 27–31)
MCHC RBC AUTO-ENTMCNC: 31.4 % (ref 32–36)
MCV RBC AUTO: 93.8 FL (ref 78–100)
MONOCYTES ABSOLUTE: 0.4 K/CU MM
MONOCYTES RELATIVE PERCENT: 12.4 % (ref 0–4)
NUCLEATED RBC %: 0 %
OSMOLALITY: 290 MOS/L
PDW BLD-RTO: 14.8 % (ref 11.7–14.9)
PHOSPHORUS: 3.6 MG/DL (ref 2.5–4.9)
PLATELET # BLD: 142 K/CU MM (ref 140–440)
PMV BLD AUTO: 11.4 FL (ref 7.5–11.1)
POTASSIUM SERPL-SCNC: 4.4 MMOL/L (ref 3.5–5.1)
RBC # BLD: 4.18 M/CU MM (ref 4.2–5.4)
SEGMENTED NEUTROPHILS ABSOLUTE COUNT: 1.8 K/CU MM
SEGMENTED NEUTROPHILS RELATIVE PERCENT: 57.2 % (ref 36–66)
SODIUM BLD-SCNC: 136 MMOL/L (ref 135–145)
TOTAL IMMATURE NEUTOROPHIL: 0 K/CU MM
TOTAL NUCLEATED RBC: 0 K/CU MM
TOTAL PROTEIN: 5.5 GM/DL (ref 6.4–8.2)
TRIGL SERPL-MCNC: 87 MG/DL
WBC # BLD: 3.2 K/CU MM (ref 4–10.5)

## 2021-09-09 PROCEDURE — 85025 COMPLETE CBC W/AUTO DIFF WBC: CPT

## 2021-09-09 PROCEDURE — 83930 ASSAY OF BLOOD OSMOLALITY: CPT

## 2021-09-09 PROCEDURE — 83735 ASSAY OF MAGNESIUM: CPT

## 2021-09-09 PROCEDURE — 84100 ASSAY OF PHOSPHORUS: CPT

## 2021-09-09 PROCEDURE — 84478 ASSAY OF TRIGLYCERIDES: CPT

## 2021-09-09 PROCEDURE — 80053 COMPREHEN METABOLIC PANEL: CPT

## 2021-09-09 NOTE — PROGRESS NOTES
Physician Progress Note      Misael Gamble  CSN #:                  [id number]  :                       1970  ADMIT DATE:       2021 10:37 PM  Stevo Harrison DATE:        2021 12:15 PM  RESPONDING  PROVIDER #:        Monica Brody MD          QUERY TEXT:    Patient admitted with portal vein thrombosis. If possible, please document in   progress notes and discharge summary if you are evaluating and /or treating   any of the following: The medical record reflects the following:  Risk Factors: Recent GI sleeve procedure, Acute illness impeding PO intake  Clinical Indicators:  Dietitian Assessment below:  Malnutrition Assessment:  Malnutrition Status:  Severe malnutrition  Context:  Chronic Illness  Findings of the 6 clinical characteristics of malnutrition:  Energy Intake:  7 - 75% or less estimated energy requirements for 1 month or   longer  Weight Loss:  7 - 5% over 1 month  Body Fat Loss:  Unable to assess  Muscle Mass Loss:  Unable to assess  Fluid Accumulation:  1 - Mild Generalized   Strength:  Not Performed  Requiring TPN  Treatment: Continue TPN    ASPEN Criteria:    https://aspenjournals. onlinelibrary. fuller. com/doi/full/10.1177/330672013456607  5    Thank you,  Cintia Noriega RN  Options provided:  -- Protein calorie malnutrition severe  -- Other - I will add my own diagnosis  -- Disagree - Not applicable / Not valid  -- Disagree - Clinically unable to determine / Unknown  -- Refer to Clinical Documentation Reviewer    PROVIDER RESPONSE TEXT:    This patient has severe protein calorie malnutrition.     Query created by: Popeye Hopson on 2021 1:58 PM      Electronically signed by:  Monica Brody MD 2021 8:05 AM

## 2021-09-13 ENCOUNTER — HOSPITAL ENCOUNTER (OUTPATIENT)
Dept: INFUSION THERAPY | Age: 51
Discharge: HOME | End: 2021-09-13
Payer: COMMERCIAL

## 2021-09-13 ENCOUNTER — OFFICE VISIT (OUTPATIENT)
Dept: ONCOLOGY | Age: 51
End: 2021-09-13
Payer: COMMERCIAL

## 2021-09-13 ENCOUNTER — HOSPITAL ENCOUNTER (OUTPATIENT)
Age: 51
Setting detail: SPECIMEN
Discharge: HOME | End: 2021-09-13
Payer: COMMERCIAL

## 2021-09-13 VITALS
WEIGHT: 228 LBS | TEMPERATURE: 96.8 F | BODY MASS INDEX: 43.05 KG/M2 | OXYGEN SATURATION: 98 % | DIASTOLIC BLOOD PRESSURE: 59 MMHG | HEIGHT: 61 IN | HEART RATE: 96 BPM | RESPIRATION RATE: 18 BRPM | SYSTOLIC BLOOD PRESSURE: 123 MMHG

## 2021-09-13 DIAGNOSIS — I81 PORTAL VEIN THROMBOSIS: Primary | ICD-10-CM

## 2021-09-13 LAB
ALBUMIN SERPL-MCNC: 3.5 GM/DL (ref 3.4–5)
ALP BLD-CCNC: 93 IU/L (ref 40–128)
ALT SERPL-CCNC: 27 U/L (ref 10–40)
ANION GAP SERPL CALCULATED.3IONS-SCNC: 10 MMOL/L (ref 4–16)
AST SERPL-CCNC: 17 IU/L (ref 15–37)
BASOPHILS ABSOLUTE: 0 K/CU MM
BASOPHILS RELATIVE PERCENT: 0.5 % (ref 0–1)
BILIRUB SERPL-MCNC: 0.8 MG/DL (ref 0–1)
BUN BLDV-MCNC: 22 MG/DL (ref 6–23)
CALCIUM SERPL-MCNC: 8.8 MG/DL (ref 8.3–10.6)
CHLORIDE BLD-SCNC: 100 MMOL/L (ref 99–110)
CO2: 24 MMOL/L (ref 21–32)
CREAT SERPL-MCNC: 0.7 MG/DL (ref 0.6–1.1)
DIFFERENTIAL TYPE: ABNORMAL
EOSINOPHILS ABSOLUTE: 0.1 K/CU MM
EOSINOPHILS RELATIVE PERCENT: 1.5 % (ref 0–3)
GFR AFRICAN AMERICAN: >60 ML/MIN/1.73M2
GFR NON-AFRICAN AMERICAN: >60 ML/MIN/1.73M2
GLUCOSE BLD-MCNC: 101 MG/DL (ref 70–99)
HCT VFR BLD CALC: 38.8 % (ref 37–47)
HEMOGLOBIN: 12.3 GM/DL (ref 12.5–16)
IMMATURE NEUTROPHIL %: 0.2 % (ref 0–0.43)
LYMPHOCYTES ABSOLUTE: 0.9 K/CU MM
LYMPHOCYTES RELATIVE PERCENT: 22.9 % (ref 24–44)
MAGNESIUM: 2.2 MG/DL (ref 1.8–2.4)
MCH RBC QN AUTO: 29.6 PG (ref 27–31)
MCHC RBC AUTO-ENTMCNC: 31.7 % (ref 32–36)
MCV RBC AUTO: 93.3 FL (ref 78–100)
MONOCYTES ABSOLUTE: 0.4 K/CU MM
MONOCYTES RELATIVE PERCENT: 10.5 % (ref 0–4)
NUCLEATED RBC %: 0 %
OSMOLALITY: 285 MOS/L
PDW BLD-RTO: 14.7 % (ref 11.7–14.9)
PHOSPHORUS: 3.8 MG/DL (ref 2.5–4.9)
PLATELET # BLD: 148 K/CU MM (ref 140–440)
PMV BLD AUTO: 11.1 FL (ref 7.5–11.1)
POTASSIUM SERPL-SCNC: 4.3 MMOL/L (ref 3.5–5.1)
PREALBUMIN: 21 MG/DL (ref 20–40)
RBC # BLD: 4.16 M/CU MM (ref 4.2–5.4)
SEGMENTED NEUTROPHILS ABSOLUTE COUNT: 2.6 K/CU MM
SEGMENTED NEUTROPHILS RELATIVE PERCENT: 64.4 % (ref 36–66)
SODIUM BLD-SCNC: 134 MMOL/L (ref 135–145)
TOTAL IMMATURE NEUTOROPHIL: 0.01 K/CU MM
TOTAL NUCLEATED RBC: 0 K/CU MM
TOTAL PROTEIN: 5.8 GM/DL (ref 6.4–8.2)
TRIGL SERPL-MCNC: 88 MG/DL
WBC # BLD: 4 K/CU MM (ref 4–10.5)

## 2021-09-13 PROCEDURE — 80053 COMPREHEN METABOLIC PANEL: CPT

## 2021-09-13 PROCEDURE — 83735 ASSAY OF MAGNESIUM: CPT

## 2021-09-13 PROCEDURE — 99202 OFFICE O/P NEW SF 15 MIN: CPT

## 2021-09-13 PROCEDURE — 83930 ASSAY OF BLOOD OSMOLALITY: CPT

## 2021-09-13 PROCEDURE — 99214 OFFICE O/P EST MOD 30 MIN: CPT | Performed by: INTERNAL MEDICINE

## 2021-09-13 PROCEDURE — 1036F TOBACCO NON-USER: CPT | Performed by: INTERNAL MEDICINE

## 2021-09-13 PROCEDURE — 85025 COMPLETE CBC W/AUTO DIFF WBC: CPT

## 2021-09-13 PROCEDURE — G8428 CUR MEDS NOT DOCUMENT: HCPCS | Performed by: INTERNAL MEDICINE

## 2021-09-13 PROCEDURE — 1111F DSCHRG MED/CURRENT MED MERGE: CPT | Performed by: INTERNAL MEDICINE

## 2021-09-13 PROCEDURE — 84478 ASSAY OF TRIGLYCERIDES: CPT

## 2021-09-13 PROCEDURE — 3017F COLORECTAL CA SCREEN DOC REV: CPT | Performed by: INTERNAL MEDICINE

## 2021-09-13 PROCEDURE — G8417 CALC BMI ABV UP PARAM F/U: HCPCS | Performed by: INTERNAL MEDICINE

## 2021-09-13 PROCEDURE — 84134 ASSAY OF PREALBUMIN: CPT

## 2021-09-13 PROCEDURE — 84100 ASSAY OF PHOSPHORUS: CPT

## 2021-09-13 NOTE — PROGRESS NOTES
Patient Name:  Giulia Wiley  Patient :  1970  Patient MRN:  [id number]     Primary Oncologist: Wilma Tilley MD  Referring Provider: Anita Ryan MD     Date of Service: 2021      Reason for Consult: To follow her with her portal vein thrombosis. Chief Complaint:    Chief Complaint   Patient presents with    New Patient     Patient Active Problem List:     Lipoma of right forearm     Morbid obesity ([address]     Status post bariatric surgery     Status post laparoscopic sleeve gastrectomy     Lower abdominal pain     Intractable nausea and vomiting     Intractable vomiting     Portal vein thrombosis     Constipation     Severe malnutrition (HCC)    HPI:   Giulia Wiley is a 49 y.o. female who presents with persistent nausea and vomiting. She had laparoscopic gastric sleeve 21. She was then seen in the ED 21 for abdominal pain. She reports over past three days she has had worsening nausea with frequent vomiting associated with abdominal pain.      21 CT abdomen/pelvis with findings suspicious for new left portal vein thrombus.      Follow up  US  Impression  The left portal vein thrombosis from the CT earlier today is not identified  at ultrasound.  The imaged portions of the main portal vein and left portal  vein demonstrate patency and appropriate direction of flow.  Findings could  represent partial resolution of thrombus.  Continued follow-up recommended.     She denies previous blood clot. No family history of blood clots. We recommend to pursue hypercoagulable studies at this time. While she has risk factors, including recent surgery, obesity and immobility, they may not be enough to explain portal vein thrombosis. We would like to rule out acquired or hereditary thrombophilia. We will plan for at least six months of anticoagulation if hypercoagulable work ups are negative. If she is found to have hypercoagulable state, we will recommend long term AC tehrapy. Hypercoagulable work ups done on August 26, 2021 were within normal range. She was released from the hospital with Lovenox and with appointment to see me as an outpatient. September 14, 2021, she presented to me for follow-up. She started tolerating food lately and she stated that she thought she is ready to change [facility] therapy to oral form. I will start xarelto today and I gave her a started package. I will reevaluate her again in 4 to 6 weeks and I will consider to have repeat CT scans at that time. Since her hypercoagulable work-ups were normal, I recommend for total of 6 months of anticoagulation therapy.     She stated that her nausea and vomitings are getting much better and she only has vomiting maybe once a day lately. Besides that she does not have any other significant symptoms at today visit. Past Medical History:     Significant for  1. History of kidney stones    Past Surgery History:    Significant for  1. Appendicectomy in 2015  2. Cholecystectomy in 1987  3. Cystoscopy and stone manipulation in 2007  4. Dilatation and curettage of the uterus in 1986  5. Hysterectomy 2007  6. Sleeve gastrectomy in August 4, 2021    Social History:   She denies smoking or illicit drug abuse. She socially drinks alcohol. Family History:    Significant for bronchial asthma and congestive heart failure in her mother and stroke in her father.     Allergies   Allergen Reactions    Levofloxacin Shortness Of Breath       Current Outpatient Medications on File Prior to Visit   Medication Sig Dispense Refill    scopolamine (TRANSDERM-SCOP) transdermal patch Place 1 patch onto the skin every 72 hours 10 patch 0    enoxaparin (LOVENOX) 100 MG/ML injection Inject 1 mL into the skin 2 times daily 60 mL 0    ondansetron (ZOFRAN-ODT) 4 MG disintegrating tablet Take 1 tablet by mouth 3 times daily as needed for Nausea or Vomiting 21 tablet 1    pantoprazole (PROTONIX) 20 MG tablet Take 1 tablet by 09/09/2021    CO2 25 09/09/2021    BUN 18 09/09/2021    CREATININE 0.7 09/09/2021    GLUCOSE 96 09/09/2021    CALCIUM 8.6 09/09/2021    PROT 5.5 (L) 09/09/2021    LABALBU 3.3 (L) 09/09/2021    BILITOT 0.6 09/09/2021    ALKPHOS 96 09/09/2021    AST 19 09/09/2021    ALT 32 09/09/2021    LABGLOM >60 09/09/2021    GFRAA >60 09/09/2021    PHOS 3.6 09/09/2021    MG 2.2 09/09/2021    POCGLU 141 (H) 08/27/2021     Lab Results   Component Value Date    HOMOCYSTEINE 7.6 08/26/2021     No components found for: LD  No results found for: TSHHS, T4FREE, FT3  Immunology:  Lab Results   Component Value Date    PROT 5.5 (L) 09/09/2021     No results found for: aMx Payne, KLFLCR  No results found for: B2M  Coagulation Panel:  Lab Results   Component Value Date    PROTIME 13.3 08/28/2021    INR 1.03 08/28/2021    APTT 33.0 08/28/2021    DDIMER 311 (H) 12/25/2010     Anemia Panel:  Lab Results   Component Value Date    WFIKWWZH82 289.1 12/02/2020    FOLATE 7.9 12/02/2020     Tumor Markers:  No results found for: , CEA, , LABCA2, PSA     Observations:  No data recorded     Assessment   Portal vein thrombosis    Plan:                                                                                                    Hypercoagulable work ups done on August 26, 2021 were within normal range. She was released from the hospital with Lovenox and with appointment to see me as an outpatient. September 14, 2021, she presented to me for follow-up. She started tolerating food lately and she stated that she thought she is ready to change [facility] therapy to oral form. I will start xarelto today and I gave her a started package. I will reevaluate her again in 4 to 6 weeks and I will consider to have repeat CT scans at that time.     Since her hypercoagulable work-ups were normal, I recommend for total of 6 months of anticoagulation therapy.     She stated that her nausea and vomitings are getting much better and she only has vomiting maybe once a day lately. I answered all her questions and concerns for today. I asked her to follow up with primary care physician on regular basis. I will continue to keep you updated on her progress. Thank you for allowing me to participate in the care of this very pleasant patient. Recent imaging and labs were reviewed and discussed with the patient.

## 2021-09-13 NOTE — PROGRESS NOTES
MA Rooming Questions  Patient: Maralee Litten  MRN: [id number]    Date: 9/13/2021        1. Do you have any new issues? Yes-Pt states she has been out of Lovenox injections for a few days. 2. Do you need any refills on medications?     no    Narcisa Young, CMA

## 2021-09-16 ENCOUNTER — OFFICE VISIT (OUTPATIENT)
Dept: BARIATRICS/WEIGHT MGMT | Age: 51
End: 2021-09-16

## 2021-09-16 VITALS
BODY MASS INDEX: 41.96 KG/M2 | OXYGEN SATURATION: 100 % | HEART RATE: 76 BPM | WEIGHT: 228 LBS | DIASTOLIC BLOOD PRESSURE: 76 MMHG | SYSTOLIC BLOOD PRESSURE: 120 MMHG | HEIGHT: 62 IN

## 2021-09-16 DIAGNOSIS — Z98.84 STATUS POST LAPAROSCOPIC SLEEVE GASTRECTOMY: Primary | ICD-10-CM

## 2021-09-16 PROCEDURE — 99024 POSTOP FOLLOW-UP VISIT: CPT | Performed by: SURGERY

## 2021-09-16 ASSESSMENT — ENCOUNTER SYMPTOMS
ALLERGIC/IMMUNOLOGIC NEGATIVE: 1
RESPIRATORY NEGATIVE: 1
NAUSEA: 0
VOMITING: 0
EYES NEGATIVE: 1
ABDOMINAL PAIN: 0

## 2021-09-16 NOTE — PROGRESS NOTES
BARIATRIC SURGERY OFFICE PROGRESS NOTE    SUBJECTIVE:    Patient presenting today referred from Byron Stevens MD, for   Chief Complaint   Patient presents with   [facility] Weight Management     p/o 3rd s/g 8/4/21   . Vitals:    09/16/21 0958   BP: 120/76   Pulse: 76   SpO2: 100%        BMI: Body mass index is 41.7 kg/m². Weight History: Wt Readings from Last 3 Encounters:   09/16/21 228 lb (103.4 kg)   09/13/21 228 lb (103.4 kg)   08/24/21 210 lb (95.3 kg)        If within 30 days of bariatric surgery date, have you been to the ED: Jina Webster is a 49 y.o. female presenting in third bariatric POST-OP visit. Total weight loss/gain: +10 lbs since last visit, -14.7 lbs since surgery, -30.4 lbs since starting program    Thoroughly reviewed the patient's medical history, family history, social history and review of systems with the patient today in the office. Please see medical record for pertinent positives. Changes in health since last visit: Pt did have ER visit for nausea / emesis found to have portal vein DVT. Doing significantly better since last time being seen. Has been on 24 h TPN. Now tolerating PO (blended chili, yogurt) without N/V. Saw Heme and transitioned to PO anti coagulation. Now only gets nausea when upset or stressed. Pt tracking calories: Somewhat. Pt exercising: Walking.        Past Medical History:   Diagnosis Date    COVID-19     \"had + covid test 4/2021- only symptom was headache\"- negative covid test 5/21/2021    H/O echocardiogram 02/25/2021    Mild LVH, AR, TR & MR.    History of kidney stones     last stone: 2006    Wears dentures     full upper plate    Wears glasses     to read        Patient Active Problem List   Diagnosis    Lipoma of right forearm    Morbid obesity ([address]    Status post bariatric surgery    Status post laparoscopic sleeve gastrectomy    Lower abdominal pain    Intractable nausea and vomiting    Intractable vomiting    Portal vein thrombosis    Constipation    Severe malnutrition [address]       Past Surgical History:   Procedure Laterality Date    APP[place]CTOMY  2015? [place]    CYSTOSCOPY  2007?    stone manipulation   [address]    HYSTERECTOMY  2007    LITHOTRIPSY      ( not sure if had this done-per pt)    SLEEVE GASTRECTOMY N/A 8/4/2021    GASTRECTOMY SLEEVE LAPAROSCOPIC ROBOTIC  POSS HIATAL HERNIA REPAIR performed by Katherin Crenshaw MD at [facility] 5/28/2021    EGD BIOPSY performed by Katherin Crenshaw MD at [address] [place] [address] N/A 8/4/2021    EGD ESOPHAGOGASTRODUODENOSCOPY performed by Katherin Crenshaw MD at [facility] OR       Current Outpatient Medications   Medication Sig Dispense Refill    scopolamine (TRANSDERM-SCOP) transdermal patch Place 1 patch onto the skin every 72 hours 10 patch 0    enoxaparin (LOVENOX) 100 MG/ML injection Inject 1 mL into the skin 2 times daily (Patient not taking: Reported on 9/16/2021) 60 mL 0    ondansetron (ZOFRAN-ODT) 4 MG disintegrating tablet Take 1 tablet by mouth 3 times daily as needed for Nausea or Vomiting 21 tablet 1    pantoprazole (PROTONIX) 20 MG tablet Take 1 tablet by mouth 2 times daily 60 tablet 3     No current facility-administered medications for this visit. Allergies   Allergen Reactions    Levofloxacin Shortness Of Breath         Review of Systems   Constitutional: Positive for appetite change (improved from last visit). Negative for fatigue and fever. HENT: Negative. Eyes: Negative. Respiratory: Negative. Cardiovascular: Negative. Gastrointestinal: Negative for abdominal pain, nausea and vomiting. Endocrine: Negative. Genitourinary: Negative. Musculoskeletal: Negative. Skin: Negative. Allergic/Immunologic: Negative. Neurological: Negative. Hematological: Negative. Psychiatric/Behavioral: Negative. OBJECTIVE:    /76   Pulse 76   Ht 5' 2\" (1.575 m)   Wt 228 lb (103.4 kg)   SpO2 100%   BMI 41.70 kg/m²      Physical Exam  Vitals reviewed. Constitutional:       General: She is not in acute distress. Appearance: She is obese. She is not ill-appearing, toxic-appearing or diaphoretic. HENT:      Head: Normocephalic and atraumatic. Right Ear: External ear normal.      Left Ear: External ear normal.      Nose: Nose normal.   Eyes:      General:         Right eye: No discharge. Left eye: No discharge. Extraocular Movements: Extraocular movements intact. Cardiovascular:      Rate and Rhythm: Normal rate. Pulses: Normal pulses. Pulmonary:      Effort: Pulmonary effort is normal.   Abdominal:      Palpations: Abdomen is soft. Tenderness: There is no abdominal tenderness. There is no guarding or rebound. Comments: Incisions healing appropriately     Musculoskeletal:         General: Swelling (lower extremity b/l) present. Cervical back: Normal range of motion. Skin:     General: Skin is warm. Neurological:      General: No focal deficit present. Mental Status: She is alert. Psychiatric:         Mood and Affect: Mood normal.         ASSESSMENT & PLAN:    1. Status post laparoscopic sleeve gastrectomy  -Decrease TPN to only at night. Pt now tolerating PO. Will trial TPN just at night for one week and then wean completely off. Continue pureed and full diet.     -600 sarai / day. 60 g protein / day. -Increase activity.     -Weight gain today likely from TPN / fluid. Not concerned about this weight gain and will continue to follow. -F/u in two weeks or sooner if needed. Call with any questions, concerns, or issues whatsoever. No orders of the defined types were placed in this encounter. No orders of the defined types were placed in this encounter. Follow Up:  No follow-ups on file.     Gumaro Rahpael MD

## 2021-09-19 RX ORDER — ONDANSETRON 4 MG/1
4 TABLET, ORALLY DISINTEGRATING ORAL 3 TIMES DAILY PRN
Qty: 35 TABLET | Refills: 5 | Status: SHIPPED | OUTPATIENT
Start: 2021-09-19 | End: 2022-03-18

## 2021-09-20 ENCOUNTER — HOSPITAL ENCOUNTER (OUTPATIENT)
Age: 51
Setting detail: SPECIMEN
Discharge: HOME | End: 2021-09-20
Payer: COMMERCIAL

## 2021-09-20 LAB
ALBUMIN SERPL-MCNC: 3.5 GM/DL (ref 3.4–5)
ALP BLD-CCNC: 87 IU/L (ref 40–128)
ALT SERPL-CCNC: 34 U/L (ref 10–40)
ANION GAP SERPL CALCULATED.3IONS-SCNC: 13 MMOL/L (ref 4–16)
AST SERPL-CCNC: 20 IU/L (ref 15–37)
BASOPHILS ABSOLUTE: 0 K/CU MM
BASOPHILS RELATIVE PERCENT: 0.3 % (ref 0–1)
BILIRUB SERPL-MCNC: 1 MG/DL (ref 0–1)
BUN BLDV-MCNC: 18 MG/DL (ref 6–23)
CALCIUM SERPL-MCNC: 8.8 MG/DL (ref 8.3–10.6)
CHLORIDE BLD-SCNC: 103 MMOL/L (ref 99–110)
CO2: 22 MMOL/L (ref 21–32)
CREAT SERPL-MCNC: 0.7 MG/DL (ref 0.6–1.1)
DIFFERENTIAL TYPE: ABNORMAL
EOSINOPHILS ABSOLUTE: 0 K/CU MM
EOSINOPHILS RELATIVE PERCENT: 1.1 % (ref 0–3)
GFR AFRICAN AMERICAN: >60 ML/MIN/1.73M2
GFR NON-AFRICAN AMERICAN: >60 ML/MIN/1.73M2
GLUCOSE BLD-MCNC: 87 MG/DL (ref 70–99)
HCT VFR BLD CALC: 38.4 % (ref 37–47)
HEMOGLOBIN: 12.3 GM/DL (ref 12.5–16)
IMMATURE NEUTROPHIL %: 0.3 % (ref 0–0.43)
LYMPHOCYTES ABSOLUTE: 0.9 K/CU MM
LYMPHOCYTES RELATIVE PERCENT: 24.2 % (ref 24–44)
MAGNESIUM: 1.9 MG/DL (ref 1.8–2.4)
MCH RBC QN AUTO: 29.9 PG (ref 27–31)
MCHC RBC AUTO-ENTMCNC: 32 % (ref 32–36)
MCV RBC AUTO: 93.4 FL (ref 78–100)
MONOCYTES ABSOLUTE: 0.3 K/CU MM
MONOCYTES RELATIVE PERCENT: 9.1 % (ref 0–4)
NUCLEATED RBC %: 0 %
OSMOLALITY: 288 MOS/L
PDW BLD-RTO: 14 % (ref 11.7–14.9)
PHOSPHORUS: 2.9 MG/DL (ref 2.5–4.9)
PLATELET # BLD: 138 K/CU MM (ref 140–440)
PMV BLD AUTO: 11.1 FL (ref 7.5–11.1)
POTASSIUM SERPL-SCNC: 4 MMOL/L (ref 3.5–5.1)
PREALBUMIN: 17 MG/DL (ref 20–40)
RBC # BLD: 4.11 M/CU MM (ref 4.2–5.4)
SEGMENTED NEUTROPHILS ABSOLUTE COUNT: 2.3 K/CU MM
SEGMENTED NEUTROPHILS RELATIVE PERCENT: 65 % (ref 36–66)
SODIUM BLD-SCNC: 138 MMOL/L (ref 135–145)
TOTAL IMMATURE NEUTOROPHIL: 0.01 K/CU MM
TOTAL NUCLEATED RBC: 0 K/CU MM
TOTAL PROTEIN: 5.9 GM/DL (ref 6.4–8.2)
TRIGL SERPL-MCNC: 45 MG/DL
WBC # BLD: 3.5 K/CU MM (ref 4–10.5)

## 2021-09-20 PROCEDURE — 83735 ASSAY OF MAGNESIUM: CPT

## 2021-09-20 PROCEDURE — 85025 COMPLETE CBC W/AUTO DIFF WBC: CPT

## 2021-09-20 PROCEDURE — 84478 ASSAY OF TRIGLYCERIDES: CPT

## 2021-09-20 PROCEDURE — 84100 ASSAY OF PHOSPHORUS: CPT

## 2021-09-20 PROCEDURE — 84134 ASSAY OF PREALBUMIN: CPT

## 2021-09-20 PROCEDURE — 80053 COMPREHEN METABOLIC PANEL: CPT

## 2021-09-20 PROCEDURE — 83930 ASSAY OF BLOOD OSMOLALITY: CPT

## 2021-09-27 ENCOUNTER — HOSPITAL ENCOUNTER (OUTPATIENT)
Age: 51
Setting detail: SPECIMEN
Discharge: HOME | End: 2021-09-27
Payer: COMMERCIAL

## 2021-09-27 ENCOUNTER — TELEPHONE (OUTPATIENT)
Dept: BARIATRICS/WEIGHT MGMT | Age: 51
End: 2021-09-27

## 2021-09-27 LAB
ALBUMIN SERPL-MCNC: 3.8 GM/DL (ref 3.4–5)
ALP BLD-CCNC: 86 IU/L (ref 40–129)
ALT SERPL-CCNC: 37 U/L (ref 10–40)
ANION GAP SERPL CALCULATED.3IONS-SCNC: 14 MMOL/L (ref 4–16)
AST SERPL-CCNC: 21 IU/L (ref 15–37)
BASOPHILS ABSOLUTE: 0 K/CU MM
BASOPHILS RELATIVE PERCENT: 0.5 % (ref 0–1)
BILIRUB SERPL-MCNC: 1.2 MG/DL (ref 0–1)
BUN BLDV-MCNC: 16 MG/DL (ref 6–23)
CALCIUM SERPL-MCNC: 9 MG/DL (ref 8.3–10.6)
CHLORIDE BLD-SCNC: 102 MMOL/L (ref 99–110)
CO2: 23 MMOL/L (ref 21–32)
CREAT SERPL-MCNC: 0.5 MG/DL (ref 0.6–1.1)
DIFFERENTIAL TYPE: ABNORMAL
EOSINOPHILS ABSOLUTE: 0.1 K/CU MM
EOSINOPHILS RELATIVE PERCENT: 1.3 % (ref 0–3)
GFR AFRICAN AMERICAN: >60 ML/MIN/1.73M2
GFR NON-AFRICAN AMERICAN: >60 ML/MIN/1.73M2
GLUCOSE BLD-MCNC: 85 MG/DL (ref 70–99)
HCT VFR BLD CALC: 41.7 % (ref 37–47)
HEMOGLOBIN: 13.4 GM/DL (ref 12.5–16)
IMMATURE NEUTROPHIL %: 0 % (ref 0–0.43)
LYMPHOCYTES ABSOLUTE: 1.1 K/CU MM
LYMPHOCYTES RELATIVE PERCENT: 27.5 % (ref 24–44)
MAGNESIUM: 1.7 MG/DL (ref 1.8–2.4)
MCH RBC QN AUTO: 29.6 PG (ref 27–31)
MCHC RBC AUTO-ENTMCNC: 32.1 % (ref 32–36)
MCV RBC AUTO: 92.3 FL (ref 78–100)
MONOCYTES ABSOLUTE: 0.4 K/CU MM
MONOCYTES RELATIVE PERCENT: 8.8 % (ref 0–4)
NUCLEATED RBC %: 0 %
PDW BLD-RTO: 13.6 % (ref 11.7–14.9)
PHOSPHORUS: 2.8 MG/DL (ref 2.5–4.9)
PLATELET # BLD: 152 K/CU MM (ref 140–440)
PMV BLD AUTO: 11.5 FL (ref 7.5–11.1)
POTASSIUM SERPL-SCNC: 4.1 MMOL/L (ref 3.5–5.1)
PREALBUMIN: 15 MG/DL (ref 20–40)
RBC # BLD: 4.52 M/CU MM (ref 4.2–5.4)
SEGMENTED NEUTROPHILS ABSOLUTE COUNT: 2.5 K/CU MM
SEGMENTED NEUTROPHILS RELATIVE PERCENT: 61.9 % (ref 36–66)
SODIUM BLD-SCNC: 139 MMOL/L (ref 135–145)
TOTAL IMMATURE NEUTOROPHIL: 0 K/CU MM
TOTAL NUCLEATED RBC: 0 K/CU MM
TOTAL PROTEIN: 6.2 GM/DL (ref 6.4–8.2)
TRIGL SERPL-MCNC: 46 MG/DL
WBC # BLD: 4 K/CU MM (ref 4–10.5)

## 2021-09-27 PROCEDURE — 83930 ASSAY OF BLOOD OSMOLALITY: CPT

## 2021-09-27 PROCEDURE — 80053 COMPREHEN METABOLIC PANEL: CPT

## 2021-09-27 PROCEDURE — 84134 ASSAY OF PREALBUMIN: CPT

## 2021-09-27 PROCEDURE — 84478 ASSAY OF TRIGLYCERIDES: CPT

## 2021-09-27 PROCEDURE — 84100 ASSAY OF PHOSPHORUS: CPT

## 2021-09-27 PROCEDURE — 83735 ASSAY OF MAGNESIUM: CPT

## 2021-09-27 PROCEDURE — 85025 COMPLETE CBC W/AUTO DIFF WBC: CPT

## 2021-09-28 LAB — OSMOLALITY: 294 MOS/L

## 2021-09-29 ENCOUNTER — TELEPHONE (OUTPATIENT)
Dept: BARIATRICS/WEIGHT MGMT | Age: 51
End: 2021-09-29

## 2021-09-29 NOTE — TELEPHONE ENCOUNTER
Per conversation with Dr. Giovanny Flower is ok to D/C PICC since P/O intake is now tolerated. Called [facility] to give permission to remove PICC line.

## 2021-09-29 NOTE — TELEPHONE ENCOUNTER
[address] nurse called stating pt would like to pull PICC line.  Please call Priya Swanson [phone] to let her know if PICC can be pulled

## 2021-10-01 ENCOUNTER — OFFICE VISIT (OUTPATIENT)
Dept: BARIATRICS/WEIGHT MGMT | Age: 51
End: 2021-10-01

## 2021-10-01 VITALS
DIASTOLIC BLOOD PRESSURE: 76 MMHG | HEIGHT: 62 IN | WEIGHT: 211.06 LBS | BODY MASS INDEX: 38.84 KG/M2 | OXYGEN SATURATION: 91 % | SYSTOLIC BLOOD PRESSURE: 124 MMHG | HEART RATE: 104 BPM

## 2021-10-01 DIAGNOSIS — Z90.3 HISTORY OF SLEEVE GASTRECTOMY: Primary | ICD-10-CM

## 2021-10-01 PROCEDURE — 99024 POSTOP FOLLOW-UP VISIT: CPT | Performed by: SURGERY

## 2021-10-01 ASSESSMENT — ENCOUNTER SYMPTOMS
EYES NEGATIVE: 1
NAUSEA: 0
RESPIRATORY NEGATIVE: 1
VOMITING: 0
ALLERGIC/IMMUNOLOGIC NEGATIVE: 1
ABDOMINAL PAIN: 0

## 2021-10-01 NOTE — PROGRESS NOTES
BARIATRIC SURGERY OFFICE PROGRESS NOTE    SUBJECTIVE:    Patient presenting today referred from Mariana Mendiola MD, for   Chief Complaint   Patient presents with    Follow-up     POST OP #4th po sleeve 08/4/21   . Vitals:    10/01/21 1243   BP: 124/76   Pulse: 104   SpO2: 91%        BMI: Body mass index is 38.6 kg/m². Weight History: Wt Readings from Last 3 Encounters:   10/01/21 211 lb 1 oz (95.7 kg)   09/16/21 228 lb (103.4 kg)   09/13/21 228 lb (103.4 kg)        If within 30 days of bariatric surgery date, have you been to the ED: Ana Mtz is a 49 y.o. female presenting in fourth bariatric POST-OP visit. Total weight loss/gain: -17.7 lbs since last visit, -32.4 lbs since surgery, -48.1 lbs since starting program    Thoroughly reviewed the patient's medical history, family history, social history and review of systems with the patient today in the office. Please see medical record for pertinent positives. Changes in health since last visit: Overall feels \"100 percent\" better than when leaving hospital. Not back to pre surgery energy levels but getting there. Tolerating PO. No N/V except when takes in foods shouldn't. Stopped TPN. Had PICC d/c'd. Pt tracking calories: Yes. Pt exercising: Yes, walking.        Past Medical History:   Diagnosis Date    COVID-19     \"had + covid test 4/2021- only symptom was headache\"- negative covid test 5/21/2021    H/O echocardiogram 02/25/2021    Mild LVH, AR, TR & MR.    History of kidney stones     last stone: 2006    Wears dentures     full upper plate    Wears glasses     to read        Patient Active Problem List   Diagnosis    Lipoma of right forearm    Morbid obesity ([address]    Status post bariatric surgery    Status post laparoscopic sleeve gastrectomy    Lower abdominal pain    Intractable nausea and vomiting    Intractable vomiting    Portal vein thrombosis    Constipation    Severe malnutrition (HCC)       Past Surgical History:   Procedure Laterality Date    [facility]    CYSTOSCOPY  2007?    stone manipulation   [address]    HYSTERECTOMY  2007    LITHOTRIPSY      ( not sure if had this done-per pt)    SLEEVE GASTRECTOMY N/A 8/4/2021    GASTRECTOMY SLEEVE LAPAROSCOPIC ROBOTIC  POSS HIATAL HERNIA REPAIR performed by Rigoberto Chavez MD at [address] [place] [address] 5/28/2021    EGD BIOPSY performed by Rigoberto Chavez MD at [facility] N/A 8/4/2021    EGD ESOPHAGOGASTRODUODENOSCOPY performed by Rigoberto Chavez MD at [facility] OR       Current Outpatient Medications   Medication Sig Dispense Refill    ondansetron (ZOFRAN-ODT) 4 MG disintegrating tablet Take 1 tablet by mouth 3 times daily as needed for Nausea or Vomiting 35 tablet 5    scopolamine (TRANSDERM-SCOP) transdermal patch Place 1 patch onto the skin every 72 hours 10 patch 0    pantoprazole (PROTONIX) 20 MG tablet Take 1 tablet by mouth 2 times daily (Patient not taking: Reported on 10/1/2021) 60 tablet 3     No current facility-administered medications for this visit. Allergies   Allergen Reactions    Levofloxacin Shortness Of Breath         Review of Systems   Constitutional: Positive for fatigue (improving). HENT: Negative. Eyes: Negative. Respiratory: Negative. Cardiovascular: Negative. Gastrointestinal: Negative for abdominal pain, nausea and vomiting. Endocrine: Negative. Genitourinary: Negative. Musculoskeletal: Negative. Skin: Negative. Allergic/Immunologic: Negative. Neurological: Negative. Hematological: Negative. Psychiatric/Behavioral: Negative. All other systems reviewed and are negative. OBJECTIVE:    /76   Pulse 104   Ht 5' 2\" (1.575 m)   Wt 211 lb 1 oz (95.7 kg)   SpO2 91%   BMI 38.60 kg/m²      Physical Exam  Vitals reviewed.    Constitutional: General: She is not in acute distress. Appearance: She is obese. She is not ill-appearing, toxic-appearing or diaphoretic. HENT:      Head: Normocephalic and atraumatic. Right Ear: External ear normal.      Left Ear: External ear normal.      Nose: Nose normal.   Eyes:      General:         Right eye: No discharge. Left eye: No discharge. Extraocular Movements: Extraocular movements intact. Cardiovascular:      Rate and Rhythm: Normal rate. Pulmonary:      Effort: Pulmonary effort is normal.   Abdominal:      Palpations: Abdomen is soft. Tenderness: There is no abdominal tenderness. Musculoskeletal:         General: Normal range of motion. Cervical back: Normal range of motion. Skin:     General: Skin is warm. Neurological:      General: No focal deficit present. Mental Status: She is alert. Psychiatric:         Mood and Affect: Mood normal.         ASSESSMENT & PLAN:    1. History of sleeve gastrectomy  -Doing well. Down 32.4 lbs since surgery, 48.1 lbs since starting program.   -No activity restrictions. Regular foods. 600 sarai / day. 60 g protein /day. -RTW note given Monday, Oct 11.  -F/u per protocol.  -Call with any questions, concerns, or issues whatsoever. As of current visit, regarding obesity-related co-morbid conditions:  DELIA [] compliant [] no longer using [] resolved per sleep study; hypertension [] medications; hyperlipidemia [] medications; GERD [] medications; DM [] insulin [] non-insulin [] no meds      No orders of the defined types were placed in this encounter. No orders of the defined types were placed in this encounter. Follow Up:  No follow-ups on file.     Nicol Yeh MD

## 2021-10-10 ENCOUNTER — TELEPHONE (OUTPATIENT)
Dept: SURGERY | Age: 51
End: 2021-10-10

## 2021-10-10 NOTE — TELEPHONE ENCOUNTER
Received a call from Kathya's daughter that she has not been able to keep down solids. She gets reflux and a burning sensation in her throat. She is tolerating a small amount of sugar free gatorade and continues to make urine. Daughter reports she is getting weaker and is hard to get out of bed. She was getting IV fluids/TPN at home up to about a week and a half ago. I asked the daughter to bring Sofie Hatchet to the ER if she is unable to tolerate fluids or urine output decreases. She may need to start getting IV fluids at home again but I do not know how to arrange this on a Sunday. I asked her to update the office tomorrow and this may possibly be arranged.     Electronically signed by Liane Kennedy MD on 10/10/2021 at 3:18 PM

## 2021-10-12 NOTE — TELEPHONE ENCOUNTER
Pt needs to be test for covid per dr. Jaden Thomas -- pt c/o is headache, body aches, low fever, super tired. - vm 2 vm's for pt to call back to the office.

## 2021-10-13 ENCOUNTER — HOSPITAL ENCOUNTER (EMERGENCY)
Age: 51
Discharge: HOME | End: 2021-10-13
Attending: EMERGENCY MEDICINE
Payer: COMMERCIAL

## 2021-10-13 ENCOUNTER — TELEPHONE (OUTPATIENT)
Dept: SURGERY | Age: 51
End: 2021-10-13

## 2021-10-13 VITALS
SYSTOLIC BLOOD PRESSURE: 114 MMHG | BODY MASS INDEX: 35.87 KG/M2 | RESPIRATION RATE: 18 BRPM | DIASTOLIC BLOOD PRESSURE: 73 MMHG | WEIGHT: 190 LBS | HEIGHT: 61 IN | OXYGEN SATURATION: 96 % | HEART RATE: 97 BPM | TEMPERATURE: 98.5 F

## 2021-10-13 DIAGNOSIS — R11.2 NAUSEA AND VOMITING, INTRACTABILITY OF VOMITING NOT SPECIFIED, UNSPECIFIED VOMITING TYPE: Primary | ICD-10-CM

## 2021-10-13 DIAGNOSIS — R51.9 NONINTRACTABLE HEADACHE, UNSPECIFIED CHRONICITY PATTERN, UNSPECIFIED HEADACHE TYPE: ICD-10-CM

## 2021-10-13 DIAGNOSIS — R52 BODY ACHES: ICD-10-CM

## 2021-10-13 DIAGNOSIS — R11.0 NAUSEA: ICD-10-CM

## 2021-10-13 DIAGNOSIS — R53.83 FATIGUE, UNSPECIFIED TYPE: ICD-10-CM

## 2021-10-13 DIAGNOSIS — R50.9 LOW GRADE FEVER: Primary | ICD-10-CM

## 2021-10-13 LAB
ALBUMIN SERPL-MCNC: 4.1 GM/DL (ref 3.4–5)
ALP BLD-CCNC: 97 IU/L (ref 40–129)
ALT SERPL-CCNC: 26 U/L (ref 10–40)
ANION GAP SERPL CALCULATED.3IONS-SCNC: 23 MMOL/L (ref 4–16)
AST SERPL-CCNC: 31 IU/L (ref 15–37)
BASOPHILS ABSOLUTE: 0 K/CU MM
BASOPHILS RELATIVE PERCENT: 0.7 % (ref 0–1)
BILIRUB SERPL-MCNC: 1.2 MG/DL (ref 0–1)
BUN BLDV-MCNC: 16 MG/DL (ref 6–23)
CALCIUM SERPL-MCNC: 10.5 MG/DL (ref 8.3–10.6)
CHLORIDE BLD-SCNC: 94 MMOL/L (ref 99–110)
CO2: 23 MMOL/L (ref 21–32)
CREAT SERPL-MCNC: 0.8 MG/DL (ref 0.6–1.1)
DIFFERENTIAL TYPE: ABNORMAL
EOSINOPHILS ABSOLUTE: 0.1 K/CU MM
EOSINOPHILS RELATIVE PERCENT: 2 % (ref 0–3)
GFR AFRICAN AMERICAN: >60 ML/MIN/1.73M2
GFR NON-AFRICAN AMERICAN: >60 ML/MIN/1.73M2
GLUCOSE BLD-MCNC: 109 MG/DL (ref 70–99)
HCT VFR BLD CALC: 53.6 % (ref 37–47)
HEMOGLOBIN: 17.2 GM/DL (ref 12.5–16)
IMMATURE NEUTROPHIL %: 0.2 % (ref 0–0.43)
LIPASE: 95 IU/L (ref 13–60)
LYMPHOCYTES ABSOLUTE: 0.6 K/CU MM
LYMPHOCYTES RELATIVE PERCENT: 13.1 % (ref 24–44)
MAGNESIUM: 1.9 MG/DL (ref 1.8–2.4)
MCH RBC QN AUTO: 29 PG (ref 27–31)
MCHC RBC AUTO-ENTMCNC: 32.1 % (ref 32–36)
MCV RBC AUTO: 90.4 FL (ref 78–100)
MONOCYTES ABSOLUTE: 0.7 K/CU MM
MONOCYTES RELATIVE PERCENT: 15.8 % (ref 0–4)
PDW BLD-RTO: 13.5 % (ref 11.7–14.9)
PLATELET # BLD: 155 K/CU MM (ref 140–440)
PMV BLD AUTO: 12.4 FL (ref 7.5–11.1)
POTASSIUM SERPL-SCNC: 3.5 MMOL/L (ref 3.5–5.1)
RBC # BLD: 5.93 M/CU MM (ref 4.2–5.4)
SEGMENTED NEUTROPHILS ABSOLUTE COUNT: 3 K/CU MM
SEGMENTED NEUTROPHILS RELATIVE PERCENT: 68.2 % (ref 36–66)
SODIUM BLD-SCNC: 140 MMOL/L (ref 135–145)
TOTAL IMMATURE NEUTOROPHIL: 0.01 K/CU MM
TOTAL PROTEIN: 7.6 GM/DL (ref 6.4–8.2)
WBC # BLD: 4.4 K/CU MM (ref 4–10.5)

## 2021-10-13 PROCEDURE — 96372 THER/PROPH/DIAG INJ SC/IM: CPT

## 2021-10-13 PROCEDURE — 80053 COMPREHEN METABOLIC PANEL: CPT

## 2021-10-13 PROCEDURE — 85025 COMPLETE CBC W/AUTO DIFF WBC: CPT

## 2021-10-13 PROCEDURE — 99284 EMERGENCY DEPT VISIT MOD MDM: CPT

## 2021-10-13 PROCEDURE — 83735 ASSAY OF MAGNESIUM: CPT

## 2021-10-13 PROCEDURE — 2580000003 HC RX 258: Performed by: EMERGENCY MEDICINE

## 2021-10-13 PROCEDURE — 83690 ASSAY OF LIPASE: CPT

## 2021-10-13 PROCEDURE — 6360000002 HC RX W HCPCS: Performed by: EMERGENCY MEDICINE

## 2021-10-13 RX ORDER — PROMETHAZINE HYDROCHLORIDE 25 MG/1
25 SUPPOSITORY RECTAL EVERY 6 HOURS PRN
Qty: 20 SUPPOSITORY | Refills: 0 | Status: SHIPPED | OUTPATIENT
Start: 2021-10-13 | End: 2021-10-20

## 2021-10-13 RX ORDER — 0.9 % SODIUM CHLORIDE 0.9 %
1000 INTRAVENOUS SOLUTION INTRAVENOUS ONCE
Status: COMPLETED | OUTPATIENT
Start: 2021-10-13 | End: 2021-10-13

## 2021-10-13 RX ORDER — PROMETHAZINE HYDROCHLORIDE 25 MG/ML
25 INJECTION, SOLUTION INTRAMUSCULAR; INTRAVENOUS ONCE
Status: COMPLETED | OUTPATIENT
Start: 2021-10-13 | End: 2021-10-13

## 2021-10-13 RX ADMIN — PROMETHAZINE HYDROCHLORIDE 25 MG: 25 INJECTION INTRAMUSCULAR; INTRAVENOUS at 20:20

## 2021-10-13 RX ADMIN — SODIUM CHLORIDE 1000 ML: 9 INJECTION, SOLUTION INTRAVENOUS at 20:19

## 2021-10-13 ASSESSMENT — PAIN DESCRIPTION - DESCRIPTORS: DESCRIPTORS: ACHING

## 2021-10-13 ASSESSMENT — PAIN DESCRIPTION - ORIENTATION: ORIENTATION: MID

## 2021-10-13 ASSESSMENT — PAIN DESCRIPTION - LOCATION: LOCATION: ABDOMEN

## 2021-10-13 ASSESSMENT — PAIN DESCRIPTION - FREQUENCY: FREQUENCY: INTERMITTENT

## 2021-10-13 NOTE — TELEPHONE ENCOUNTER
Office called pt again - [facility] -  Office to give pt the plan from Dr. Yaw Gutierrze. Home health has called twice - pt needs to have covid test completed - order has been placed.

## 2021-10-14 NOTE — ED PROVIDER NOTES
Triage Chief Complaint:   Emesis (reports of vomiting for 5-6 days )    [place]:  Oneyda Kruse is a 49 y.o. female that presents with 5 to 6 days of fairly intractable nausea nonbilious nonbloody emesis. States that she can only keep a small amount of fluids down and has not been eating much. States that she has been having normal bowel movements without constipation or diarrhea. States that she has a burning sensation in her epigastric region whenever she does eat or drink anything. Denies any chest pain, cough, shortness of breath, fevers, urinary symptoms. Patient had a gastric sleeve in August, was hospitalized at the end of that month for similar symptoms. She has been taking Zofran at home without improvement. Recently had PICC line removed after she was on TPN for a few weeks. ROS:  At least 10 systems reviewed and otherwise acutely negative except as in the [address] Past Medical History:   Diagnosis Date    COVID-19     \"had + covid test 4/2021- only symptom was headache\"- negative covid test 5/21/2021    H/O echocardiogram 02/25/2021    Mild LVH, AR, TR & MR.    History of kidney stones     last stone: 2006    Kidney stone     Wears dentures     full upper plate    Wears glasses     to read     Past Surgical History:   Procedure Laterality Date    APPENDECTOMY  2015?    [facility]    CYSTOSCOPY  2007?    stone manipulation    DILATION AND CURETTAGE OF UTERUS  1986    HYSTERECTOMY  2007    LITHOTRIPSY      ( not sure if had this done-per pt)    SLEEVE GASTRECTOMY N/A 8/4/2021    GASTRECTOMY SLEEVE LAPAROSCOPIC ROBOTIC  POSS HIATAL HERNIA REPAIR performed by Lito Snyder MD at [address] [place] [address] 5/28/2021    EGD BIOPSY performed by Lito Snyder MD at [facility] N/A 8/4/2021    EGD ESOPHAGOGASTRODUODENOSCOPY performed by Lito Snyder MD at [facility] OR     Family History   Problem Relation Age of Onset    Asthma Mother         copd    Heart Disease Mother         chf    Stroke Father      Social History     Socioeconomic History    Marital status:      Spouse name: Not on file    Number of children: Not on file    Years of education: Not on file    Highest education level: Not on file   Occupational History    Not on file   Tobacco Use    Smoking status: Never Smoker    Smokeless tobacco: Never Used   Vaping Use    Vaping Use: Never used   Substance and Sexual Activity    Alcohol use: No     Comment: average \"1-2 times per year    Drug use: No    Sexual activity: Yes   Other Topics Concern    Not on file   Social History Narrative    Not on file     Social Determinants of Health     Financial Resource Strain:     Difficulty of Paying Living Expenses:    Food Insecurity:     Worried About Running Out of Food in the Last Year:     Ran Out of Food in the Last Year:    Transportation Needs:     Lack of Transportation (Medical):  Lack of Transportation (Non-Medical):    Physical Activity:     Days of Exercise per Week:     Minutes of Exercise per Session:    Stress:     Feeling of Stress :    Social Connections:     Frequency of Communication with Friends and Family:     Frequency of Social Gatherings with Friends and Family:     Attends [employer] Services:     Active Member of Clubs or Organizations:     Attends Club or Organization Meetings:     Marital Status:    Intimate Partner Violence:     Fear of Current or Ex-Partner:     Emotionally Abused:     Physically Abused:     Sexually Abused:      No current facility-administered medications for this encounter.      Current Outpatient Medications   Medication Sig Dispense Refill    promethazine (PROMETHEGAN) 25 MG suppository Place 1 suppository rectally every 6 hours as needed for Nausea 20 suppository 0    ondansetron (ZOFRAN-ODT) 4 MG disintegrating tablet Take 1 tablet by mouth 3 times daily as needed for 15.8 (H) 0 - 4 %    Eosinophils % 2.0 0 - 3 %    Basophils % 0.7 0 - 1 %    Segs Absolute 3.0 K/CU MM    Lymphocytes Absolute 0.6 K/CU MM    Monocytes Absolute 0.7 K/CU MM    Eosinophils Absolute 0.1 K/CU MM    Basophils Absolute 0.0 K/CU MM    Immature Neutrophil % 0.2 0 - 0.43 %    Total Immature Neutrophil 0.01 K/CU MM   Comprehensive Metabolic Panel w/ Reflex to MG   Result Value Ref Range    Sodium 140 135 - 145 MMOL/L    Potassium 3.5 3.5 - 5.1 MMOL/L    Chloride 94 (L) 99 - 110 mMol/L    CO2 23 21 - 32 MMOL/L    BUN 16 6 - 23 MG/DL    CREATININE 0.8 0.6 - 1.1 MG/DL    Glucose 109 (H) 70 - 99 MG/DL    Calcium 10.5 8.3 - 10.6 MG/DL    Albumin 4.1 3.4 - 5.0 GM/DL    Total Protein 7.6 6.4 - 8.2 GM/DL    Total Bilirubin 1.2 (H) 0.0 - 1.0 MG/DL    ALT 26 10 - 40 U/L    AST 31 15 - 37 IU/L    Alkaline Phosphatase 97 40 - 129 IU/L    GFR Non-African American >60 >60 mL/min/1.73m2    GFR African American >60 >60 mL/min/1.73m2    Anion Gap 23 (H) 4 - 16   Lipase   Result Value Ref Range    Lipase 95 (H) 13 - 60 IU/L   Magnesium   Result Value Ref Range    Magnesium 1.9 1.8 - 2.4 mg/dl      Radiographs (if obtained):  [] The following radiograph was interpreted by myself in the absence of a radiologist:  [] Radiologist's Report Reviewed:    EKG (if obtained): (All EKG's are interpreted by myself in the absence of a cardiologist)    MDM:  Plan of care is discussed thoroughly with the patient and family if present. If performed, all imaging and lab work also discussed with patient. All relevant prior results and chart reviewed if available. Patient presents as above. She is in no acute distress. Vital signs significant for tachycardia. She has benign abdominal exam.  Presents with intractable nausea and vomiting. She has been having normal bowel movements. Low suspicion for any bowel obstruction given her overall presentation, abdominal exam.  She is given Phenergan and started on IV fluids.     Patient feeling better on reevaluation. Metabolic work-up was unremarkable. She does not have a leukocytosis. Lipase is downtrending from prior values. I feel that the patient is appropriate for discharge at this home on Phenergan suppositories and close follow-up with PCP. Patient is agreeable with this plan of care and discharged in good condition. Clinical Impression:  1.  Nausea and vomiting, intractability of vomiting not specified, unspecified vomiting type      (Please note that portions of this note may have been completed with a voice recognition program. Efforts were made to edit the dictations but occasionally words are mis-transcribed.)    MD De Hunt MD  10/13/21 [id number]

## 2021-10-31 NOTE — PROGRESS NOTES
Patient Name:  Fredrick Quan  Patient :  1970  Patient MRN:  [id number]     Primary Oncologist: Caroline Virgen MD  Referring Provider: Kurtis Phoenix, MD     Date of Service: 2021      Chief Complaint:    Chief Complaint   Patient presents with    Follow-up     Patient Active Problem List:     Lipoma of right forearm     Morbid obesity ([address]     Status post bariatric surgery     Status post laparoscopic sleeve gastrectomy     Lower abdominal pain     Intractable nausea and vomiting     Intractable vomiting     Portal vein thrombosis     Constipation     Severe malnutrition ([address]    HPI:   Fredrick Quan is a 49 y.o. female who presents with persistent nausea and vomiting. She had laparoscopic gastric sleeve 21. She was then seen in the ED 21 for abdominal pain. She reports over past three days she has had worsening nausea with frequent vomiting associated with abdominal pain.      21 CT abdomen/pelvis with findings suspicious for new left portal vein thrombus.      Follow up  US  Impression  The left portal vein thrombosis from the CT earlier today is not identified  at ultrasound.  The imaged portions of the main portal vein and left portal  vein demonstrate patency and appropriate direction of flow.  Findings could  represent partial resolution of thrombus.  Continued follow-up recommended.     She denies previous blood clot. No family history of blood clots. She has been on [facility] therapy since 2021. We recommend to pursue hypercoagulable studies at this time. While she has risk factors, including recent surgery, obesity and immobility, they may not be enough to explain portal vein thrombosis. We would like to rule out acquired or hereditary thrombophilia. We will plan for at least six months of anticoagulation if hypercoagulable work ups are negative. If she is found to have hypercoagulable state, we will recommend long term AC tehrapy.      Hypercoagulable work ups done on August 26, 2021 were within normal range. She was released from the hospital with Lovenox and with appointment to see me as an outpatient. On November 2, 2021, she presented to me for follow-up. Has been following her for portal vein thrombosis and I believe it is due to recent surgery. She is gradually getting better and she is tolerating regular food now. She does not have any more abdominal pain. I recommend her to continue with Xarelto on regular basis. I will give samples whenever she needs it since she could not afford the Xarelto cost.      Otherwise, I will see her back on February 25, 2022 and I will consider to stop anticoagulation therapy at that time. I will consider to have repeat blood test at that time including vitamin D level to make sure that she does not have any vitamin deficiency from gastric sleeve surgery. She does not have any significant symptoms at today visit. Past Medical History:     Significant for  1. History of kidney stones    Past Surgery History:    Significant for  1. Appendicectomy in 2015  2. Cholecystectomy in 1987  3. Cystoscopy and stone manipulation in 2007  4. Dilatation and curettage of the uterus in 1986  5. Hysterectomy 2007  6. Sleeve gastrectomy in August 4, 2021    Social History:   She denies smoking or illicit drug abuse. She socially drinks alcohol. Family History:    Significant for bronchial asthma and congestive heart failure in her mother and stroke in her father. Allergies:  Levofloxacin    Review of Systems: \"Per interval history; otherwise 10 point ROS is negative. \"  Her energy level is good, appetite, and sleep are stable. She denies fever, chills, night sweats, cough, shortness of breath, chest pain, hemoptysis, or palpitations. Her bowel and bladder functions are normal. She doesn't have nausea, vomiting, abdominal pain, diarrhea, constipation, dysuria, loss of appetite, or weight loss.  She denies neuropathy pain and she doesn't have bleeding issues. No anxiety or depression. The rest of the systems are unremarkable. Vital Signs: BP (!) 105/59 (Site: Right Upper Arm, Position: Sitting, Cuff Size: Large Adult)   Pulse 84   Temp 96.7 °F (35.9 °C) (Temporal)   Ht 5' 1\" (1.549 m)   Wt 204 lb 6.4 oz (92.7 kg)   SpO2 98%   BMI 38.62 kg/m²      Physical Exam:  CONSTITUTIONAL: awake, alert, cooperative, no apparent distress   EYES: pupils equal, round and reactive to light, sclera clear, normal conjunctiva  ENT: Normocephalic, without obvious abnormality, atraumatic  NECK: supple, symmetrical, no jugular venous distension, no carotid bruits   HEMATOLOGIC/LYMPHATIC: no cervical, supraclavicular or axillary lymphadenopathy   LUNGS: VBS, no wheezes, clear to auscultation, no crackles, no rhonchi, no increased work of breathing,    CARDIOVASCULAR: regular rate and rhythm, normal S1 and S2, no murmur noted  ABDOMEN: normal bowel sounds x 4, soft, non-distended, non-tender, no masses palpated, no hepatosplenomegaly   MUSCULOSKELETAL: full range of motion noted, tone is normal  NEUROLOGIC: awake, alert, oriented to name, place and time. Motor skills grossly intact. SKIN: appears intact, normal skin color, normal texture, normal turgor, no jaundice.    EXTREMITIES: no LE edema, no clubbing, no leg swelling, no cyanosis,        Labs:  Hematology:  Lab Results   Component Value Date    WBC 4.4 10/13/2021    RBC 5.93 (H) 10/13/2021    HGB 17.2 (H) 10/13/2021    HCT 53.6 (H) 10/13/2021    MCV 90.4 10/13/2021    MCH 29.0 10/13/2021    MCHC 32.1 10/13/2021    RDW 13.5 10/13/2021     10/13/2021    MPV 12.4 (H) 10/13/2021    SEGSPCT 68.2 (H) 10/13/2021    EOSRELPCT 2.0 10/13/2021    BASOPCT 0.7 10/13/2021    LYMPHOPCT 13.1 (L) 10/13/2021    MONOPCT 15.8 (H) 10/13/2021    SEGSABS 3.0 10/13/2021    EOSABS 0.1 10/13/2021    BASOSABS 0.0 10/13/2021    LYMPHSABS 0.6 10/13/2021    MONOSABS 0.7 10/13/2021    DIFFTYPE AUTOMATED DIFFERENTIAL 10/13/2021     No results found for: ESR  Chemistry:  Lab Results   Component Value Date     10/13/2021    K 3.5 10/13/2021    CL 94 (L) 10/13/2021    CO2 23 10/13/2021    BUN 16 10/13/2021    CREATININE 0.8 10/13/2021    GLUCOSE 109 (H) 10/13/2021    CALCIUM 10.5 10/13/2021    PROT 7.6 10/13/2021    LABALBU 4.1 10/13/2021    BILITOT 1.2 (H) 10/13/2021    ALKPHOS 97 10/13/2021    AST 31 10/13/2021    ALT 26 10/13/2021    LABGLOM >60 10/13/2021    GFRAA >60 10/13/2021    PHOS 2.8 09/27/2021    MG 1.9 10/13/2021    POCGLU 141 (H) 08/27/2021     Lab Results   Component Value Date    HOMOCYSTEINE 7.6 08/26/2021     No components found for: LD  No results found for: TSHHS, T4FREE, FT3  Immunology:  Lab Results   Component Value Date    PROT 7.6 10/13/2021     No results found for: Fitz Sprinkles, KLFLCR  No results found for: B2M  Coagulation Panel:  Lab Results   Component Value Date    PROTIME 13.3 08/28/2021    INR 1.03 08/28/2021    APTT 33.0 08/28/2021    DDIMER 311 (H) 12/25/2010     Anemia Panel:  Lab Results   Component Value Date    OGGHOVTL33 289.1 12/02/2020    FOLATE 7.9 12/02/2020     Tumor Markers:  No results found for: , CEA, , LABCA2, PSA     Observations:  PHQ-9 Total Score: 0 (11/2/2021 10:05 AM)       Assessment   Portal vein thrombosis    Plan:                                                                                                    Hypercoagulable work ups done on August 26, 2021 were within normal range. She was released from the hospital with Lovenox and with appointment to see me as an outpatient. On November 2, 2021, she presented to me for follow-up. Has been following her for portal vein thrombosis and I believe it is due to recent surgery. She is gradually getting better and she is tolerating regular food now. She does not have any more abdominal pain. I recommend her to continue with Xarelto on regular basis.   I will give samples whenever she needs it since she could not afford the Xarelto cost.      Otherwise, I will see her back on February 25, 2022 and I will consider to stop anticoagulation therapy at that time. I will consider to have repeat blood test at that time including vitamin D level to make sure that she does not have any vitamin deficiency from gastric sleeve surgery. I answered all her questions and concerns for today. I asked her to follow up with primary care physician on regular basis. Recent imaging and labs were reviewed and discussed with the patient.

## 2021-11-02 ENCOUNTER — HOSPITAL ENCOUNTER (OUTPATIENT)
Dept: INFUSION THERAPY | Age: 51
Discharge: HOME | End: 2021-11-02
Payer: COMMERCIAL

## 2021-11-02 ENCOUNTER — OFFICE VISIT (OUTPATIENT)
Dept: ONCOLOGY | Age: 51
End: 2021-11-02
Payer: COMMERCIAL

## 2021-11-02 VITALS
WEIGHT: 204.4 LBS | BODY MASS INDEX: 38.59 KG/M2 | HEIGHT: 61 IN | TEMPERATURE: 96.7 F | SYSTOLIC BLOOD PRESSURE: 105 MMHG | DIASTOLIC BLOOD PRESSURE: 59 MMHG | HEART RATE: 84 BPM | OXYGEN SATURATION: 98 %

## 2021-11-02 DIAGNOSIS — I81 PORTAL VEIN THROMBOSIS: Primary | ICD-10-CM

## 2021-11-02 PROCEDURE — 99213 OFFICE O/P EST LOW 20 MIN: CPT | Performed by: INTERNAL MEDICINE

## 2021-11-02 PROCEDURE — 3017F COLORECTAL CA SCREEN DOC REV: CPT | Performed by: INTERNAL MEDICINE

## 2021-11-02 PROCEDURE — G8417 CALC BMI ABV UP PARAM F/U: HCPCS | Performed by: INTERNAL MEDICINE

## 2021-11-02 PROCEDURE — 1036F TOBACCO NON-USER: CPT | Performed by: INTERNAL MEDICINE

## 2021-11-02 PROCEDURE — G8427 DOCREV CUR MEDS BY ELIG CLIN: HCPCS | Performed by: INTERNAL MEDICINE

## 2021-11-02 PROCEDURE — 99211 OFF/OP EST MAY X REQ PHY/QHP: CPT

## 2021-11-02 PROCEDURE — G8484 FLU IMMUNIZE NO ADMIN: HCPCS | Performed by: INTERNAL MEDICINE

## 2021-11-02 ASSESSMENT — PATIENT HEALTH QUESTIONNAIRE - PHQ9
SUM OF ALL RESPONSES TO PHQ QUESTIONS 1-9: 0
2. FEELING DOWN, DEPRESSED OR HOPELESS: 0
SUM OF ALL RESPONSES TO PHQ QUESTIONS 1-9: 0
SUM OF ALL RESPONSES TO PHQ9 QUESTIONS 1 & 2: 0
1. LITTLE INTEREST OR PLEASURE IN DOING THINGS: 0
SUM OF ALL RESPONSES TO PHQ QUESTIONS 1-9: 0

## 2021-11-02 NOTE — PROGRESS NOTES
MA Rooming Questions  Patient: Darcy Jaimes  MRN: [id number]    Date: 11/2/2021        1. Do you have any new issues?   no         2. Do you need any refills on medications?    no    3. Have you had any imaging done since your last visit? yes - 10/13    4. Have you been hospitalized or seen in the emergency room since your last visit here?   no    5. Did the patient have a depression screening completed today?  Yes    PHQ-9 Total Score: 0 (11/2/2021 10:05 AM)       PHQ-9 Given to (if applicable):               PHQ-9 Score (if applicable):                     [] Positive     []  Negative              Does question #9 need addressed (if applicable)                     [] Yes    []  No               Humaira Mins, CMA

## 2022-01-25 ENCOUNTER — APPOINTMENT (OUTPATIENT)
Dept: CT IMAGING | Age: 52
End: 2022-01-25
Payer: COMMERCIAL

## 2022-01-25 ENCOUNTER — HOSPITAL ENCOUNTER (EMERGENCY)
Age: 52
Discharge: HOME | End: 2022-01-25
Attending: EMERGENCY MEDICINE
Payer: COMMERCIAL

## 2022-01-25 ENCOUNTER — APPOINTMENT (OUTPATIENT)
Dept: GENERAL RADIOLOGY | Age: 52
End: 2022-01-25
Payer: COMMERCIAL

## 2022-01-25 VITALS
SYSTOLIC BLOOD PRESSURE: 101 MMHG | WEIGHT: 181 LBS | HEART RATE: 78 BPM | RESPIRATION RATE: 15 BRPM | DIASTOLIC BLOOD PRESSURE: 73 MMHG | HEIGHT: 61 IN | OXYGEN SATURATION: 100 % | BODY MASS INDEX: 34.17 KG/M2 | TEMPERATURE: 97.6 F

## 2022-01-25 DIAGNOSIS — S32.10XA CLOSED FRACTURE OF SACRUM AND COCCYX, INITIAL ENCOUNTER (HCC): ICD-10-CM

## 2022-01-25 DIAGNOSIS — S09.90XA CLOSED HEAD INJURY, INITIAL ENCOUNTER: Primary | ICD-10-CM

## 2022-01-25 DIAGNOSIS — S32.2XXA CLOSED FRACTURE OF SACRUM AND COCCYX, INITIAL ENCOUNTER (HCC): ICD-10-CM

## 2022-01-25 PROCEDURE — 72125 CT NECK SPINE W/O DYE: CPT

## 2022-01-25 PROCEDURE — 99283 EMERGENCY DEPT VISIT LOW MDM: CPT

## 2022-01-25 PROCEDURE — 70450 CT HEAD/BRAIN W/O DYE: CPT

## 2022-01-25 PROCEDURE — 72220 X-RAY EXAM SACRUM TAILBONE: CPT

## 2022-01-25 RX ORDER — DOCUSATE SODIUM 100 MG/1
100 CAPSULE, LIQUID FILLED ORAL 2 TIMES DAILY
Qty: 60 CAPSULE | Refills: 0 | Status: SHIPPED | OUTPATIENT
Start: 2022-01-25 | End: 2022-02-24

## 2022-01-25 RX ORDER — NAPROXEN 375 MG/1
375 TABLET ORAL 2 TIMES DAILY PRN
Qty: 60 TABLET | Refills: 0 | Status: SHIPPED | OUTPATIENT
Start: 2022-01-25 | End: 2022-03-18

## 2022-01-25 ASSESSMENT — PAIN SCALES - GENERAL: PAINLEVEL_OUTOF10: 6

## 2022-01-25 ASSESSMENT — PAIN DESCRIPTION - PAIN TYPE: TYPE: ACUTE PAIN

## 2022-01-25 NOTE — ED TRIAGE NOTES
Pt slip and fall this am hitting the back of her head and tailbone pain pt is on blood thinner Xeralto .  Pt alert and oriented x 4

## 2022-01-25 NOTE — ED PROVIDER NOTES
Emergency Department Encounter  [address]    Patient: Esteban Mcnally  MRN: [id number]  : 1970  Date of Evaluation: 2022  ED Provider: Leslie Calloway MD    Chief Complaint       Chief Complaint   Patient presents with    Fall     head and tailbone pain slip when exiting the truck      Vinayak is a 49 y.o. female who presents to the emergency department for evaluation status post head injury. Patient reports being in usual state of health until earlier on this morning. She states that she had just gotten a cup of coffee from a local store. When she was trying to get out of her truck she said the lid of the coffee cup came off spilled on her. When she exited the truck she slipped and fell and struck her head against the truck and landed on her buttocks. Patient states that she did not lose consciousness. There is no head injury in the past 2 weeks. She said she felt a little lightheaded after the injury occurred. Currently complaining of head pain and tailbone pain. She reports that she got in contact with her primary care physician who told her to come to the emergency department for evaluation. Patient denies head injury in the past 2 weeks. She is on Xarelto. Denies numbness tingling or weakness in upper or lower extremities or any extremity injury or discomfort. She has been ambulatory since the incident. Has not tried take any medications for her symptoms as of yet    ROS:     At least 10 systems reviewed and otherwise acutely negative except as in the [address]     Past History     Past Medical History:   Diagnosis Date    COVID-19     \"had + covid test 2021- only symptom was headache\"- negative covid test 2021    H/O echocardiogram 2021    Mild LVH, AR, TR & MR.    History of kidney stones     last stone:     Kidney stone     Wears dentures     full upper plate    Wears glasses     to read     Past Surgical History: Procedure Laterality Date    [facility]    CYSTOSCOPY  2007?    stone manipulation    DILATION AND CURETTAGE OF UTERUS  1986    HYSTERECTOMY  2007    LITHOTRIPSY      ( not sure if had this done-per pt)    SLEEVE GASTRECTOMY N/A 8/4/2021    GASTRECTOMY SLEEVE LAPAROSCOPIC ROBOTIC  POSS HIATAL HERNIA REPAIR performed by Kacy Voss MD at [facility] ENDOSCOPY N/A 5/28/2021    EGD BIOPSY performed by Kacy Voss MD at [place] [address] N/A 8/4/2021    EGD ESOPHAGOGASTRODUODENOSCOPY performed by Kacy Voss MD at [address] [place] [address] History     Socioeconomic History    Marital status:      Spouse name: None    Number of children: None    Years of education: None    Highest education level: None   Occupational History    None   Tobacco Use    Smoking status: Never Smoker    Smokeless tobacco: Never Used   Vaping Use    Vaping Use: Never used   Substance and Sexual Activity    Alcohol use: No     Comment: average \"1-2 times per year    Drug use: No    Sexual activity: Yes   Other Topics Concern    None   Social History Narrative    None     Social Determinants of Health     Financial Resource Strain:     Difficulty of Paying Living Expenses: Not on file   Food Insecurity:     Worried About Running Out of Food in the Last Year: Not on file    Bridget of Food in the Last Year: Not on file   Transportation Needs:     Lack of Transportation (Medical): Not on file    Lack of Transportation (Non-Medical):  Not on file   Physical Activity:     Days of Exercise per Week: Not on file    Minutes of Exercise per Session: Not on file   Stress:     Feeling of Stress : Not on file   Social Connections:     Frequency of Communication with Friends and Family: Not on file    Frequency of Social Gatherings with Friends and Family: Not on file    Attends Taoist Services: Not on file   [place] Active Member of Clubs or Organizations: Not on file    Attends Club or Organization Meetings: Not on file    Marital Status: Not on file   Intimate Partner Violence:     Fear of Current or Ex-Partner: Not on file    Emotionally Abused: Not on file    Physically Abused: Not on file    Sexually Abused: Not on file   Housing Stability:     Unable to Pay for Housing in the Last Year: Not on file    Number of Pari in the Last Year: Not on file    Unstable Housing in the Last Year: Not on file       Medications/Allergies     Previous Medications    ONDANSETRON (ZOFRAN-ODT) 4 MG DISINTEGRATING TABLET    Take 1 tablet by mouth 3 times daily as needed for Nausea or Vomiting    PANTOPRAZOLE (PROTONIX) 20 MG TABLET    Take 1 tablet by mouth 2 times daily    RIVAROXABAN (XARELTO) 20 MG TABS TABLET    Take 1 tablet by mouth daily (with breakfast)    SCOPOLAMINE (TRANSDERM-SCOP) TRANSDERMAL PATCH    Place 1 patch onto the skin every 72 hours     Allergies   Allergen Reactions    Levofloxacin Shortness Of Breath        Physical Exam       ED Triage Vitals [01/25/22 0937]   BP Temp Temp Source Pulse Resp SpO2 Height Weight   101/73 97.6 °F (36.4 °C) Infrared 78 15 100 % 5' 1\" (1.549 m) 181 lb (82.1 kg)     GENERAL APPEARANCE: Awake and alert. Cooperative. No acute distress. HEAD: Normocephalic. Atraumatic. No raccoon eyes, no lam sign, no tenderness over TM joints bilaterally. No evidence of intra septal hematoma. No appreciable depressed skull fracture. EYES: Sclera anicteric. Pupils equal round reactive to light extraocular movements are intact  ENT: Tolerates saliva. No trismus. Moist mucous membranes  NECK: Supple. Trachea midline. No meningismus  CARDIO: RRR. Radial pulse 2+. No murmurs rubs or gallops appreciated  LUNGS: Respirations unlabored. CTAB. No accessory muscle usage noted. No wheezes rales rhonchi or stridor. ABDOMEN: Soft. Non-distended. Non-tender.   No tenderness in right upper quadrant or right lower quadrant to deep palpation  EXTREMITIES: No acute deformities. No unilateral leg swelling or tenderness behind either one of calves No tenderness to palpation over bony prominence of clavicles and shoulders humerus elbowulnar or scaphoid bilaterally. Patient's pelvis is stable no tenderness over femurs knees tib-fib or ankle. No posterior cervical thoracic or lumbosacral bony tenderness or step-offs. No posterior rib tenderness or crepitus no anterior rib tenderness or crepitus  SKIN: Warm and dry. No erythema edema or rashes appreciated  NEUROLOGICAL:  Cranial nerves II through XII grossly intact. No gross facial drooping. Moves all 4 extremities spontaneously. PSYCHIATRIC: Normal mood. Alert and oriented x3. No reported active suicidality or homicidality. Diagnostics   Labs:  No results found for this visit on 01/25/22. Radiographs:  XR SACRUM COCCYX (MIN 2 VIEWS)    Result Date: 1/25/2022  EXAMINATION: THREE XRAY VIEWS OF THE SACRUM/COCCYX 1/25/2022 10:36 am COMPARISON: CT 08/25/2021 HISTORY: ORDERING SYSTEM PROVIDED HISTORY: fall. tailbone pain TECHNOLOGIST PROVIDED HISTORY: Reason for exam:->fall. tailbone pain Initial encounter FINDINGS: Mild degenerative changes of the hip joints. The pelvic ring is intact. Subtle deformity at the level of the sacrococcygeal junction without obvious fracture line. This is only seen on lateral view. No other acute fracture or dislocation is identified. Joint space and alignment otherwise maintained. Subtle deformity at the level of the sacrococcygeal junction seen only on the lateral view without obvious fracture line. A nondisplaced fracture cannot be excluded given area of pain. No other acute osseous abnormality.      CT HEAD WO CONTRAST    Result Date: 1/25/2022  EXAMINATION: CT OF THE HEAD WITHOUT CONTRAST  1/25/2022 10:36 am TECHNIQUE: CT of the head was performed without the administration of intravenous contrast. Dose modulation, iterative reconstruction, and/or weight based adjustment of the mA/kV was utilized to reduce the radiation dose to as low as reasonably achievable. COMPARISON: June 2, 2020 HISTORY: ORDERING SYSTEM PROVIDED HISTORY: fall, head injury TECHNOLOGIST PROVIDED HISTORY: Reason for exam:->fall, head injury Has a \"code stroke\" or \"stroke alert\" been called? ->No Decision Support Exception - unselect if not a suspected or confirmed emergency medical condition->Emergency Medical Condition (MA) Is the patient pregnant?->No FINDINGS: BRAIN/VENTRICLES: There is no acute intracranial hemorrhage, mass effect or midline shift. No abnormal extra-axial fluid collection. The gray-white differentiation is maintained without evidence of an acute infarct. There is no evidence of hydrocephalus. ORBITS: The visualized portion of the orbits demonstrate no acute abnormality. SINUSES: The visualized paranasal sinuses and mastoid air cells demonstrate no acute abnormality. SOFT TISSUES/SKULL:  No acute abnormality of the visualized skull or soft tissues. No acute intracranial abnormality. CT Cervical Spine WO Contrast    Result Date: 1/25/2022  EXAMINATION: CT OF THE CERVICAL SPINE WITHOUT CONTRAST 1/25/2022 10:36 am TECHNIQUE: CT of the cervical spine was performed without the administration of intravenous contrast. Multiplanar reformatted images are provided for review. Dose modulation, iterative reconstruction, and/or weight based adjustment of the mA/kV was utilized to reduce the radiation dose to as low as reasonably achievable. COMPARISON: None. HISTORY: ORDERING SYSTEM PROVIDED HISTORY: fall, head injury TECHNOLOGIST PROVIDED HISTORY: Reason for exam:->fall, head injury Decision Support Exception - unselect if not a suspected or confirmed emergency medical condition->Emergency Medical Condition (MA) Is the patient pregnant?->No FINDINGS: BONES/ALIGNMENT: There is no acute fracture or traumatic malalignment. DEGENERATIVE CHANGES: Mild multilevel degenerative changes are present. SOFT TISSUES: There is no prevertebral soft tissue swelling. No acute abnormality of the cervical spine. Procedures/EKG:       ED Course and MDM   In brief, Esteban Mcnally is a 49 y.o. female who presented to the emergency department for evaluation status post mechanical slip and fall with resultant head injury and tailbone injury. Based on patient's history and physical would be concerned about possible coccyx fracture. Low clinical suspicion for acute other fracture or dislocation at this time. I did review patient's imaging studies. I advised her that she may have a subtle fracture of her coccyx at this time. She should assume that she does so. Recommend use of inflatable donut for sitting as well as stool softeners. Close follow-up with primary care physician next 48 to 72 hours for reevaluation. Return precautions were discussed with her including but not limited to repeat head injury next 2 weeks, tractable nausea vomiting, intractable headache or any other concerning symptoms. She did express verbal understanding of these instructions. And does feel comfortable to be discharged home. I attempted to answer all of her questions the best my ability. ED Medication Orders (From admission, onward)    None          Final Impression      1. Closed head injury, initial encounter    2. Closed fracture of sacrum and coccyx, initial encounter [facility])      DISPOSITION           This patient was cared for in the setting of the COVID-19 pandemic, with nationwide stress on resources and staffing.     (Please note that portions of this note may have been completed with a voice recognition program. Efforts were made to edit the dictations but occasionally words are mis-transcribed.)    Leslie Calloway MD  [id number] MD  01/25/22 9694

## 2022-01-25 NOTE — Clinical Note
Yuliya Pinto was seen and treated in our emergency department on 1/25/2022. She may return to work on 01/27/2022. If you have any questions or concerns, please don't hesitate to call.       Leslie Calloway MD

## 2022-03-17 NOTE — PROGRESS NOTES
Patient Name:  Ariella Fernandez  Patient :  1970  Patient MRN:  [id number]     Primary Oncologist: aKrina Frias MD  Referring Provider: Karin Cifuentes MD     Date of Service: 3/18/2022      Chief Complaint:    Chief Complaint   Patient presents with    Follow-up     Patient Active Problem List:     Lipoma of right forearm     Morbid obesity ([address]     Status post bariatric surgery     Status post laparoscopic sleeve gastrectomy     Lower abdominal pain     Intractable nausea and vomiting     Intractable vomiting     Portal vein thrombosis     Constipation     Severe malnutrition ([address]    HPI:   Ariella Fernandez is a 49year-old female who presents with persistent nausea and vomiting. She had laparoscopic gastric sleeve 21. She was then seen in the ED 21 for abdominal pain. She reports over past three days she has had worsening nausea with frequent vomiting associated with abdominal pain.      21 CT abdomen/pelvis with findings suspicious for new left portal vein thrombus.      Follow up  US  Impression  The left portal vein thrombosis from the CT earlier today is not identified  at ultrasound.  The imaged portions of the main portal vein and left portal  vein demonstrate patency and appropriate direction of flow.  Findings could  represent partial resolution of thrombus.  Continued follow-up recommended.     She denies previous blood clot. No family history of blood clots. She has been on [facility] therapy since 2021. We recommend to pursue hypercoagulable studies at this time. While she has risk factors, including recent surgery, obesity and immobility, they may not be enough to explain portal vein thrombosis. We would like to rule out acquired or hereditary thrombophilia. We will plan for at least six months of anticoagulation if hypercoagulable work ups are negative. If she is found to have hypercoagulable state, we will recommend long term AC tehrapy.      Hypercoagulable work ups done on August 26, 2021 were within normal range. She was released from the hospital with Lovenox and with appointment to see me as an outpatient. On March 18, 2022, she presented to me for follow-up. I have been following her for portal vein thrombosis and I believe it is due to surgery. She is getting better and she is tolerating regular food now. She does not have any more abdominal pain. I recommend her to continue with Xarelto on regular basis. I will request CT abdomen/pelvis on 4/18/22 to make sure that her portal vein thrombosis is completely resolved. If repeat CT scan looks good, I will stop xarelto after that. I will request blood test on 4/15/22 including vitamin levels to make sure that she does not have any vitamin deficiency from gastric sleeve surgery. Health maintenance - I recommend her to have age-appropriate cancer screening, exercise, low-fat and low-sodium diet. She does not have any significant symptoms at today visit. Past Medical History:     Significant for  1. History of kidney stones    Past Surgery History:    Significant for  1. Appendicectomy in 2015  2. Cholecystectomy in 1987  3. Cystoscopy and stone manipulation in 2007  4. Dilatation and curettage of the uterus in 1986  5. Hysterectomy 2007  6. Sleeve gastrectomy in August 4, 2021    Social History:   She denies smoking or illicit drug abuse. She socially drinks alcohol. Family History:    Significant for bronchial asthma and congestive heart failure in her mother and stroke in her father. Allergies:  Levofloxacin    Review of Systems: \"Per interval history; otherwise 10 point ROS is negative. \"  Her energy level is stable and her sleep is fine. She doesn't have fever, chills, night sweats, cough, shortness of breath, chest pain, hemoptysis or palpitations.  Her bowel and bladder functions are normal. She denies nausea, vomiting, abdominal pain, diarrhea, constipation, dysuria, loss of appetite or weight loss. She doesn't have neuropathy and she denies bleeding issues. She doesn't have any pain in her body. Denies anxiety or depression. The rest of the systems are unremarkable. Vital Signs: BP (!) 101/54 (Site: Right Upper Arm, Position: Sitting, Cuff Size: Medium Adult)   Pulse 96   Temp 97.4 °F (36.3 °C) (Infrared)   Ht 5' 1\" (1.549 m)   Wt 180 lb 6.4 oz (81.8 kg)   SpO2 96%   BMI 34.09 kg/m²      Physical Exam:  CONSTITUTIONAL: awake, alert, cooperative, no apparent distress   EYES: pupils equal, round and reactive to light, sclera clear, normal conjunctiva  ENT: Normocephalic, without obvious abnormality, atraumatic  NECK: supple, symmetrical, no jugular venous distension, no carotid bruits   HEMATOLOGIC/LYMPHATIC: no cervical, supraclavicular or axillary lymphadenopathy   LUNGS: VBS, no wheezes, clear to auscultation, no crackles, no increased work of breathing, no rhonchi,     CARDIOVASCULAR: regular rate and rhythm, normal S1 and S2, no murmur noted  ABDOMEN: normal bowel sounds x 4, soft, non-distended, non-tender, no masses palpated, no hepatosplenomegaly   MUSCULOSKELETAL: full range of motion noted, tone is normal  NEUROLOGIC: awake, alert, oriented to name, place and time. Motor skills grossly intact. SKIN: appears intact, normal skin color, normal texture, normal turgor, no jaundice.    EXTREMITIES: no clubbing, no leg swelling, no LE edema, no cyanosis,        Labs:  Hematology:  Lab Results   Component Value Date    WBC 4.4 10/13/2021    RBC 5.93 (H) 10/13/2021    HGB 17.2 (H) 10/13/2021    HCT 53.6 (H) 10/13/2021    MCV 90.4 10/13/2021    MCH 29.0 10/13/2021    MCHC 32.1 10/13/2021    RDW 13.5 10/13/2021     10/13/2021    MPV 12.4 (H) 10/13/2021    SEGSPCT 68.2 (H) 10/13/2021    EOSRELPCT 2.0 10/13/2021    BASOPCT 0.7 10/13/2021    LYMPHOPCT 13.1 (L) 10/13/2021    MONOPCT 15.8 (H) 10/13/2021    SEGSABS 3.0 10/13/2021    EOSABS 0.1 10/13/2021    BASOSABS 0.0 10/13/2021    LYMPHSABS 0.6 10/13/2021    MONOSABS 0.7 10/13/2021    DIFFTYPE AUTOMATED DIFFERENTIAL 10/13/2021     No results found for: ESR  Chemistry:  Lab Results   Component Value Date     10/13/2021    K 3.5 10/13/2021    CL 94 (L) 10/13/2021    CO2 23 10/13/2021    BUN 16 10/13/2021    CREATININE 0.8 10/13/2021    GLUCOSE 109 (H) 10/13/2021    CALCIUM 10.5 10/13/2021    PROT 7.6 10/13/2021    LABALBU 4.1 10/13/2021    BILITOT 1.2 (H) 10/13/2021    ALKPHOS 97 10/13/2021    AST 31 10/13/2021    ALT 26 10/13/2021    LABGLOM >60 10/13/2021    GFRAA >60 10/13/2021    PHOS 2.8 09/27/2021    MG 1.9 10/13/2021    POCGLU 141 (H) 08/27/2021     Lab Results   Component Value Date    HOMOCYSTEINE 7.6 08/26/2021     No components found for: LD  No results found for: TSHHS, T4FREE, FT3  Immunology:  Lab Results   Component Value Date    PROT 7.6 10/13/2021     No results found for: SPRING [place]  No results found for: B2M  Coagulation Panel:  Lab Results   Component Value Date    PROTIME 13.3 08/28/2021    INR 1.03 08/28/2021    APTT 33.0 08/28/2021    DDIMER 311 (H) 12/25/2010     Anemia Panel:  Lab Results   Component Value Date    HNYRDGJB57 289.1 12/02/2020    FOLATE 7.9 12/02/2020     Tumor Markers:  No results found for: , CEA, , LABCA2, PSA     Observations:  No data recorded       Assessment   Portal vein thrombosis    Plan:                                                                                                    Hypercoagulable work ups done on August 26, 2021 were within normal range. She was released from the hospital with Lovenox and with appointment to see me as an outpatient. On March 18, 2022, she presented to me for follow-up. I have been following her for portal vein thrombosis and I believe it is due to surgery. She is getting better and she is tolerating regular food now. She does not have any more abdominal pain.      I recommend her to continue with Xarelto on regular basis. I will request CT abdomen/pelvis on 4/18/22 to make sure that her portal vein thrombosis is completely resolved. If repeat CT scan looks good, I will stop xarelto after that. I will request blood test on 4/15/22 including vitamin levels to make sure that she does not have any vitamin deficiency from gastric sleeve surgery. Health maintenance - I recommend her to have age-appropriate cancer screening, exercise, low-fat and low-sodium diet. I answered all her questions and concerns for today. I asked her to follow up with primary care physician on regular basis. Recent imaging and labs were reviewed and discussed with the patient.

## 2022-03-18 ENCOUNTER — OFFICE VISIT (OUTPATIENT)
Dept: ONCOLOGY | Age: 52
End: 2022-03-18
Payer: COMMERCIAL

## 2022-03-18 ENCOUNTER — HOSPITAL ENCOUNTER (OUTPATIENT)
Dept: INFUSION THERAPY | Age: 52
Discharge: HOME | End: 2022-03-18

## 2022-03-18 VITALS
WEIGHT: 180.4 LBS | HEIGHT: 61 IN | SYSTOLIC BLOOD PRESSURE: 101 MMHG | DIASTOLIC BLOOD PRESSURE: 54 MMHG | TEMPERATURE: 97.4 F | BODY MASS INDEX: 34.06 KG/M2 | HEART RATE: 96 BPM | OXYGEN SATURATION: 96 %

## 2022-03-18 DIAGNOSIS — I81 PORTAL VEIN THROMBOSIS: Primary | ICD-10-CM

## 2022-03-18 PROCEDURE — G8427 DOCREV CUR MEDS BY ELIG CLIN: HCPCS | Performed by: INTERNAL MEDICINE

## 2022-03-18 PROCEDURE — G8417 CALC BMI ABV UP PARAM F/U: HCPCS | Performed by: INTERNAL MEDICINE

## 2022-03-18 PROCEDURE — G8484 FLU IMMUNIZE NO ADMIN: HCPCS | Performed by: INTERNAL MEDICINE

## 2022-03-18 PROCEDURE — 3017F COLORECTAL CA SCREEN DOC REV: CPT | Performed by: INTERNAL MEDICINE

## 2022-03-18 PROCEDURE — 1036F TOBACCO NON-USER: CPT | Performed by: INTERNAL MEDICINE

## 2022-03-18 PROCEDURE — 99213 OFFICE O/P EST LOW 20 MIN: CPT | Performed by: INTERNAL MEDICINE

## 2022-03-18 NOTE — PROGRESS NOTES
MA Rooming Questions  Patient: Jonathon Roberto  MRN: [id number]    Date: 3/18/2022        1. Do you have any new issues? yes - Left calf numb since her surgery         2. Do you need any refills on medications? yes - Xarelto    3. Have you had any imaging done since your last visit?   no    4. Have you been hospitalized or seen in the emergency room since your last visit here?   no    5. Did the patient have a depression screening completed today?  No    No data recorded     PHQ-9 Given to (if applicable):               PHQ-9 Score (if applicable):                     [] Positive     []  Negative              Does question #9 need addressed (if applicable)                     [] Yes    []  No               Doy Roles, CMA

## 2022-03-23 ENCOUNTER — TELEPHONE (OUTPATIENT)
Dept: ONCOLOGY | Age: 52
End: 2022-03-23

## 2022-04-19 ENCOUNTER — HOSPITAL ENCOUNTER (OUTPATIENT)
Dept: INFUSION THERAPY | Age: 52
Discharge: HOME | End: 2022-04-19
Payer: COMMERCIAL

## 2022-04-19 DIAGNOSIS — I81 PORTAL VEIN THROMBOSIS: ICD-10-CM

## 2022-04-19 LAB
ALBUMIN SERPL-MCNC: 4.2 GM/DL (ref 3.4–5)
ALP BLD-CCNC: 60 IU/L (ref 40–129)
ALT SERPL-CCNC: 12 U/L (ref 10–40)
ANION GAP SERPL CALCULATED.3IONS-SCNC: 9 MMOL/L (ref 4–16)
AST SERPL-CCNC: 16 IU/L (ref 15–37)
BASOPHILS ABSOLUTE: 0 K/CU MM
BASOPHILS RELATIVE PERCENT: 0.3 % (ref 0–1)
BILIRUB SERPL-MCNC: 1.1 MG/DL (ref 0–1)
BUN BLDV-MCNC: 11 MG/DL (ref 6–23)
CALCIUM SERPL-MCNC: 9.7 MG/DL (ref 8.3–10.6)
CHLORIDE BLD-SCNC: 107 MMOL/L (ref 99–110)
CO2: 26 MMOL/L (ref 21–32)
CREAT SERPL-MCNC: 0.8 MG/DL (ref 0.6–1.1)
D DIMER: 491 NG/ML(DDU)
DIFFERENTIAL TYPE: ABNORMAL
EOSINOPHILS ABSOLUTE: 0.1 K/CU MM
EOSINOPHILS RELATIVE PERCENT: 1.7 % (ref 0–3)
FERRITIN: 107 NG/ML (ref 15–150)
GFR AFRICAN AMERICAN: >60 ML/MIN/1.73M2
GFR NON-AFRICAN AMERICAN: >60 ML/MIN/1.73M2
GLUCOSE BLD-MCNC: 83 MG/DL (ref 70–99)
HCT VFR BLD CALC: 42.9 % (ref 37–47)
HEMOGLOBIN: 14 GM/DL (ref 12.5–16)
IRON: 83 UG/DL (ref 37–145)
LYMPHOCYTES ABSOLUTE: 1.2 K/CU MM
LYMPHOCYTES RELATIVE PERCENT: 35.3 % (ref 24–44)
MCH RBC QN AUTO: 29.7 PG (ref 27–31)
MCHC RBC AUTO-ENTMCNC: 32.6 % (ref 32–36)
MCV RBC AUTO: 91.1 FL (ref 78–100)
MONOCYTES ABSOLUTE: 0.3 K/CU MM
MONOCYTES RELATIVE PERCENT: 8 % (ref 0–4)
PCT TRANSFERRIN: 34 % (ref 10–44)
PDW BLD-RTO: 13.8 % (ref 11.7–14.9)
PLATELET # BLD: 160 K/CU MM (ref 140–440)
PMV BLD AUTO: 10.2 FL (ref 7.5–11.1)
POTASSIUM SERPL-SCNC: 4.8 MMOL/L (ref 3.5–5.1)
RBC # BLD: 4.71 M/CU MM (ref 4.2–5.4)
SEGMENTED NEUTROPHILS ABSOLUTE COUNT: 1.9 K/CU MM
SEGMENTED NEUTROPHILS RELATIVE PERCENT: 54.7 % (ref 36–66)
SODIUM BLD-SCNC: 142 MMOL/L (ref 135–145)
TOTAL IRON BINDING CAPACITY: 244 UG/DL (ref 250–450)
TOTAL PROTEIN: 6.4 GM/DL (ref 6.4–8.2)
UNSATURATED IRON BINDING CAPACITY: 161 UG/DL (ref 110–370)
VITAMIN D 25-HYDROXY: 60 NG/ML
WBC # BLD: 3.5 K/CU MM (ref 4–10.5)

## 2022-04-19 PROCEDURE — 82728 ASSAY OF FERRITIN: CPT

## 2022-04-19 PROCEDURE — 82306 VITAMIN D 25 HYDROXY: CPT

## 2022-04-19 PROCEDURE — 82607 VITAMIN B-12: CPT

## 2022-04-19 PROCEDURE — 36415 COLL VENOUS BLD VENIPUNCTURE: CPT

## 2022-04-19 PROCEDURE — 85379 FIBRIN DEGRADATION QUANT: CPT

## 2022-04-19 PROCEDURE — 80053 COMPREHEN METABOLIC PANEL: CPT

## 2022-04-19 PROCEDURE — 83550 IRON BINDING TEST: CPT

## 2022-04-19 PROCEDURE — 85025 COMPLETE CBC W/AUTO DIFF WBC: CPT

## 2022-04-19 PROCEDURE — 82746 ASSAY OF FOLIC ACID SERUM: CPT

## 2022-04-19 PROCEDURE — 83540 ASSAY OF IRON: CPT

## 2022-04-20 ENCOUNTER — HOSPITAL ENCOUNTER (OUTPATIENT)
Dept: CT IMAGING | Age: 52
Discharge: HOME | End: 2022-04-20
Payer: COMMERCIAL

## 2022-04-20 DIAGNOSIS — I81 PORTAL VEIN THROMBOSIS: ICD-10-CM

## 2022-04-20 PROCEDURE — 6360000004 HC RX CONTRAST MEDICATION: Performed by: INTERNAL MEDICINE

## 2022-04-20 PROCEDURE — 74177 CT ABD & PELVIS W/CONTRAST: CPT

## 2022-04-20 RX ORDER — SODIUM CHLORIDE 0.9 % (FLUSH) 0.9 %
5-40 SYRINGE (ML) INJECTION PRN
Status: DISCONTINUED | OUTPATIENT
Start: 2022-04-20 | End: 2022-04-21 | Stop reason: HOSPADM

## 2022-04-20 RX ADMIN — IOHEXOL 50 ML: 240 INJECTION, SOLUTION INTRATHECAL; INTRAVASCULAR; INTRAVENOUS; ORAL at 09:20

## 2022-04-20 RX ADMIN — IOPAMIDOL 75 ML: 755 INJECTION, SOLUTION INTRAVENOUS at 10:35

## 2022-04-23 LAB
FOLATE: 4.9 NG/ML (ref 3.1–17.5)
VITAMIN B-12: 214.2 PG/ML (ref 211–911)

## 2022-04-24 NOTE — PROGRESS NOTES
Patient Name:  Giulia Wiley  Patient :  1970  Patient MRN:  [id number]     Primary Oncologist: Wilma Tilley MD  Referring Provider: Anita Ryan MD     Date of Service: 2022      Chief Complaint:    Chief Complaint   Patient presents with   [address]     Patient Active Problem List:     Lipoma of right forearm     Morbid obesity ([address]     Status post bariatric surgery     Status post laparoscopic sleeve gastrectomy     Lower abdominal pain     Intractable nausea and vomiting     Intractable vomiting     Portal vein thrombosis     Constipation     Severe malnutrition ([address]    HPI:   Giulia Wiley is a 49year-old female who presents with persistent nausea and vomiting. She had laparoscopic gastric sleeve 21. She was then seen in the ED 21 for abdominal pain. She reports over past three days she has had worsening nausea with frequent vomiting associated with abdominal pain.      21 CT abdomen/pelvis with findings suspicious for new left portal vein thrombus.      Follow up  US  Impression  The left portal vein thrombosis from the CT earlier today is not identified  at ultrasound.  The imaged portions of the main portal vein and left portal  vein demonstrate patency and appropriate direction of flow.  Findings could  represent partial resolution of thrombus.  Continued follow-up recommended.     She denies previous blood clot. No family history of blood clots. She has been on [facility] [place] therapy since 2021. We recommend to pursue hypercoagulable studies at this time. While she has risk factors, including recent surgery, obesity and immobility, they may not be enough to explain portal vein thrombosis. We would like to rule out acquired or hereditary thrombophilia. We will plan for at least six months of anticoagulation if hypercoagulable work ups are negative. If she is found to have hypercoagulable state, we will recommend long term AC tehrapy.      Hypercoagulable work ups done on August 26, 2021 were within normal range. She was released from the hospital with Lovenox and with appointment to see me as an outpatient. Later changed to Jason Boogie. We stopped xarelto on 4/26/22. CT abdomen/pelvis done on 4/20/22 showed resolution of previously seen left portal venous thrombosis now with normal enhancement of the portal vein. No evidence of portal venous thrombosis on today's examination. Mild hepatic steatosis and status postcholecystectomy. On April 26, 2022, she presented to me for follow-up. I have been following her for portal vein thrombosis and I believe it is due to surgery. She is getting better and she is tolerating regular food now. She does not have any more abdominal pain. Since repeat CT showed complete resolution of portal vein thrombosis, I recommend to stop xarelto today. Reviewed blood test on 4/19/2. She was noted to have low normal range B12 level and she had gastric sleeve surgery before, I recommend to start vitamin B12 1000 mcg SL daily today. I also ask her to take multivitamins gummies daily. I will recheck her blood test in 6 months. Health maintenance - I recommend her to have age-appropriate cancer screening, exercise, low-fat and low-sodium diet. She does not have any significant symptoms at today visit. Past Medical History:     Significant for  1. History of kidney stones    Past Surgery History:    Significant for  1. Appendicectomy in 2015  2. Cholecystectomy in 1987  3. Cystoscopy and stone manipulation in 2007  4. Dilatation and curettage of the uterus in 1986  5. Hysterectomy 2007  6. Sleeve gastrectomy in August 4, 2021    Social History:   She denies smoking or illicit drug abuse. She socially drinks alcohol. Family History:    Significant for bronchial asthma and congestive heart failure in her mother and stroke in her father. Allergies:  Levofloxacin    Review of Systems:   \"Per interval history; otherwise 10 point ROS is negative. \"  Her energy level is fair and her sleep is good. She denies fever, chills, night sweats, cough, shortness of breath, chest pain, hemoptysis or palpitations. Her bowel and bladder functions are normal. She doesn't have nausea, vomiting, abdominal pain, diarrhea, constipation, dysuria, loss of appetite or weight loss. She denies neuropathy and she doesn't have bleeding issues. She denies any pain in her body. No anxiety or depression. The rest of the systems are unremarkable. Vital Signs: /63 (Site: Right Upper Arm, Position: Sitting, Cuff Size: Medium Adult)   Pulse 65   Temp 96.9 °F (36.1 °C) (Infrared)   Resp 16   Ht 5' 1\" (1.549 m)   Wt 179 lb 3.2 oz (81.3 kg)   SpO2 99%   BMI 33.86 kg/m²      Physical Exam:  CONSTITUTIONAL: awake, alert, cooperative, no apparent distress   EYES: pupils equal, round and reactive to light, sclera clear, normal conjunctiva  ENT: Normocephalic, without obvious abnormality, atraumatic  NECK: supple, symmetrical, no jugular venous distension, no carotid bruits   HEMATOLOGIC/LYMPHATIC: no cervical, supraclavicular or axillary lymphadenopathy   LUNGS: VBS, no wheezes, no increased work of breathing, no rhonchi, clear to auscultation, no crackles,      CARDIOVASCULAR: regular rate and rhythm, normal S1 and S2, no murmur noted  ABDOMEN: normal bowel sounds x 4, soft, non-distended, non-tender, no masses palpated, no hepatosplenomegaly   MUSCULOSKELETAL: full range of motion noted, tone is normal  NEUROLOGIC: awake, alert, oriented to name, place and time. Motor skills grossly intact. SKIN: appears intact, normal skin color, normal texture, normal turgor, no jaundice.    EXTREMITIES: no clubbing, no cyanosis, no leg swelling, no LE edema,        Labs:  Hematology:  Lab Results   Component Value Date    WBC 3.5 (L) 04/19/2022    RBC 4.71 04/19/2022    HGB 14.0 04/19/2022    HCT 42.9 04/19/2022    MCV 91.1 04/19/2022    MCH 29.7 04/19/2022    MCHC 32.6 04/19/2022    RDW 13.8 04/19/2022     04/19/2022    MPV 10.2 04/19/2022    SEGSPCT 54.7 04/19/2022    EOSRELPCT 1.7 04/19/2022    BASOPCT 0.3 04/19/2022    LYMPHOPCT 35.3 04/19/2022    MONOPCT 8.0 (H) 04/19/2022    SEGSABS 1.9 04/19/2022    EOSABS 0.1 04/19/2022    BASOSABS 0.0 04/19/2022    LYMPHSABS 1.2 04/19/2022    MONOSABS 0.3 04/19/2022    DIFFTYPE AUTOMATED DIFFERENTIAL 04/19/2022     No results found for: ESR  Chemistry:  Lab Results   Component Value Date     04/19/2022    K 4.8 04/19/2022     04/19/2022    CO2 26 04/19/2022    BUN 11 04/19/2022    CREATININE 0.8 04/19/2022    GLUCOSE 83 04/19/2022    CALCIUM 9.7 04/19/2022    PROT 6.4 04/19/2022    LABALBU 4.2 04/19/2022    BILITOT 1.1 (H) 04/19/2022    ALKPHOS 60 04/19/2022    AST 16 04/19/2022    ALT 12 04/19/2022    LABGLOM >60 04/19/2022    GFRAA >60 04/19/2022    PHOS 2.8 09/27/2021    MG 1.9 10/13/2021    POCGLU 141 (H) 08/27/2021     Lab Results   Component Value Date    HOMOCYSTEINE 7.6 08/26/2021     No components found for: LD  No results found for: TSHHS, T4FREE, FT3  Immunology:  Lab Results   Component Value Date    PROT 6.4 04/19/2022     No results found for: Blease Didi, KLFLCR  No results found for: B2M  Coagulation Panel:  Lab Results   Component Value Date    PROTIME 13.3 08/28/2021    INR 1.03 08/28/2021    APTT 33.0 08/28/2021    DDIMER 491 (H) 04/19/2022     Anemia Panel:  Lab Results   Component Value Date    CTDSOTHB09 214.2 04/19/2022    FOLATE 4.9 04/19/2022     Tumor Markers:  No results found for: , CEA, , LABCA2, PSA     Observations:  PHQ-9 Total Score: 0 (4/26/2022 11:40 AM)       Assessment   Portal vein thrombosis    Plan:                                                                                                    Hypercoagulable work ups done on August 26, 2021 were within normal range.   She was released from the hospital with Lovenox and with appointment to see me as an outpatient. Later changed to [place] . We stopped xarelto on 4/26/22. CT abdomen/pelvis done on 4/20/22 showed resolution of previously seen left portal venous thrombosis now with normal enhancement of the portal vein. No evidence of portal venous thrombosis on today's examination. Mild hepatic steatosis and status postcholecystectomy. On April 26, 2022, she presented to me for follow-up. I have been following her for portal vein thrombosis and I believe it is due to surgery. She is getting better and she is tolerating regular food now. She does not have any more abdominal pain. Since repeat CT showed complete resolution of portal vein thrombosis, I recommend to stop xarelto today. Reviewed blood test on 4/19/2. She was noted to have low normal range B12 level and she had gastric sleeve surgery before, I recommend to start vitamin B12 1000 mcg SL daily today. I also ask her to take multivitamins gummies daily. I will recheck her blood test in 6 months. Health maintenance - I recommend her to have age-appropriate cancer screening, exercise, low-fat and low-sodium diet. I answered all her questions and concerns for today. I asked her to follow up with primary care physician on regular basis. Recent imaging and labs were reviewed and discussed with the patient.

## 2022-04-26 ENCOUNTER — HOSPITAL ENCOUNTER (OUTPATIENT)
Dept: INFUSION THERAPY | Age: 52
Discharge: HOME | End: 2022-04-26

## 2022-04-26 ENCOUNTER — OFFICE VISIT (OUTPATIENT)
Dept: ONCOLOGY | Age: 52
End: 2022-04-26
Payer: COMMERCIAL

## 2022-04-26 VITALS
DIASTOLIC BLOOD PRESSURE: 63 MMHG | OXYGEN SATURATION: 99 % | TEMPERATURE: 96.9 F | SYSTOLIC BLOOD PRESSURE: 112 MMHG | WEIGHT: 179.2 LBS | HEIGHT: 61 IN | RESPIRATION RATE: 16 BRPM | BODY MASS INDEX: 33.83 KG/M2 | HEART RATE: 65 BPM

## 2022-04-26 DIAGNOSIS — I81 PORTAL VEIN THROMBOSIS: Primary | ICD-10-CM

## 2022-04-26 DIAGNOSIS — Z98.84 STATUS POST LAPAROSCOPIC SLEEVE GASTRECTOMY: ICD-10-CM

## 2022-04-26 PROCEDURE — 3017F COLORECTAL CA SCREEN DOC REV: CPT | Performed by: INTERNAL MEDICINE

## 2022-04-26 PROCEDURE — 99213 OFFICE O/P EST LOW 20 MIN: CPT | Performed by: INTERNAL MEDICINE

## 2022-04-26 PROCEDURE — G8427 DOCREV CUR MEDS BY ELIG CLIN: HCPCS | Performed by: INTERNAL MEDICINE

## 2022-04-26 PROCEDURE — G8417 CALC BMI ABV UP PARAM F/U: HCPCS | Performed by: INTERNAL MEDICINE

## 2022-04-26 PROCEDURE — 1036F TOBACCO NON-USER: CPT | Performed by: INTERNAL MEDICINE

## 2022-04-26 RX ORDER — MAGNESIUM 200 MG
1 TABLET ORAL DAILY
Qty: 30 TABLET | Refills: 5 | Status: SHIPPED | OUTPATIENT
Start: 2022-04-26

## 2022-04-26 ASSESSMENT — PATIENT HEALTH QUESTIONNAIRE - PHQ9
1. LITTLE INTEREST OR PLEASURE IN DOING THINGS: 0
2. FEELING DOWN, DEPRESSED OR HOPELESS: 0
SUM OF ALL RESPONSES TO PHQ QUESTIONS 1-9: 0
SUM OF ALL RESPONSES TO PHQ QUESTIONS 1-9: 0
SUM OF ALL RESPONSES TO PHQ9 QUESTIONS 1 & 2: 0
SUM OF ALL RESPONSES TO PHQ QUESTIONS 1-9: 0
SUM OF ALL RESPONSES TO PHQ QUESTIONS 1-9: 0

## 2022-04-26 NOTE — PROGRESS NOTES
MA Rooming Questions  Patient: Maralee Litten  MRN: [id number]    Date: 4/26/2022        1. Do you have any new issues?   no         2. Do you need any refills on medications?    no    3. Have you had any imaging done since your last visit? yes - CT    4. Have you been hospitalized or seen in the emergency room since your last visit here?   no    5. Did the patient have a depression screening completed today?  Yes    PHQ-9 Total Score: 0 (4/26/2022 11:40 AM)       PHQ-9 Given to (if applicable):               PHQ-9 Score (if applicable):                     [] Positive     []  Negative              Does question #9 need addressed (if applicable)                     [] Yes    []  No               Severa Both, CMA

## 2022-07-12 ENCOUNTER — TELEPHONE (OUTPATIENT)
Dept: BARIATRICS/WEIGHT MGMT | Age: 52
End: 2022-07-12

## 2022-12-12 ENCOUNTER — HOSPITAL ENCOUNTER (OUTPATIENT)
Dept: INFUSION THERAPY | Age: 52
Discharge: HOME | End: 2022-12-12
Payer: COMMERCIAL

## 2022-12-12 DIAGNOSIS — Z98.84 STATUS POST LAPAROSCOPIC SLEEVE GASTRECTOMY: ICD-10-CM

## 2022-12-12 DIAGNOSIS — I81 PORTAL VEIN THROMBOSIS: ICD-10-CM

## 2022-12-12 LAB
ALBUMIN SERPL-MCNC: 4.2 GM/DL (ref 3.4–5)
ALP BLD-CCNC: 58 IU/L (ref 40–129)
ALT SERPL-CCNC: 10 U/L (ref 10–40)
ANION GAP SERPL CALCULATED.3IONS-SCNC: 10 MMOL/L (ref 4–16)
AST SERPL-CCNC: 14 IU/L (ref 15–37)
BASOPHILS ABSOLUTE: 0 K/CU MM
BASOPHILS RELATIVE PERCENT: 0.3 % (ref 0–1)
BILIRUB SERPL-MCNC: 1.2 MG/DL (ref 0–1)
BUN BLDV-MCNC: 13 MG/DL (ref 6–23)
CALCIUM SERPL-MCNC: 9.4 MG/DL (ref 8.3–10.6)
CHLORIDE BLD-SCNC: 104 MMOL/L (ref 99–110)
CO2: 26 MMOL/L (ref 21–32)
CREAT SERPL-MCNC: 0.7 MG/DL (ref 0.6–1.1)
DIFFERENTIAL TYPE: ABNORMAL
EOSINOPHILS ABSOLUTE: 0.1 K/CU MM
EOSINOPHILS RELATIVE PERCENT: 1.4 % (ref 0–3)
FERRITIN: 108 NG/ML (ref 15–150)
FOLATE: 4.5 NG/ML (ref 3.1–17.5)
GFR SERPL CREATININE-BSD FRML MDRD: >60 ML/MIN/1.73M2
GLUCOSE BLD-MCNC: 119 MG/DL (ref 70–99)
HCT VFR BLD CALC: 40.8 % (ref 37–47)
HEMOGLOBIN: 13.3 GM/DL (ref 12.5–16)
IRON: 87 UG/DL (ref 37–145)
LYMPHOCYTES ABSOLUTE: 1.3 K/CU MM
LYMPHOCYTES RELATIVE PERCENT: 35 % (ref 24–44)
MCH RBC QN AUTO: 29.4 PG (ref 27–31)
MCHC RBC AUTO-ENTMCNC: 32.6 % (ref 32–36)
MCV RBC AUTO: 90.1 FL (ref 78–100)
MONOCYTES ABSOLUTE: 0.3 K/CU MM
MONOCYTES RELATIVE PERCENT: 6.8 % (ref 0–4)
PCT TRANSFERRIN: 36 % (ref 10–44)
PDW BLD-RTO: 13.6 % (ref 11.7–14.9)
PLATELET # BLD: 140 K/CU MM (ref 140–440)
PMV BLD AUTO: 10.3 FL (ref 7.5–11.1)
POTASSIUM SERPL-SCNC: 4.2 MMOL/L (ref 3.5–5.1)
RBC # BLD: 4.53 M/CU MM (ref 4.2–5.4)
SEGMENTED NEUTROPHILS ABSOLUTE COUNT: 2.1 K/CU MM
SEGMENTED NEUTROPHILS RELATIVE PERCENT: 56.5 % (ref 36–66)
SODIUM BLD-SCNC: 140 MMOL/L (ref 135–145)
TOTAL IRON BINDING CAPACITY: 242 UG/DL (ref 250–450)
TOTAL PROTEIN: 6.3 GM/DL (ref 6.4–8.2)
UNSATURATED IRON BINDING CAPACITY: 155 UG/DL (ref 110–370)
VITAMIN B-12: 1126 PG/ML (ref 211–911)
VITAMIN D 25-HYDROXY: 54.62 NG/ML
WBC # BLD: 3.7 K/CU MM (ref 4–10.5)

## 2022-12-12 PROCEDURE — 83550 IRON BINDING TEST: CPT

## 2022-12-12 PROCEDURE — 80053 COMPREHEN METABOLIC PANEL: CPT

## 2022-12-12 PROCEDURE — 82306 VITAMIN D 25 HYDROXY: CPT

## 2022-12-12 PROCEDURE — 36415 COLL VENOUS BLD VENIPUNCTURE: CPT

## 2022-12-12 PROCEDURE — 85025 COMPLETE CBC W/AUTO DIFF WBC: CPT

## 2022-12-12 PROCEDURE — 83540 ASSAY OF IRON: CPT

## 2022-12-12 PROCEDURE — 82746 ASSAY OF FOLIC ACID SERUM: CPT

## 2022-12-12 PROCEDURE — 82607 VITAMIN B-12: CPT

## 2022-12-12 PROCEDURE — 82728 ASSAY OF FERRITIN: CPT

## 2022-12-13 ENCOUNTER — HOSPITAL ENCOUNTER (OUTPATIENT)
Dept: INFUSION THERAPY | Age: 52
Discharge: HOME | End: 2022-12-13
Payer: COMMERCIAL

## 2022-12-13 ENCOUNTER — OFFICE VISIT (OUTPATIENT)
Dept: ONCOLOGY | Age: 52
End: 2022-12-13
Payer: COMMERCIAL

## 2022-12-13 VITALS
HEART RATE: 61 BPM | RESPIRATION RATE: 18 BRPM | BODY MASS INDEX: 31.91 KG/M2 | TEMPERATURE: 97.5 F | SYSTOLIC BLOOD PRESSURE: 110 MMHG | DIASTOLIC BLOOD PRESSURE: 59 MMHG | OXYGEN SATURATION: 99 % | WEIGHT: 169 LBS | HEIGHT: 61 IN

## 2022-12-13 DIAGNOSIS — Z98.84 STATUS POST LAPAROSCOPIC SLEEVE GASTRECTOMY: Primary | ICD-10-CM

## 2022-12-13 DIAGNOSIS — I81 PORTAL VEIN THROMBOSIS: ICD-10-CM

## 2022-12-13 PROCEDURE — G8427 DOCREV CUR MEDS BY ELIG CLIN: HCPCS | Performed by: INTERNAL MEDICINE

## 2022-12-13 PROCEDURE — 3017F COLORECTAL CA SCREEN DOC REV: CPT | Performed by: INTERNAL MEDICINE

## 2022-12-13 PROCEDURE — 99214 OFFICE O/P EST MOD 30 MIN: CPT | Performed by: INTERNAL MEDICINE

## 2022-12-13 PROCEDURE — G8417 CALC BMI ABV UP PARAM F/U: HCPCS | Performed by: INTERNAL MEDICINE

## 2022-12-13 PROCEDURE — 99211 OFF/OP EST MAY X REQ PHY/QHP: CPT

## 2022-12-13 PROCEDURE — G8484 FLU IMMUNIZE NO ADMIN: HCPCS | Performed by: INTERNAL MEDICINE

## 2022-12-13 PROCEDURE — 1036F TOBACCO NON-USER: CPT | Performed by: INTERNAL MEDICINE

## 2022-12-13 RX ORDER — GLUCOSAMINE/D3/BOSWELLIA SERRA 1500MG-400
1 TABLET ORAL DAILY
Qty: 30 TABLET | Refills: 5 | Status: SHIPPED | OUTPATIENT
Start: 2022-12-13 | End: 2023-01-12

## 2022-12-13 RX ORDER — MAGNESIUM 200 MG
1 TABLET ORAL DAILY
Qty: 30 TABLET | Refills: 5 | Status: SHIPPED | OUTPATIENT
Start: 2022-12-13

## 2022-12-13 RX ORDER — AMOXICILLIN AND CLAVULANATE POTASSIUM 875; 125 MG/1; MG/1
1 TABLET, FILM COATED ORAL 2 TIMES DAILY
Qty: 20 TABLET | Refills: 0 | Status: SHIPPED | OUTPATIENT
Start: 2022-12-13 | End: 2022-12-23

## 2022-12-13 ASSESSMENT — PATIENT HEALTH QUESTIONNAIRE - PHQ9
2. FEELING DOWN, DEPRESSED OR HOPELESS: 0
SUM OF ALL RESPONSES TO PHQ9 QUESTIONS 1 & 2: 0
1. LITTLE INTEREST OR PLEASURE IN DOING THINGS: 0
SUM OF ALL RESPONSES TO PHQ QUESTIONS 1-9: 0

## 2022-12-13 NOTE — PROGRESS NOTES
Patient Name:  Nura Caba  Patient :  1970  Patient MRN:  [id number]     Primary Oncologist: Parvez Hernandez MD  Referring Provider: Lonnie Adams MD     Date of Service: 2022      Chief Complaint:    Chief Complaint   Patient presents with    Follow-up       Patient Active Problem List:     Lipoma of right forearm     Morbid obesity ([facility] [address]     Status post bariatric surgery     Status post laparoscopic sleeve gastrectomy     Lower abdominal pain     Intractable nausea and vomiting     Intractable vomiting     Portal vein thrombosis     Constipation     Severe malnutrition ([address]    HPI:   Nura Caba is a 47year-old female who presents with persistent nausea and vomiting. She had laparoscopic gastric sleeve 21. She was then seen in the ED 21 for abdominal pain. She reports over past three days she has had worsening nausea with frequent vomiting associated with abdominal pain. 21 CT abdomen/pelvis with findings suspicious for new left portal vein thrombus. Follow up  US  Impression  The left portal vein thrombosis from the CT earlier today is not identified  at ultrasound. The imaged portions of the main portal vein and left portal  vein demonstrate patency and appropriate direction of flow. Findings could  represent partial resolution of thrombus. Continued follow-up recommended. She denies previous blood clot. No family history of blood clots. She has been on [facility] therapy since 2021. We recommend to pursue hypercoagulable studies at this time. While she has risk factors, including recent surgery, obesity and immobility, they may not be enough to explain portal vein thrombosis. We would like to rule out acquired or hereditary thrombophilia. We will plan for at least six months of anticoagulation if hypercoagulable work ups are negative. If she is found to have hypercoagulable state, we will recommend long term AC tehrapy.      Hypercoagulable work ups done on August 26, 2021 were within normal range. She was released from the hospital with Lovenox and with appointment to see me as an outpatient. Later changed to Jason Boogie. We stopped xarelto on 4/26/22. CT abdomen/pelvis done on 4/20/22 showed resolution of previously seen left portal venous thrombosis now with normal enhancement of the portal vein. No evidence of portal venous thrombosis on today's examination. Mild hepatic steatosis and status postcholecystectomy. On December 13, 2022, she presented to me for follow-up. I have been following her for portal vein thrombosis and I believe it is due to surgery. She is getting better and she is tolerating regular food now. She does not have any more abdominal pain. Since repeat CT showed complete resolution of portal vein thrombosis, I recommend to stop xarelto today. Reviewed blood test on 12/12/22. She was noted to have low normal range B12 level on 4/19/22 and she had gastric sleeve surgery before, I started vitamin B12 1000 mcg SL daily since 4/2022. We also started multivitamins gummies daily since 4/2022. Her B12 is getting better. Her iron is normal. No need for iron infusion. I will recheck her blood test in 6 months. Otitis externa - will start augmentin BID for 10 days. I asked her to contact us if her symptom doesn't get better with antibiotics. Health maintenance - I recommend her to have age-appropriate cancer screening, exercise, low-fat and low-sodium diet. She does not have any other significant symptoms at today visit. Past Medical History:     Significant for  1. History of kidney stones    Past Surgery History:    Significant for  1. Appendicectomy in 2015  2. Cholecystectomy in 1987  3. Cystoscopy and stone manipulation in 2007  4. Dilatation and curettage of the uterus in 1986  5. Hysterectomy 2007  6. Sleeve gastrectomy in August 4, 2021    Social History:   She denies smoking or illicit drug abuse. She socially drinks alcohol. Family History:    Significant for bronchial asthma and congestive heart failure in her mother and stroke in her father. Allergies:  Levofloxacin    Review of Systems: \"Per interval history; otherwise 10 point ROS is negative. \"  Her energy level is good and her sleep is good. She denies fever, chills, night sweats, cough, shortness of breath, chest pain, hemoptysis or palpitations. Her bowel and bladder functions are normal. She doesn't have nausea, vomiting, abdominal pain, diarrhea, constipation, dysuria, loss of appetite or weight loss. She denies neuropathy and she doesn't have bleeding issues. She doesn't have any pain in her body. No anxiety or depression. The rest of the systems are unremarkable. Vital Signs: BP (!) 110/59 (Site: Right Upper Arm, Position: Sitting, Cuff Size: Large Adult)   Pulse 61   Temp 97.5 °F (36.4 °C) (Infrared)   Resp 18   Ht 5' 1\" (1.549 m)   Wt 169 lb (76.7 kg)   SpO2 99%   BMI 31.93 kg/m²      Physical Exam:  CONSTITUTIONAL: awake, alert, cooperative, no apparent distress   EYES: pupils equal, round and reactive to light, sclera clear, normal conjunctiva  ENT: Normocephalic, without obvious abnormality, atraumatic  NECK: supple, symmetrical, no jugular venous distension, no carotid bruits   HEMATOLOGIC/LYMPHATIC: no cervical, supraclavicular or axillary lymphadenopathy   LUNGS: VBS, no wheezes, no increased work of breathing, no rhonchi, clear to auscultation, no crackles,      CARDIOVASCULAR: regular rate and rhythm, normal S1 and S2, no murmur noted  ABDOMEN: normal bowel sounds x 4, soft, non-distended, non-tender, no masses palpated, no hepatosplenomegaly   MUSCULOSKELETAL: full range of motion noted, tone is normal  NEUROLOGIC: awake, alert, oriented to name, place and time. Motor skills grossly intact. SKIN: appears intact, normal skin color, normal texture, normal turgor, no jaundice.    EXTREMITIES: no cyanosis, no leg swelling, no clubbing, no LE edema,        Labs:  Hematology:  Lab Results   Component Value Date    WBC 3.7 (L) 12/12/2022    RBC 4.53 12/12/2022    HGB 13.3 12/12/2022    HCT 40.8 12/12/2022    MCV 90.1 12/12/2022    MCH 29.4 12/12/2022    MCHC 32.6 12/12/2022    RDW 13.6 12/12/2022     12/12/2022    MPV 10.3 12/12/2022    SEGSPCT 56.5 12/12/2022    EOSRELPCT 1.4 12/12/2022    BASOPCT 0.3 12/12/2022    LYMPHOPCT 35.0 12/12/2022    MONOPCT 6.8 (H) 12/12/2022    SEGSABS 2.1 12/12/2022    EOSABS 0.1 12/12/2022    BASOSABS 0.0 12/12/2022    LYMPHSABS 1.3 12/12/2022    MONOSABS 0.3 12/12/2022    DIFFTYPE AUTOMATED DIFFERENTIAL 12/12/2022     No results found for: ESR  Chemistry:  Lab Results   Component Value Date     12/12/2022    K 4.2 12/12/2022     12/12/2022    CO2 26 12/12/2022    BUN 13 12/12/2022    CREATININE 0.7 12/12/2022    GLUCOSE 119 (H) 12/12/2022    CALCIUM 9.4 12/12/2022    PROT 6.3 (L) 12/12/2022    LABALBU 4.2 12/12/2022    BILITOT 1.2 (H) 12/12/2022    ALKPHOS 58 12/12/2022    AST 14 (L) 12/12/2022    ALT 10 12/12/2022    LABGLOM >60 12/12/2022    GFRAA >60 04/19/2022    PHOS 2.8 09/27/2021    MG 1.9 10/13/2021    POCGLU 141 (H) 08/27/2021     Lab Results   Component Value Date    HOMOCYSTEINE 7.6 08/26/2021     No components found for: LD  No results found for: TSHHS, T4FREE, FT3  Immunology:  Lab Results   Component Value Date    PROT 6.3 (L) 12/12/2022     No results found for: Chinmay Zamora, KLFLCR  No results found for: B2M  Coagulation Panel:  Lab Results   Component Value Date    PROTIME 13.3 08/28/2021    INR 1.03 08/28/2021    APTT 33.0 08/28/2021    DDIMER 491 (H) 04/19/2022     Anemia Panel:  Lab Results   Component Value Date    MBLFLSJK60 2936 (H) 12/12/2022    FOLATE 4.5 12/12/2022     Tumor Markers:  No results found for: , CEA, , LABCA2, PSA     Observations:  PHQ-9 Total Score: 0 (12/13/2022 11:48 AM)       Assessment   Portal vein thrombosis    Plan:                                                                                                    Hypercoagulable work ups done on August 26, 2021 were within normal range. She was released from the hospital with Lovenox and with appointment to see me as an outpatient. Later changed to Jason Boogie. We stopped xarelto on 4/26/22. CT abdomen/pelvis done on 4/20/22 showed resolution of previously seen left portal venous thrombosis now with normal enhancement of the portal vein. No evidence of portal venous thrombosis on today's examination. Mild hepatic steatosis and status postcholecystectomy. On December 13, 2022, she presented to me for follow-up. I have been following her for portal vein thrombosis and I believe it is due to surgery. She is getting better and she is tolerating regular food now. She does not have any more abdominal pain. Since repeat CT showed complete resolution of portal vein thrombosis, I recommend to stop xarelto today. Reviewed blood test on 12/12/22. She was noted to have low normal range B12 level on 4/19/22 and she had gastric sleeve surgery before, I started vitamin B12 1000 mcg SL daily since 4/2022. We also started multivitamins gummies daily since 4/2022. Her B12 is getting better. Her iron is normal. No need for iron infusion. I will recheck her blood test in 6 months. Otitis externa - will start augmentin BID for 10 days. I asked her to contact us if her symptom doesn't get better with antibiotics. Health maintenance - I recommend her to have age-appropriate cancer screening, exercise, low-fat and low-sodium diet. I answered all her questions and concerns for today. Recent imaging and labs were reviewed and discussed with the patient.

## 2022-12-13 NOTE — PROGRESS NOTES
MA Rooming Questions  Patient: Mario Mustafa  MRN: [id number]    Date: 12/13/2022        1. Do you have any new issues?   no         2. Do you need any refills on medications?    no    3. Have you had any imaging done since your last visit?   no    4. Have you been hospitalized or seen in the emergency room since your last visit here?   no    5. Did the patient have a depression screening completed today?  Yes    No data recorded     PHQ-9 Given to (if applicable):               PHQ-9 Score (if applicable):                     [] Positive     [x]  Negative              Does question #9 need addressed (if applicable)                     [] Yes    []  No               Nehemias Diaz MA

## 2023-03-03 ENCOUNTER — TELEPHONE (OUTPATIENT)
Dept: SURGERY | Age: 53
End: 2023-03-03

## 2023-08-31 NOTE — ANESTHESIA POSTPROCEDURE EVALUATION
Department of Anesthesiology  Postprocedure Note    Patient: Baltazar Jones  MRN: [id number]  YOB: 1970  Date of evaluation: 5/28/2021  Time:  9:04 AM     Procedure Summary     Date: 05/28/21 Room / Location: [address] [place] [address]    Anesthesia Start: Tanjaneema Hart Anesthesia Stop: 1498    Procedure: EGD BIOPSY (N/A ) Diagnosis: (MORBID OBESITY)    Surgeons: Fernando Rogers MD Responsible Provider: Reynaldo John MD    Anesthesia Type: MAC ASA Status: 2          Anesthesia Type: MAC    Alfonso Phase I:      Alfonso Phase II: Alfonso Score: 10    Last vitals: Reviewed and per EMR flowsheets.        Anesthesia Post Evaluation    Patient location during evaluation: bedside  Patient participation: complete - patient participated  Level of consciousness: awake and alert  Pain score: 0  Airway patency: patent  Nausea & Vomiting: no nausea and no vomiting  Complications: no  Cardiovascular status: hemodynamically stable  Respiratory status: room air and spontaneous ventilation  Hydration status: stable
High

## 2023-12-04 RX ORDER — GREEN TEA/HOODIA GORDONII 315-12.5MG
CAPSULE ORAL
Qty: 30 TABLET | Refills: 0 | OUTPATIENT
Start: 2023-12-04

## 2024-03-24 ENCOUNTER — HOSPITAL ENCOUNTER (EMERGENCY)
Age: 54
Discharge: HOME | End: 2024-03-24
Attending: EMERGENCY MEDICINE
Payer: COMMERCIAL

## 2024-03-24 ENCOUNTER — APPOINTMENT (OUTPATIENT)
Dept: GENERAL RADIOLOGY | Age: 54
End: 2024-03-24
Attending: EMERGENCY MEDICINE
Payer: COMMERCIAL

## 2024-03-24 ENCOUNTER — APPOINTMENT (OUTPATIENT)
Dept: CT IMAGING | Age: 54
End: 2024-03-24
Payer: COMMERCIAL

## 2024-03-24 VITALS
RESPIRATION RATE: 18 BRPM | HEART RATE: 89 BPM | OXYGEN SATURATION: 96 % | DIASTOLIC BLOOD PRESSURE: 63 MMHG | TEMPERATURE: 98.6 F | SYSTOLIC BLOOD PRESSURE: 111 MMHG

## 2024-03-24 DIAGNOSIS — E87.6 HYPOKALEMIA: ICD-10-CM

## 2024-03-24 DIAGNOSIS — R51.9 NONINTRACTABLE HEADACHE, UNSPECIFIED CHRONICITY PATTERN, UNSPECIFIED HEADACHE TYPE: Primary | ICD-10-CM

## 2024-03-24 DIAGNOSIS — R07.9 CHEST PAIN, UNSPECIFIED TYPE: ICD-10-CM

## 2024-03-24 LAB
ALBUMIN SERPL-MCNC: 3.4 GM/DL (ref 3.4–5)
ALP BLD-CCNC: 58 IU/L (ref 40–129)
ALT SERPL-CCNC: 20 U/L (ref 10–40)
ANION GAP SERPL CALCULATED.3IONS-SCNC: 7 MMOL/L (ref 7–16)
AST SERPL-CCNC: 27 IU/L (ref 15–37)
BASOPHILS ABSOLUTE: 0 K/CU MM
BASOPHILS RELATIVE PERCENT: 0.3 % (ref 0–1)
BILIRUB SERPL-MCNC: 0.8 MG/DL (ref 0–1)
BUN SERPL-MCNC: 11 MG/DL (ref 6–23)
CALCIUM SERPL-MCNC: 7.2 MG/DL (ref 8.3–10.6)
CHLORIDE BLD-SCNC: 110 MMOL/L (ref 99–110)
CO2: 22 MMOL/L (ref 21–32)
CREAT SERPL-MCNC: 0.6 MG/DL (ref 0.6–1.1)
D DIMER: 0.68 UG/ML (FEU)
DIFFERENTIAL TYPE: ABNORMAL
EOSINOPHILS ABSOLUTE: 0 K/CU MM
EOSINOPHILS RELATIVE PERCENT: 0.3 % (ref 0–3)
GFR SERPL CREATININE-BSD FRML MDRD: >60 ML/MIN/1.73M2
GLUCOSE SERPL-MCNC: 85 MG/DL (ref 70–99)
HCT VFR BLD CALC: 42.4 % (ref 37–47)
HEMOGLOBIN: 13.7 GM/DL (ref 12.5–16)
IMMATURE NEUTROPHIL %: 0.3 % (ref 0–0.43)
INFLUENZA A ANTIGEN: NOT DETECTED
INFLUENZA B ANTIGEN: NOT DETECTED
LYMPHOCYTES ABSOLUTE: 0.5 K/CU MM
LYMPHOCYTES RELATIVE PERCENT: 15.3 % (ref 24–44)
MCH RBC QN AUTO: 29 PG (ref 27–31)
MCHC RBC AUTO-ENTMCNC: 32.3 % (ref 32–36)
MCV RBC AUTO: 89.6 FL (ref 78–100)
MONOCYTES ABSOLUTE: 0.4 K/CU MM
MONOCYTES RELATIVE PERCENT: 12.1 % (ref 0–4)
NUCLEATED RBC %: 0 %
PDW BLD-RTO: 12.5 % (ref 11.7–14.9)
PLATELET # BLD: 151 K/CU MM (ref 140–440)
PMV BLD AUTO: 10.5 FL (ref 7.5–11.1)
POTASSIUM SERPL-SCNC: 3.1 MMOL/L (ref 3.5–5.1)
RBC # BLD: 4.73 M/CU MM (ref 4.2–5.4)
SARS-COV-2 RDRP RESP QL NAA+PROBE: NOT DETECTED
SEGMENTED NEUTROPHILS ABSOLUTE COUNT: 2.5 K/CU MM
SEGMENTED NEUTROPHILS RELATIVE PERCENT: 71.7 % (ref 36–66)
SODIUM BLD-SCNC: 139 MMOL/L (ref 135–145)
SOURCE: NORMAL
TOTAL IMMATURE NEUTOROPHIL: 0.01 K/CU MM
TOTAL NUCLEATED RBC: 0 K/CU MM
TOTAL PROTEIN: 5.5 GM/DL (ref 6.4–8.2)
TROPONIN, HIGH SENSITIVITY: <6 NG/L (ref 0–14)
TROPONIN, HIGH SENSITIVITY: <6 NG/L (ref 0–14)
WBC # BLD: 3.5 K/CU MM (ref 4–10.5)

## 2024-03-24 PROCEDURE — 87502 INFLUENZA DNA AMP PROBE: CPT

## 2024-03-24 PROCEDURE — 85025 COMPLETE CBC W/AUTO DIFF WBC: CPT

## 2024-03-24 PROCEDURE — 99285 EMERGENCY DEPT VISIT HI MDM: CPT

## 2024-03-24 PROCEDURE — 6370000000 HC RX 637 (ALT 250 FOR IP): Performed by: NURSE PRACTITIONER

## 2024-03-24 PROCEDURE — 6360000004 HC RX CONTRAST MEDICATION: Performed by: EMERGENCY MEDICINE

## 2024-03-24 PROCEDURE — 71275 CT ANGIOGRAPHY CHEST: CPT

## 2024-03-24 PROCEDURE — 71045 X-RAY EXAM CHEST 1 VIEW: CPT

## 2024-03-24 PROCEDURE — 96375 TX/PRO/DX INJ NEW DRUG ADDON: CPT

## 2024-03-24 PROCEDURE — 6360000002 HC RX W HCPCS: Performed by: NURSE PRACTITIONER

## 2024-03-24 PROCEDURE — 87635 SARS-COV-2 COVID-19 AMP PRB: CPT

## 2024-03-24 PROCEDURE — 84484 ASSAY OF TROPONIN QUANT: CPT

## 2024-03-24 PROCEDURE — 93005 ELECTROCARDIOGRAM TRACING: CPT | Performed by: EMERGENCY MEDICINE

## 2024-03-24 PROCEDURE — 2580000003 HC RX 258: Performed by: NURSE PRACTITIONER

## 2024-03-24 PROCEDURE — 96374 THER/PROPH/DIAG INJ IV PUSH: CPT

## 2024-03-24 PROCEDURE — 85379 FIBRIN DEGRADATION QUANT: CPT

## 2024-03-24 PROCEDURE — 80053 COMPREHEN METABOLIC PANEL: CPT

## 2024-03-24 RX ORDER — METOCLOPRAMIDE HYDROCHLORIDE 5 MG/ML
10 INJECTION INTRAMUSCULAR; INTRAVENOUS ONCE
Status: COMPLETED | OUTPATIENT
Start: 2024-03-24 | End: 2024-03-24

## 2024-03-24 RX ORDER — DIPHENHYDRAMINE HYDROCHLORIDE 50 MG/ML
50 INJECTION INTRAMUSCULAR; INTRAVENOUS ONCE
Status: COMPLETED | OUTPATIENT
Start: 2024-03-24 | End: 2024-03-24

## 2024-03-24 RX ORDER — KETOROLAC TROMETHAMINE 15 MG/ML
15 INJECTION, SOLUTION INTRAMUSCULAR; INTRAVENOUS ONCE
Status: COMPLETED | OUTPATIENT
Start: 2024-03-24 | End: 2024-03-24

## 2024-03-24 RX ORDER — 0.9 % SODIUM CHLORIDE 0.9 %
1000 INTRAVENOUS SOLUTION INTRAVENOUS ONCE
Status: COMPLETED | OUTPATIENT
Start: 2024-03-24 | End: 2024-03-24

## 2024-03-24 RX ADMIN — DIPHENHYDRAMINE HYDROCHLORIDE 50 MG: 50 INJECTION, SOLUTION INTRAMUSCULAR; INTRAVENOUS at 17:50

## 2024-03-24 RX ADMIN — IOPAMIDOL 80 ML: 755 INJECTION, SOLUTION INTRAVENOUS at 19:17

## 2024-03-24 RX ADMIN — SODIUM CHLORIDE 1000 ML: 9 INJECTION, SOLUTION INTRAVENOUS at 17:37

## 2024-03-24 RX ADMIN — KETOROLAC TROMETHAMINE 15 MG: 15 INJECTION, SOLUTION INTRAMUSCULAR; INTRAVENOUS at 17:50

## 2024-03-24 RX ADMIN — POTASSIUM BICARBONATE 40 MEQ: 782 TABLET, EFFERVESCENT ORAL at 18:51

## 2024-03-24 RX ADMIN — METOCLOPRAMIDE 10 MG: 5 INJECTION, SOLUTION INTRAMUSCULAR; INTRAVENOUS at 17:50

## 2024-03-24 ASSESSMENT — PAIN DESCRIPTION - LOCATION: LOCATION: HEAD;ELBOW

## 2024-03-24 ASSESSMENT — PAIN SCALES - GENERAL: PAINLEVEL_OUTOF10: 7

## 2024-03-25 NOTE — DISCHARGE INSTRUCTIONS
Please follow-up with your primary care provider in the next 2 to 3 days.  Return to the emergency department for any worsening signs or symptoms or other acute concerns.

## 2024-03-26 LAB
EKG ATRIAL RATE: 104 BPM
EKG DIAGNOSIS: NORMAL
EKG P AXIS: 30 DEGREES
EKG P-R INTERVAL: 172 MS
EKG Q-T INTERVAL: 330 MS
EKG QRS DURATION: 68 MS
EKG QTC CALCULATION (BAZETT): 433 MS
EKG R AXIS: -9 DEGREES
EKG T AXIS: 55 DEGREES
EKG VENTRICULAR RATE: 104 BPM

## 2024-03-26 PROCEDURE — 93010 ELECTROCARDIOGRAM REPORT: CPT | Performed by: INTERNAL MEDICINE

## (undated) DEVICE — Z DISCONTINUED (USE MFG CAT MVABO)  TUBING GAS SAMPLING STD 6.5 FT FEMALE CONN SMRT CAPNOLINE

## (undated) DEVICE — SOLUTION IRRIG 1000ML STRL H2O USP PLAS POUR BTL

## (undated) DEVICE — EXCEL 10FT (3.05 M) INSUFFLATION TUBING SET WITH 0.1 MICRON FILTER: Brand: EXCEL

## (undated) DEVICE — SUTURE STRATAFIX SPRL PDS + VLT 3-0 15CM DISPOSABLE/SNGLE SXPP1B420

## (undated) DEVICE — STAPLER 60: Brand: SUREFORM

## (undated) DEVICE — COLUMN DRAPE

## (undated) DEVICE — TOWEL,OR,DSP,ST,BLUE,STD,6/PK,12PK/CS: Brand: MEDLINE

## (undated) DEVICE — STAPLER 60 RELOAD BLUE: Brand: SUREFORM

## (undated) DEVICE — MARKER SURG SKIN UTIL REGULAR/FINE 2 TIP W/ RUL AND 9 LBL

## (undated) DEVICE — VESSEL SEALER EXTEND: Brand: ENDOWRIST

## (undated) DEVICE — CANNULA SEAL

## (undated) DEVICE — ADHESIVE SKIN CLSR 0.7ML TOP DERMBND ADV

## (undated) DEVICE — SOLUTION IV 1000ML 0.9% SOD CHL FOR IRRIG PLAS CONT

## (undated) DEVICE — DUAL LUMEN STOMACH TUBE: Brand: SALEM SUMP

## (undated) DEVICE — SUTURE SZ 0 27IN 5/8 CIR UR-6  TAPER PT VIOLET ABSRB VICRYL J603H

## (undated) DEVICE — CADIERE FORCEPS: Brand: ENDOWRIST

## (undated) DEVICE — REDUCER: Brand: ENDOWRIST

## (undated) DEVICE — APPLICATOR MEDICATED 26 CC SOLUTION HI LT ORNG CHLORAPREP

## (undated) DEVICE — SUTURE COAT VCRL SZ 4-0 L18IN ABSRB UD L19MM PS-2 1/2 CIR J496G

## (undated) DEVICE — ARM DRAPE

## (undated) DEVICE — ELECTRODE ES AD CRDLSS PT RET REM POLYHESIVE

## (undated) DEVICE — LAPAROSCOPIC TROCAR SLEEVE/SINGLE USE: Brand: KII® OPTICAL ACCESS SYSTEM

## (undated) DEVICE — SUTURE VCRL SZ 3-0 L36IN ABSRB VLT SH L26MM 1/2 CIR DBL J527H

## (undated) DEVICE — SYRINGE MED 20ML STD CLR PLAS LUERLOCK TIP N CTRL DISP

## (undated) DEVICE — Device

## (undated) DEVICE — TIP COVER ACCESSORY

## (undated) DEVICE — SEAL

## (undated) DEVICE — SUTURE V-LOC 180 SZ 3-0 L12IN ABSRB GRN V-20 L26MM 1/2 CIR VLOCL0614

## (undated) DEVICE — LARGE SUTURE CUT NEEDLE DRIVER: Brand: ENDOWRIST

## (undated) DEVICE — STAPLER 60 RELOAD GREEN: Brand: SUREFORM

## (undated) DEVICE — FORCEPS BX L240CM JAW DIA2.8MM L CAP W/ NDL MIC MESH TOOTH

## (undated) DEVICE — SUREFORM 45: Brand: SUREFORM